# Patient Record
Sex: FEMALE | Race: WHITE | NOT HISPANIC OR LATINO | Employment: OTHER | URBAN - METROPOLITAN AREA
[De-identification: names, ages, dates, MRNs, and addresses within clinical notes are randomized per-mention and may not be internally consistent; named-entity substitution may affect disease eponyms.]

---

## 2023-04-11 ENCOUNTER — DOCUMENTATION (OUTPATIENT)
Dept: PRIMARY CARE | Facility: CLINIC | Age: 78
End: 2023-04-11
Payer: MEDICARE

## 2023-04-11 DIAGNOSIS — L29.9 ITCH OF SKIN: ICD-10-CM

## 2023-04-11 RX ORDER — TRIAMCINOLONE ACETONIDE 5 MG/G
CREAM TOPICAL 3 TIMES DAILY
Qty: 454 G | Refills: 0 | Status: SHIPPED | OUTPATIENT
Start: 2023-04-11 | End: 2023-10-10 | Stop reason: ALTCHOICE

## 2023-04-11 RX ORDER — TRIAMCINOLONE ACETONIDE 5 MG/G
CREAM TOPICAL
COMMUNITY
Start: 2023-01-10 | End: 2023-04-11 | Stop reason: SDUPTHER

## 2023-04-17 ENCOUNTER — APPOINTMENT (OUTPATIENT)
Dept: LAB | Facility: LAB | Age: 78
End: 2023-04-17
Payer: MEDICARE

## 2023-04-17 LAB
ALANINE AMINOTRANSFERASE (SGPT) (U/L) IN SER/PLAS: 18 U/L (ref 7–45)
ALBUMIN (G/DL) IN SER/PLAS: 4.2 G/DL (ref 3.4–5)
ALKALINE PHOSPHATASE (U/L) IN SER/PLAS: 48 U/L (ref 33–136)
ANION GAP IN SER/PLAS: 14 MMOL/L (ref 10–20)
ASPARTATE AMINOTRANSFERASE (SGOT) (U/L) IN SER/PLAS: 24 U/L (ref 9–39)
BILIRUBIN TOTAL (MG/DL) IN SER/PLAS: 0.5 MG/DL (ref 0–1.2)
CALCIUM (MG/DL) IN SER/PLAS: 9.3 MG/DL (ref 8.6–10.3)
CARBON DIOXIDE, TOTAL (MMOL/L) IN SER/PLAS: 26 MMOL/L (ref 21–32)
CHLORIDE (MMOL/L) IN SER/PLAS: 104 MMOL/L (ref 98–107)
CHOLESTEROL (MG/DL) IN SER/PLAS: 162 MG/DL (ref 0–199)
CHOLESTEROL IN HDL (MG/DL) IN SER/PLAS: 78.5 MG/DL
CHOLESTEROL/HDL RATIO: 2.1
CREATININE (MG/DL) IN SER/PLAS: 0.81 MG/DL (ref 0.5–1.05)
ERYTHROCYTE DISTRIBUTION WIDTH (RATIO) BY AUTOMATED COUNT: 14.1 % (ref 11.5–14.5)
ERYTHROCYTE MEAN CORPUSCULAR HEMOGLOBIN CONCENTRATION (G/DL) BY AUTOMATED: 32.2 G/DL (ref 32–36)
ERYTHROCYTE MEAN CORPUSCULAR VOLUME (FL) BY AUTOMATED COUNT: 95 FL (ref 80–100)
ERYTHROCYTES (10*6/UL) IN BLOOD BY AUTOMATED COUNT: 4.15 X10E12/L (ref 4–5.2)
GFR FEMALE: 74 ML/MIN/1.73M2
GLUCOSE (MG/DL) IN SER/PLAS: 88 MG/DL (ref 74–99)
HEMATOCRIT (%) IN BLOOD BY AUTOMATED COUNT: 39.4 % (ref 36–46)
HEMOGLOBIN (G/DL) IN BLOOD: 12.7 G/DL (ref 12–16)
LDL: 72 MG/DL (ref 0–99)
LEUKOCYTES (10*3/UL) IN BLOOD BY AUTOMATED COUNT: 5.6 X10E9/L (ref 4.4–11.3)
PLATELETS (10*3/UL) IN BLOOD AUTOMATED COUNT: 176 X10E9/L (ref 150–450)
POTASSIUM (MMOL/L) IN SER/PLAS: 4.2 MMOL/L (ref 3.5–5.3)
PROTEIN TOTAL: 6.6 G/DL (ref 6.4–8.2)
SODIUM (MMOL/L) IN SER/PLAS: 140 MMOL/L (ref 136–145)
THYROTROPIN (MIU/L) IN SER/PLAS BY DETECTION LIMIT <= 0.05 MIU/L: 0.42 MIU/L (ref 0.44–3.98)
TRIGLYCERIDE (MG/DL) IN SER/PLAS: 56 MG/DL (ref 0–149)
UREA NITROGEN (MG/DL) IN SER/PLAS: 27 MG/DL (ref 6–23)
VLDL: 11 MG/DL (ref 0–40)

## 2023-05-01 ENCOUNTER — OFFICE VISIT (OUTPATIENT)
Dept: PRIMARY CARE | Facility: CLINIC | Age: 78
End: 2023-05-01
Payer: MEDICARE

## 2023-05-01 VITALS
SYSTOLIC BLOOD PRESSURE: 130 MMHG | WEIGHT: 127 LBS | BODY MASS INDEX: 21.68 KG/M2 | DIASTOLIC BLOOD PRESSURE: 64 MMHG | HEIGHT: 64 IN

## 2023-05-01 DIAGNOSIS — E78.2 MIXED HYPERLIPIDEMIA: ICD-10-CM

## 2023-05-01 DIAGNOSIS — E89.0 HYPOTHYROIDISM, POSTABLATIVE: Primary | ICD-10-CM

## 2023-05-01 DIAGNOSIS — Z12.31 SCREENING MAMMOGRAM, ENCOUNTER FOR: ICD-10-CM

## 2023-05-01 DIAGNOSIS — I48.0 PAROXYSMAL ATRIAL FIBRILLATION (MULTI): ICD-10-CM

## 2023-05-01 PROBLEM — S69.92XA INJURY OF LEFT WRIST: Status: ACTIVE | Noted: 2023-05-01

## 2023-05-01 PROBLEM — Z97.3 WEARS GLASSES: Status: ACTIVE | Noted: 2023-05-01

## 2023-05-01 PROBLEM — D49.2 BONE TUMOR: Status: ACTIVE | Noted: 2023-05-01

## 2023-05-01 PROBLEM — R00.2 PALPITATIONS: Status: ACTIVE | Noted: 2023-05-01

## 2023-05-01 PROBLEM — K21.9 GERD WITHOUT ESOPHAGITIS: Status: ACTIVE | Noted: 2023-05-01

## 2023-05-01 PROBLEM — R19.4 CHANGE IN BOWEL HABIT: Status: ACTIVE | Noted: 2023-05-01

## 2023-05-01 PROBLEM — H18.529 CORNEAL EPITHELIAL AND BASEMENT MEMBRANE DYSTROPHY: Status: ACTIVE | Noted: 2023-05-01

## 2023-05-01 PROBLEM — M25.562 BILATERAL KNEE PAIN: Status: ACTIVE | Noted: 2023-05-01

## 2023-05-01 PROBLEM — R53.83 FATIGUE: Status: ACTIVE | Noted: 2023-05-01

## 2023-05-01 PROBLEM — M89.9 DISORDER OF BONE AND ARTICULAR CARTILAGE: Status: ACTIVE | Noted: 2023-05-01

## 2023-05-01 PROBLEM — G95.0 SYRINX OF SPINAL CORD (MULTI): Status: ACTIVE | Noted: 2023-05-01

## 2023-05-01 PROBLEM — R92.8 ABNORMAL MAMMOGRAM OF RIGHT BREAST: Status: ACTIVE | Noted: 2023-05-01

## 2023-05-01 PROBLEM — D12.6 TUBULAR ADENOMA OF COLON: Status: ACTIVE | Noted: 2023-05-01
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
## 2023-05-01 PROBLEM — H91.90 HEARING LOSS: Status: ACTIVE | Noted: 2023-05-01

## 2023-05-01 PROBLEM — M53.3 SACROILIAC JOINT PAIN: Status: ACTIVE | Noted: 2023-05-01

## 2023-05-01 PROBLEM — H53.8 BLURRED VISION, BILATERAL: Status: ACTIVE | Noted: 2023-05-01

## 2023-05-01 PROBLEM — S66.912A WRIST STRAIN, LEFT, INITIAL ENCOUNTER: Status: ACTIVE | Noted: 2023-05-01

## 2023-05-01 PROBLEM — M16.12 PRIMARY OSTEOARTHRITIS OF LEFT HIP: Status: ACTIVE | Noted: 2023-05-01

## 2023-05-01 PROBLEM — R79.89 ELEVATED SERUM CREATININE: Status: ACTIVE | Noted: 2023-05-01

## 2023-05-01 PROBLEM — I48.91 ATRIAL FIBRILLATION (MULTI): Status: ACTIVE | Noted: 2023-05-01

## 2023-05-01 PROBLEM — M25.561 BILATERAL KNEE PAIN: Status: ACTIVE | Noted: 2023-05-01

## 2023-05-01 PROBLEM — E55.9 VITAMIN D INSUFFICIENCY: Status: ACTIVE | Noted: 2023-05-01

## 2023-05-01 PROBLEM — E03.9 HYPOTHYROIDISM: Status: ACTIVE | Noted: 2023-05-01

## 2023-05-01 PROBLEM — R42 DIZZINESS: Status: ACTIVE | Noted: 2023-05-01

## 2023-05-01 PROBLEM — G93.0 BRAIN CYST: Status: ACTIVE | Noted: 2023-05-01

## 2023-05-01 PROBLEM — M94.9 DISORDER OF BONE AND ARTICULAR CARTILAGE: Status: ACTIVE | Noted: 2023-05-01

## 2023-05-01 PROBLEM — R06.02 SOB (SHORTNESS OF BREATH): Status: ACTIVE | Noted: 2023-05-01

## 2023-05-01 PROBLEM — M17.11 PRIMARY OSTEOARTHRITIS OF RIGHT KNEE: Status: ACTIVE | Noted: 2023-05-01

## 2023-05-01 PROBLEM — I47.10 PSVT (PAROXYSMAL SUPRAVENTRICULAR TACHYCARDIA) (CMS-HCC): Status: ACTIVE | Noted: 2023-05-01

## 2023-05-01 PROBLEM — M79.2 NEURALGIA: Status: ACTIVE | Noted: 2023-05-01

## 2023-05-01 PROBLEM — J47.9 BRONCHIECTASIS (MULTI): Status: ACTIVE | Noted: 2023-05-01

## 2023-05-01 PROBLEM — K62.5 RECTAL BLEEDING: Status: ACTIVE | Noted: 2023-05-01

## 2023-05-01 PROBLEM — N39.0 ACUTE LOWER UTI: Status: ACTIVE | Noted: 2023-05-01

## 2023-05-01 PROBLEM — R00.1 BRADYCARDIA: Status: ACTIVE | Noted: 2023-05-01

## 2023-05-01 PROBLEM — R10.9 ABDOMINAL PAIN: Status: ACTIVE | Noted: 2023-05-01

## 2023-05-01 PROBLEM — R53.81 MALAISE AND FATIGUE: Status: ACTIVE | Noted: 2023-05-01

## 2023-05-01 PROBLEM — M25.561 RIGHT KNEE PAIN: Status: ACTIVE | Noted: 2023-05-01

## 2023-05-01 PROBLEM — R51.9 RIGHT SIDED TEMPORAL HEADACHE: Status: ACTIVE | Noted: 2023-05-01

## 2023-05-01 PROBLEM — H04.123 DRY EYES: Status: ACTIVE | Noted: 2023-05-01

## 2023-05-01 PROBLEM — J06.9 VIRAL UPPER RESPIRATORY TRACT INFECTION WITH COUGH: Status: ACTIVE | Noted: 2023-05-01

## 2023-05-01 PROBLEM — M81.0 OSTEOPOROSIS: Status: ACTIVE | Noted: 2023-05-01

## 2023-05-01 PROBLEM — T14.8XXA SPLINTER: Status: ACTIVE | Noted: 2023-05-01

## 2023-05-01 PROBLEM — H25.13 NUCLEAR SCLEROSIS OF BOTH EYES: Status: ACTIVE | Noted: 2023-05-01

## 2023-05-01 PROBLEM — R10.32 ABDOMINAL DISCOMFORT, BILATERAL LOWER QUADRANT: Status: ACTIVE | Noted: 2023-05-01

## 2023-05-01 PROBLEM — R19.5 LOOSE STOOLS: Status: ACTIVE | Noted: 2023-05-01

## 2023-05-01 PROBLEM — M25.551 RIGHT HIP PAIN: Status: ACTIVE | Noted: 2023-05-01

## 2023-05-01 PROBLEM — T14.8XXA FOREIGN BODY IN SKIN: Status: ACTIVE | Noted: 2023-05-01

## 2023-05-01 PROBLEM — R79.82 ELEVATED HIGH SENSITIVITY C-REACTIVE PROTEIN: Status: ACTIVE | Noted: 2023-05-01

## 2023-05-01 PROBLEM — K55.9 ISCHEMIC COLITIS (MULTI): Status: ACTIVE | Noted: 2023-05-01

## 2023-05-01 PROBLEM — E05.00 GRAVES DISEASE: Status: ACTIVE | Noted: 2023-05-01

## 2023-05-01 PROBLEM — R03.0 BLOOD PRESSURE ELEVATED WITHOUT HISTORY OF HTN: Status: ACTIVE | Noted: 2023-05-01

## 2023-05-01 PROBLEM — M31.6 TEMPORAL ARTERITIS (MULTI): Status: ACTIVE | Noted: 2023-05-01

## 2023-05-01 PROBLEM — R10.819 LOWER ABDOMINAL TENDERNESS: Status: ACTIVE | Noted: 2023-05-01

## 2023-05-01 PROBLEM — E78.5 HYPERLIPIDEMIA: Status: ACTIVE | Noted: 2023-05-01

## 2023-05-01 PROBLEM — H43.393 VITREOUS FLOATERS OF BOTH EYES: Status: ACTIVE | Noted: 2023-05-01

## 2023-05-01 PROBLEM — R91.1 LUNG NODULE: Status: ACTIVE | Noted: 2023-05-01

## 2023-05-01 PROBLEM — E78.00 PURE HYPERCHOLESTEROLEMIA: Status: ACTIVE | Noted: 2023-05-01

## 2023-05-01 PROBLEM — D64.9 ANEMIA: Status: ACTIVE | Noted: 2023-05-01

## 2023-05-01 PROBLEM — H26.9 EARLY CATARACT: Status: ACTIVE | Noted: 2023-05-01

## 2023-05-01 PROBLEM — R50.9 FEVER: Status: ACTIVE | Noted: 2023-05-01

## 2023-05-01 PROBLEM — H52.03 HYPEROPIA OF BOTH EYES: Status: ACTIVE | Noted: 2023-05-01

## 2023-05-01 PROBLEM — R53.83 MALAISE AND FATIGUE: Status: ACTIVE | Noted: 2023-05-01

## 2023-05-01 PROBLEM — J01.00 ACUTE MAXILLARY SINUSITIS: Status: ACTIVE | Noted: 2023-05-01

## 2023-05-01 PROBLEM — R93.1 ELEVATED CORONARY ARTERY CALCIUM SCORE: Status: ACTIVE | Noted: 2023-05-01

## 2023-05-01 PROBLEM — R10.31 ABDOMINAL DISCOMFORT, BILATERAL LOWER QUADRANT: Status: ACTIVE | Noted: 2023-05-01

## 2023-05-01 PROBLEM — B35.4 RINGWORM OF BODY: Status: ACTIVE | Noted: 2023-05-01

## 2023-05-01 PROCEDURE — 1159F MED LIST DOCD IN RCRD: CPT | Performed by: INTERNAL MEDICINE

## 2023-05-01 PROCEDURE — 99213 OFFICE O/P EST LOW 20 MIN: CPT | Performed by: INTERNAL MEDICINE

## 2023-05-01 RX ORDER — UBIDECARENONE 75 MG
500 CAPSULE ORAL
COMMUNITY
Start: 2012-03-09

## 2023-05-01 RX ORDER — ROSUVASTATIN CALCIUM 20 MG/1
20 TABLET, COATED ORAL DAILY
COMMUNITY
Start: 2023-03-28 | End: 2024-02-01 | Stop reason: SDUPTHER

## 2023-05-01 RX ORDER — NAPROXEN 375 MG/1
1 TABLET ORAL 2 TIMES DAILY
COMMUNITY
Start: 2022-06-02

## 2023-05-01 RX ORDER — MULTIVITAMIN
TABLET ORAL
COMMUNITY
Start: 2007-05-16

## 2023-05-01 RX ORDER — METHYLPREDNISOLONE 4 MG
1000 TABLET, DOSE PACK ORAL
COMMUNITY
Start: 2012-03-09

## 2023-05-01 RX ORDER — CHOLECALCIFEROL (VITAMIN D3) 50 MCG
1 TABLET ORAL DAILY
COMMUNITY
Start: 2017-06-01

## 2023-05-01 RX ORDER — CALCIUM CARBONATE 600 MG
TABLET ORAL
COMMUNITY
Start: 2017-06-01 | End: 2023-10-24 | Stop reason: HOSPADM

## 2023-05-01 RX ORDER — FLECAINIDE ACETATE 100 MG/1
1 TABLET ORAL EVERY 12 HOURS
COMMUNITY
Start: 2015-10-21 | End: 2023-10-03

## 2023-05-01 RX ORDER — LEVOTHYROXINE SODIUM 100 UG/1
1 TABLET ORAL DAILY
COMMUNITY
Start: 2020-01-20 | End: 2023-05-01 | Stop reason: ALTCHOICE

## 2023-05-01 RX ORDER — FLAXSEED OIL 1000 MG
1000 CAPSULE ORAL 2 TIMES DAILY
COMMUNITY
Start: 2012-03-09

## 2023-05-01 RX ORDER — FOLIC ACID 1 MG/1
TABLET ORAL 2 TIMES DAILY
COMMUNITY

## 2023-05-01 RX ORDER — LEVOTHYROXINE SODIUM 88 UG/1
88 TABLET ORAL DAILY
Qty: 90 TABLET | Refills: 1 | Status: SHIPPED | OUTPATIENT
Start: 2023-05-01 | End: 2023-10-10 | Stop reason: SDUPTHER

## 2023-05-01 RX ORDER — PROPRANOLOL/HYDROCHLOROTHIAZID 40 MG-25MG
TABLET ORAL
COMMUNITY

## 2023-05-01 RX ORDER — OMEPRAZOLE 20 MG/1
20 CAPSULE, DELAYED RELEASE ORAL
COMMUNITY
Start: 2014-12-03 | End: 2023-05-01 | Stop reason: SDUPTHER

## 2023-05-01 RX ORDER — OMEPRAZOLE 20 MG/1
20 CAPSULE, DELAYED RELEASE ORAL
Qty: 180 CAPSULE | Refills: 1 | Status: SHIPPED | OUTPATIENT
Start: 2023-05-01 | End: 2023-10-10 | Stop reason: SDUPTHER

## 2023-05-01 RX ORDER — ASPIRIN 81 MG/1
1 TABLET ORAL DAILY
COMMUNITY
Start: 2016-12-16 | End: 2023-10-24 | Stop reason: HOSPADM

## 2023-05-01 NOTE — PROGRESS NOTES
Subjective   Patient ID: Laura Banuelos is a 77 y.o. female who presents for Follow-up (results).    HPI   Patient is here for follow-up on CT of the lungs  She has history of A. fib had ablation done she is off medications  She has history of heavy smoking in the past  She quit taking Sudafed and feeling better     Patient is here for follow-up on CT cardiac scoring  Also due for blood work     c/o feeling sob and dizziness for a while  Last month symptoms are getting worse  Denies any swelling in the legs  Denies any cough  Patient is taking to 2 Sudafed every day for last many months     patient here for f/u  headaches less   did blood work   due for colonoscopy soon for colon polyp   needs med refill  was eating lot of keys and egg  stopoped taking omeprazole   but needed to go on it again         past recap   Patient here for follow-up on abnormal blood work done by the neurologist  She is seeing neurologist for the headache but they are doing much better  She had MRI of the brain which showed cyst has a follow-up MRI scheduled for 6 months  She did colonoscopy shows tubular adenoma  She has follow-up appointment with Dr. Gr because of one large polyp which could not be removed and it was showing tubular adenoma  Follow-up on hypothyroidism high cholesterol and GERD  Doing very well on the current medications  Needs refills  Her blood work is due in January and she will be doing it then but does not want to come back     Patient is here for follow-up from urgent care  Couple of weeks ago patient started having some blurred vision Tuesday she started having headache about the right eye went to the urgent care and they got concerned that patient is having temporal arteritis. Patient was started on high-dose steroids. No blood work done for sed rate  No imaging study done  Patient says headache is less but still persisting describes it as dull headache. Denies any vision problems now had blurred vision couple  "of weeks ago        past recap  To establish PCP  Follow-up for high cholesterol hypothyroidism and A. fib s/p ablation  Due for blood work  Patient also has tubular adenoma and due for screening colonoscopy now  She got hearing aids and doing very well  Occasionally she gets lightheaded when she gets up too fast  Sometimes she does get numbness and tingling in the tips of the fingers and the feet     Past medical history: Graves' disease 40 years ago s/p radiation treatment now hypothyroidism, atrial fibrillation s/p ablation, ischemic colitis, appendectomy, osteoporosis, high cholesterol, tubular adenoma  Family history: Father coronary artery disease mother  of leukemia  Social history: Non-smoker, history of heavy smoking for long time drinks 1 wine every day  Health maintenance: Colonoscopy 2016      Review of Systems    Objective   /64   Ht 1.626 m (5' 4\")   Wt 57.6 kg (127 lb)   BMI 21.80 kg/m²     Physical Exam  Vitals reviewed.   Constitutional:       Appearance: Normal appearance.   HENT:      Head: Normocephalic and atraumatic.      Right Ear: Tympanic membrane, ear canal and external ear normal.      Left Ear: Tympanic membrane, ear canal and external ear normal.      Nose: Nose normal.      Mouth/Throat:      Pharynx: Oropharynx is clear.   Eyes:      Extraocular Movements: Extraocular movements intact.      Conjunctiva/sclera: Conjunctivae normal.      Pupils: Pupils are equal, round, and reactive to light.   Cardiovascular:      Rate and Rhythm: Normal rate and regular rhythm.      Pulses: Normal pulses.      Heart sounds: Normal heart sounds.   Pulmonary:      Effort: Pulmonary effort is normal.      Breath sounds: Normal breath sounds.   Abdominal:      General: Abdomen is flat. Bowel sounds are normal.      Palpations: Abdomen is soft.   Musculoskeletal:      Cervical back: Normal range of motion and neck supple.   Skin:     General: Skin is warm and dry.   Neurological:      General: " No focal deficit present.      Mental Status: She is alert and oriented to person, place, and time.   Psychiatric:         Mood and Affect: Mood normal.         Assessment/Plan   Problem List Items Addressed This Visit          Circulatory    Atrial fibrillation (CMS/HCC)    Relevant Medications    omeprazole (PriLOSEC) 20 mg DR capsule    Other Relevant Orders    CBC    Comprehensive Metabolic Panel    Lipid Panel       Endocrine/Metabolic    Hypothyroidism, postablative - Primary    Relevant Medications    levothyroxine (Synthroid, Levoxyl) 88 mcg tablet    Other Relevant Orders    TSH with reflex to Free T4 if abnormal       Other    Hyperlipidemia    Relevant Medications    omeprazole (PriLOSEC) 20 mg DR capsule    Other Relevant Orders    CBC    Comprehensive Metabolic Panel    Lipid Panel     Other Visit Diagnoses       Screening mammogram, encounter for        Relevant Orders    BI mammo bilateral screening tomosynthesis             5/31  Injections normal sinus rhythm  Chest x-ray done shows no acute finding  Patient examination is normal  She had MRI done recently which was showing no acute findings  Her blood work recently in March was all normal no signs of anemia  We will do CT cardiac scoring to assess cardiac risk. Try Antivert to see if it helps  Also not sure if symptoms could be related to Sudafed  Advised patient to cut downtaking Sudafed   Follow-up after the test     8/1  CT cardiac scoring reviewed  Patient has elevated CT cardiac score but not critical  Discussed lifestyle modification  Lung nodule we will do CT chest without contrast  Patient has history of smoking which is probably the cause for her bronchiectasis  Discussed pulmonary hygiene  Blood work ordered     11/28/22  CT of the chest results reviewed  Patient has long history of smoking  She was 2 packs/day for many years  Which shows severe emphysema and bronchiectasis  Clinically patient has no signs of TB no cough no fever no  history of TB  Encourage patient to do pulmonary exercises  We will check 2D echo for atherosclerosis  Refer to cardiology for cardiac evaluation  Medications refilled  Follow-up in 3 months    4/1/2023  CBC CMP liver enzymes all normal  TSH little suppressed  Will reduce dose of Synthroid 88 mcg  Cholesterol very well controlled on Crestor  Continue omeprazole  Follow-up blood work in 6 months  Mammogram ordered  Patient is going to see the orthopedic for the knee

## 2023-06-27 ENCOUNTER — HOSPITAL ENCOUNTER (OUTPATIENT)
Dept: DATA CONVERSION | Facility: HOSPITAL | Age: 78
End: 2023-06-27
Attending: RADIOLOGY | Admitting: RADIOLOGY
Payer: MEDICARE

## 2023-06-27 DIAGNOSIS — M25.552 PAIN IN LEFT HIP: ICD-10-CM

## 2023-06-27 DIAGNOSIS — M16.12 UNILATERAL PRIMARY OSTEOARTHRITIS, LEFT HIP: ICD-10-CM

## 2023-06-27 LAB
CLARITY FLUID: CLEAR
COLOR OF BODY FLUID: NORMAL
ERYTHROCYTES (/UL) IN BODY FLUID: 1000 /UL
LEUKOCYTES (/UL) IN BODY FLUID: 154 /UL

## 2023-06-28 LAB — JOINT FLUID CRYSTALS: NORMAL

## 2023-07-01 LAB
GRAM STAIN: NORMAL
STERILE FLUID CULTURE/SMEAR: NORMAL

## 2023-08-07 ENCOUNTER — HOSPITAL ENCOUNTER (OUTPATIENT)
Dept: DATA CONVERSION | Facility: HOSPITAL | Age: 78
End: 2023-08-07
Attending: INTERNAL MEDICINE
Payer: MEDICARE

## 2023-08-07 DIAGNOSIS — D12.0 BENIGN NEOPLASM OF CECUM: ICD-10-CM

## 2023-08-07 DIAGNOSIS — D12.2 BENIGN NEOPLASM OF ASCENDING COLON: ICD-10-CM

## 2023-08-07 DIAGNOSIS — Z86.010 PERSONAL HISTORY OF COLONIC POLYPS: ICD-10-CM

## 2023-08-07 DIAGNOSIS — D12.6 BENIGN NEOPLASM OF COLON, UNSPECIFIED: ICD-10-CM

## 2023-08-07 DIAGNOSIS — Z12.11 ENCOUNTER FOR SCREENING FOR MALIGNANT NEOPLASM OF COLON: ICD-10-CM

## 2023-08-07 DIAGNOSIS — Z09 ENCOUNTER FOR FOLLOW-UP EXAMINATION AFTER COMPLETED TREATMENT FOR CONDITIONS OTHER THAN MALIGNANT NEOPLASM: ICD-10-CM

## 2023-08-07 DIAGNOSIS — D12.3 BENIGN NEOPLASM OF TRANSVERSE COLON: ICD-10-CM

## 2023-08-11 LAB
COMPLETE PATHOLOGY REPORT: NORMAL
CONVERTED CLINICAL DIAGNOSIS-HISTORY: NORMAL
CONVERTED FINAL DIAGNOSIS: NORMAL
CONVERTED FINAL REPORT PDF LINK TO COPY AND PASTE: NORMAL
CONVERTED GROSS DESCRIPTION: NORMAL

## 2023-09-11 PROBLEM — I25.10 CORONARY ATHEROSCLEROSIS: Status: ACTIVE | Noted: 2023-09-11

## 2023-09-11 PROBLEM — D12.6 COLON ADENOMAS: Status: ACTIVE | Noted: 2023-09-11

## 2023-09-11 PROBLEM — J44.9 CHRONIC OBSTRUCTIVE PULMONARY DISEASE (MULTI): Status: ACTIVE | Noted: 2023-09-11

## 2023-09-11 PROBLEM — J43.9 EMPHYSEMA LUNG (MULTI): Status: ACTIVE | Noted: 2023-09-11

## 2023-09-11 PROBLEM — L89.90 PRESSURE SORE: Status: ACTIVE | Noted: 2023-09-11

## 2023-09-11 PROBLEM — M85.80 OSTEOPENIA: Status: ACTIVE | Noted: 2023-09-11

## 2023-09-11 PROBLEM — K37 APPENDICITIS: Status: ACTIVE | Noted: 2023-09-11

## 2023-09-11 PROBLEM — H43.399 VITREOUS FLOATERS: Status: ACTIVE | Noted: 2023-09-11

## 2023-09-11 RX ORDER — POLYETHYLENE GLYCOL 3350, SODIUM SULFATE ANHYDROUS, SODIUM BICARBONATE, SODIUM CHLORIDE, POTASSIUM CHLORIDE 236; 22.74; 6.74; 5.86; 2.97 G/4L; G/4L; G/4L; G/4L; G/4L
POWDER, FOR SOLUTION ORAL
COMMUNITY
Start: 2023-04-25 | End: 2023-10-03 | Stop reason: ALTCHOICE

## 2023-09-11 RX ORDER — OMEPRAZOLE 20 MG/1
1 CAPSULE, DELAYED RELEASE ORAL 2 TIMES DAILY
COMMUNITY
Start: 2022-11-28 | End: 2023-10-03 | Stop reason: ALTCHOICE

## 2023-09-11 RX ORDER — PHENYLPROPANOLAMINE/CLEMASTINE 75-1.34MG
TABLET, EXTENDED RELEASE ORAL
COMMUNITY
Start: 2017-12-07 | End: 2023-10-03 | Stop reason: ALTCHOICE

## 2023-09-11 RX ORDER — OXYCODONE AND ACETAMINOPHEN 5; 325 MG/1; MG/1
TABLET ORAL
COMMUNITY
End: 2023-10-03 | Stop reason: ALTCHOICE

## 2023-09-11 RX ORDER — WARFARIN 4 MG/1
4 TABLET ORAL EVERY 24 HOURS
COMMUNITY
End: 2023-10-03 | Stop reason: ALTCHOICE

## 2023-09-11 RX ORDER — LEVOTHYROXINE SODIUM 88 UG/1
1 CAPSULE ORAL DAILY
COMMUNITY
Start: 2020-01-20 | End: 2023-10-03 | Stop reason: ALTCHOICE

## 2023-09-11 RX ORDER — SIMVASTATIN 10 MG/1
10 TABLET, FILM COATED ORAL NIGHTLY
COMMUNITY
Start: 2012-03-09 | End: 2023-10-03 | Stop reason: ALTCHOICE

## 2023-10-03 ENCOUNTER — CLINICAL SUPPORT (OUTPATIENT)
Dept: PREADMISSION TESTING | Facility: HOSPITAL | Age: 78
End: 2023-10-03
Payer: MEDICARE

## 2023-10-03 VITALS
OXYGEN SATURATION: 100 % | HEART RATE: 72 BPM | SYSTOLIC BLOOD PRESSURE: 174 MMHG | BODY MASS INDEX: 21.83 KG/M2 | TEMPERATURE: 96.1 F | DIASTOLIC BLOOD PRESSURE: 82 MMHG | RESPIRATION RATE: 18 BRPM | WEIGHT: 127.87 LBS | HEIGHT: 64 IN

## 2023-10-03 DIAGNOSIS — M16.12 PRIMARY OSTEOARTHRITIS OF LEFT HIP: ICD-10-CM

## 2023-10-03 DIAGNOSIS — Z01.818 PREOP EXAMINATION: Primary | ICD-10-CM

## 2023-10-03 PROBLEM — E03.2 HYPOTHYROIDISM DUE TO MEDICAMENTS AND OTHER EXOGENOUS SUBSTANCES: Status: ACTIVE | Noted: 2023-05-01

## 2023-10-03 LAB
ALBUMIN SERPL BCP-MCNC: 4.2 G/DL (ref 3.4–5)
ALP SERPL-CCNC: 60 U/L (ref 33–136)
ALT SERPL W P-5'-P-CCNC: 26 U/L (ref 7–45)
ANION GAP SERPL CALC-SCNC: 13 MMOL/L (ref 10–20)
APPEARANCE UR: CLEAR
AST SERPL W P-5'-P-CCNC: 32 U/L (ref 9–39)
BILIRUB SERPL-MCNC: 0.5 MG/DL (ref 0–1.2)
BILIRUB UR STRIP.AUTO-MCNC: NEGATIVE MG/DL
BUN SERPL-MCNC: 18 MG/DL (ref 6–23)
CALCIUM SERPL-MCNC: 8.9 MG/DL (ref 8.6–10.3)
CHLORIDE SERPL-SCNC: 103 MMOL/L (ref 98–107)
CO2 SERPL-SCNC: 25 MMOL/L (ref 21–32)
COLOR UR: ABNORMAL
CREAT SERPL-MCNC: 0.75 MG/DL (ref 0.5–1.05)
ERYTHROCYTE [DISTWIDTH] IN BLOOD BY AUTOMATED COUNT: 13.5 % (ref 11.5–14.5)
GFR SERPL CREATININE-BSD FRML MDRD: 82 ML/MIN/1.73M*2
GLUCOSE SERPL-MCNC: 98 MG/DL (ref 74–99)
GLUCOSE UR STRIP.AUTO-MCNC: NEGATIVE MG/DL
HCT VFR BLD AUTO: 39.3 % (ref 36–46)
HGB BLD-MCNC: 12.8 G/DL (ref 12–16)
KETONES UR STRIP.AUTO-MCNC: NEGATIVE MG/DL
LEUKOCYTE ESTERASE UR QL STRIP.AUTO: NEGATIVE
MCH RBC QN AUTO: 30.6 PG (ref 26–34)
MCHC RBC AUTO-ENTMCNC: 32.6 G/DL (ref 32–36)
MCV RBC AUTO: 94 FL (ref 80–100)
NITRITE UR QL STRIP.AUTO: NEGATIVE
NRBC BLD-RTO: 0 /100 WBCS (ref 0–0)
PH UR STRIP.AUTO: 6 [PH]
PLATELET # BLD AUTO: 162 X10*3/UL (ref 150–450)
PMV BLD AUTO: 9.9 FL (ref 7.5–11.5)
POTASSIUM SERPL-SCNC: 4.3 MMOL/L (ref 3.5–5.3)
PROT SERPL-MCNC: 6.8 G/DL (ref 6.4–8.2)
PROT UR STRIP.AUTO-MCNC: NEGATIVE MG/DL
RBC # BLD AUTO: 4.18 X10*6/UL (ref 4–5.2)
RBC # UR STRIP.AUTO: NEGATIVE /UL
SODIUM SERPL-SCNC: 137 MMOL/L (ref 136–145)
SP GR UR STRIP.AUTO: 1
UROBILINOGEN UR STRIP.AUTO-MCNC: <2 MG/DL
WBC # BLD AUTO: 7.9 X10*3/UL (ref 4.4–11.3)

## 2023-10-03 PROCEDURE — 80053 COMPREHEN METABOLIC PANEL: CPT

## 2023-10-03 PROCEDURE — 36415 COLL VENOUS BLD VENIPUNCTURE: CPT

## 2023-10-03 PROCEDURE — 87081 CULTURE SCREEN ONLY: CPT | Mod: CMCLAB,GEALAB

## 2023-10-03 PROCEDURE — 85027 COMPLETE CBC AUTOMATED: CPT

## 2023-10-03 PROCEDURE — 99202 OFFICE O/P NEW SF 15 MIN: CPT | Performed by: PHYSICIAN ASSISTANT

## 2023-10-03 PROCEDURE — 81003 URINALYSIS AUTO W/O SCOPE: CPT

## 2023-10-03 RX ORDER — CHLORHEXIDINE GLUCONATE ORAL RINSE 1.2 MG/ML
15 SOLUTION DENTAL DAILY
Qty: 30 ML | Refills: 0 | Status: SHIPPED | OUTPATIENT
Start: 2023-10-03 | End: 2023-10-10 | Stop reason: ALTCHOICE

## 2023-10-03 RX ORDER — POVIDONE-IODINE 10 %
1 SOLUTION, NON-ORAL TOPICAL ONCE
Status: DISCONTINUED | OUTPATIENT
Start: 2023-10-03 | End: 2023-10-24

## 2023-10-03 ASSESSMENT — PAIN SCALES - GENERAL
PAINLEVEL_OUTOF10: 5 - MODERATE PAIN
PAINLEVEL_OUTOF10: 5 - MODERATE PAIN

## 2023-10-03 ASSESSMENT — CHADS2 SCORE
CHADS2 SCORE: 1
PRIOR STROKE OR TIA OR THROMBOEMBOLISM: NO
CHF: NO
AGE GREATER THAN OR EQUAL TO 75: YES
HYPERTENSION: NO
DIABETES: NO

## 2023-10-03 ASSESSMENT — DUKE ACTIVITY SCORE INDEX (DASI)
CAN YOU DO YARD WORK LIKE RAKING LEAVES, WEEDING OR PUSHING A MOWER: YES
CAN YOU PARTICIPATE IN MODERATE RECREATIONAL ACTIVITIES LIKE GOLF, BOWLING, DANCING, DOUBLES TENNIS OR THROWING A BASEBALL OR FOOTBALL: NO
DASI METS SCORE: 5.6
TOTAL_SCORE: 23.45
CAN YOU HAVE SEXUAL RELATIONS: NO
CAN YOU CLIMB A FLIGHT OF STAIRS OR WALK UP A HILL: YES
CAN YOU WALK INDOORS, SUCH AS AROUND YOUR HOUSE: YES
CAN YOU DO LIGHT WORK AROUND THE HOUSE LIKE DUSTING OR WASHING DISHES: YES
CAN YOU DO MODERATE WORK AROUND THE HOUSE LIKE VACUUMING, SWEEPING FLOORS OR CARRYING GROCERIES: YES
CAN YOU TAKE CARE OF YOURSELF (EAT, DRESS, BATHE, OR USE TOILET): YES
CAN YOU PARTICIPATE IN STRENOUS SPORTS LIKE SWIMMING, SINGLES TENNIS, FOOTBALL, BASKETBALL, OR SKIING: NO
CAN YOU DO HEAVY WORK AROUND THE HOUSE LIKE SCRUBBING FLOORS OR LIFTING AND MOVING HEAVY FURNITURE: NO
CAN YOU WALK A BLOCK OR TWO ON LEVEL GROUND: YES
CAN YOU RUN A SHORT DISTANCE: NO

## 2023-10-03 ASSESSMENT — ACTIVITIES OF DAILY LIVING (ADL): ADL_SCORE: 0

## 2023-10-03 ASSESSMENT — PAIN - FUNCTIONAL ASSESSMENT
PAIN_FUNCTIONAL_ASSESSMENT: 0-10
PAIN_FUNCTIONAL_ASSESSMENT: 0-10

## 2023-10-03 ASSESSMENT — LIFESTYLE VARIABLES: SMOKING_STATUS: NONSMOKER

## 2023-10-03 NOTE — CPM/PAT H&P
CPM/PAT Evaluation       Name: Laura Banuelos (Laura Banuelos)  /Age: 1945/78 y.o.     {[facility] Visit Type:57183}      Chief Complaint: ***    HPI    Past Medical History:   Diagnosis Date   • Hypothyroidism due to medicaments and other exogenous substances 2015    Iatrogenic hypothyroidism   • Long term (current) use of anticoagulants 2016    Anticoagulated on warfarin   • Nonscarring hair loss, unspecified 2015    Hair loss   • Other specified postprocedural states 2016    Status post circumferential ablation of pulmonary vein   • Other specified postprocedural states 2016    S/P ablation of atrial fibrillation   • Personal history of other diseases of the circulatory system 2015    History of atrial fibrillation   • Personal history of other endocrine, nutritional and metabolic disease 2015    History of Graves' disease   • Personal history of other infectious and parasitic diseases 2018    History of herpes zoster   • Personal history of other specified conditions 2017    History of headache   • Vascular disorder of intestine, unspecified (CMS/[facility]) 2017    Ischemic colitis       Past Surgical History:   Procedure Laterality Date   • APPENDECTOMY  2015    Appendectomy   • COLONOSCOPY  2015    Complete Colonoscopy       Patient  has no history on file for sexual activity.    Family History   Problem Relation Name Age of Onset   • Leukemia Mother     • Other (cardiac disorder) Father         No Known Allergies    Prior to Admission medications    Medication Sig Start Date End Date Taking? Authorizing Provider   aspirin 81 mg EC tablet Take 1 tablet (81 mg) by mouth once daily. 16   Historical Provider, MD   calcium carbonate 600 mg calcium (1,500 mg) tablet Take by mouth. 17   Historical Provider, MD   chlorhexidine (Peridex) 0.12 % solution Use 15 mL in the mouth or throat once daily for 2 doses. Swish and spit one capful night  before surgery and day of surgery 10/3/23 10/5/23  [facility]   cholecalciferol (Vitamin D-3) 50 MCG (2000 UT) tablet Take 1 tablet (50 mcg) by mouth once daily. 6/1/17   Historical Provider, MD   cyanocobalamin (Vitamin B-12) 500 mcg tablet Take 1 tablet (500 mcg) by mouth once daily. 3/9/12   Historical Provider, MD   flaxseed oiL 1,000 mg capsule Take 1 capsule (1,000 mg) by mouth twice a day. 3/9/12   Historical Provider, MD   flecainide (Tambocor) 100 mg tablet Take 1 tablet (100 mg) by mouth every 12 hours. 10/21/15   Historical Provider, MD   folic acid (Folvite) 1 mg tablet Take by mouth twice a day.    Historical Provider, MD   glucosamine sulfate 500 mg tablet Take 1,000 mg by mouth once daily. 3/9/12   Historical Provider, MD   ibuprofen (Advil Migraine) capsule TAKE 1 CAPSULE EVERY 4 TO 6 HOURS. 12/7/17   Historical Provider, MD   levothyroxine (Synthroid, Levoxyl) 100 mcg tablet Take 1 tablet (100 mcg) by mouth once daily. 1/20/20   Historical Provider, MD   levothyroxine (Synthroid, Levoxyl) 88 mcg tablet Take 1 tablet (88 mcg) by mouth once daily. 5/1/23 4/30/24  Tamar De La Cruz MD   multivitamin tablet Take by mouth. 5/16/07   Historical Provider, MD   naproxen (Naprosyn) 375 mg tablet Take 1 tablet (375 mg) by mouth 2 times a day. 6/2/22   Historical Provider, MD   omeprazole (PriLOSEC) 20 mg DR capsule Take 1 capsule (20 mg) by mouth 2 times a day before meals. 5/1/23   Tamar De La Cruz MD   omeprazole (PriLOSEC) 40 mg DR capsule Take 1 capsule (40 mg) by mouth once daily. 11/28/22   Historical Provider, MD   oxyCODONE-acetaminophen (Percocet) 5-325 mg tablet 1-2 tablets. oral every four to six hours PRN pain    Historical Provider, MD   polyethylene glycol-electrolytes (polyethylene glycol) 420 gram solution Take by mouth. TAKE AS DIRECTED. 4/25/23   Historical Provider, MD   rosuvastatin (Crestor) 20 mg tablet Take 1 tablet (20 mg) by mouth once daily. 3/28/23   Historical Provider, MD    simvastatin (Zocor) 10 mg tablet Take 1 tablet (10 mg) by mouth once daily at bedtime. 3/9/12   Historical Provider, MD   triamcinolone (Kenalog) 0.5 % cream Apply topically 3 times a day. Apply to affected areas 2-3 times daily 4/11/23   Tamar De La Cruz MD   turmeric-turmeric root extract 450-50 mg capsule Take by mouth.    Historical Provider, MD   vitamins A,C,E-zinc-copper 2,148 mcg-113 mg-45 mg-17.4mg tablet Take 1 tablet by mouth once daily with a meal. 12/3/14   Historical Provider, MD   warfarin (Coumadin) 4 mg tablet Take 1 tablet (4 mg) by mouth once every 24 hours.    Historical Provider, MD        [unfilled]    PAT Physical Exam     Airway    There were no vitals taken for this visit.    DASI Risk Score    No data to display       Caprini DVT Assessment      Flowsheet Row Most Recent Value   DVT Score 3   Age Over 75 years   BMI 30 or less          Modified Frailty Index    No data to display       CHADS2 Stroke Risk  Current as of about an hour ago        2.8% 3 - 100%: High Risk   2 - 3%: Medium Risk   0 - 2%: Low Risk     No Change          This score determines the patient's risk of having a stroke if the patient has atrial fibrillation.          Points Metrics   0 Has Congestive Heart Failure:  No     Patients with congestive heart failure get 1 point.    Current as of about an hour ago   0 Has Hypertension:  No     Patients with hypertension get 1 point.    Current as of about an hour ago   1 Age:  78     Patients who are 75 years of age or older get 1 point.    Current as of about an hour ago   0 Has Diabetes:  No     Patients with diabetes get 1 point.    Current as of about an hour ago   0 Had Stroke:  No  Had TIA:  No  Had Thromboembolism:  No     Patients who have had a stroke, TIA, or thromboembolism get 2 points.    Current as of about an hour ago             Revised Cardiac Risk Index    No data to display       Apfel Simplified Score    No data to display       Risk Analysis Index Results  This Encounter    No data found in the last 1 encounters.         Assessment and Plan:     {[facility] EMBEDDED ASSESSMENT AND PLAN:94347}

## 2023-10-03 NOTE — CPM/PAT H&P
CPM/PAT Evaluation       Name: Laura Banuelos (Laura Banuelos)  /Age: 1945/78 y.o.     Visit Type:       Chief Complaint: preop      HPI    Past Medical History:   Diagnosis Date    Hypothyroidism due to medicaments and other exogenous substances 2015    Iatrogenic hypothyroidism    Long term (current) use of anticoagulants 2016    Anticoagulated on warfarin    Nonscarring hair loss, unspecified 2015    Hair loss    Other specified postprocedural states 2016    Status post circumferential ablation of pulmonary vein    Other specified postprocedural states 2016    S/P ablation of atrial fibrillation    Personal history of other diseases of the circulatory system 2015    History of atrial fibrillation    Personal history of other endocrine, nutritional and metabolic disease 2015    History of Graves' disease    Personal history of other infectious and parasitic diseases 2018    History of herpes zoster    Personal history of other specified conditions 2017    History of headache    Vascular disorder of intestine, unspecified (CMS/HCC) 2017    Ischemic colitis       Past Surgical History:   Procedure Laterality Date    ABLATION OF DYSRHYTHMIC FOCUS      APPENDECTOMY  2015    Appendectomy    COLONOSCOPY  2023    Complete Colonoscopy       Patient Sexual activity questions deferred to the physician.    Family History   Problem Relation Name Age of Onset    Leukemia Mother      Other (cardiac disorder) Father         No Known Allergies    Prior to Admission medications    Medication Sig Start Date End Date Taking? Authorizing Provider   aspirin 81 mg EC tablet Take 1 tablet (81 mg) by mouth once daily. 16  Yes Historical Provider, MD   calcium carbonate 600 mg calcium (1,500 mg) tablet Take by mouth. 17  Yes Historical Provider, MD   cholecalciferol (Vitamin D-3) 50 MCG (2000 UT) tablet Take 1 tablet (50 mcg) by mouth once daily.  6/1/17  Yes Historical Provider, MD   cyanocobalamin (Vitamin B-12) 500 mcg tablet Take 1 tablet (500 mcg) by mouth once daily. 3/9/12  Yes Historical Provider, MD   flaxseed oiL 1,000 mg capsule Take 1 capsule (1,000 mg) by mouth twice a day. 3/9/12  Yes Historical Provider, MD   folic acid (Folvite) 1 mg tablet Take by mouth twice a day.   Yes Historical Provider, MD   glucosamine sulfate 500 mg tablet Take 1,000 mg by mouth once daily. 3/9/12  Yes Historical Provider, MD   levothyroxine (Synthroid, Levoxyl) 88 mcg tablet Take 1 tablet (88 mcg) by mouth once daily. 5/1/23 4/30/24 Yes Tamar De La Cruz MD   multivitamin tablet Take by mouth. 5/16/07  Yes Historical Provider, MD   naproxen (Naprosyn) 375 mg tablet Take 1 tablet (375 mg) by mouth 2 times a day. 6/2/22  Yes Historical Provider, MD   omeprazole (PriLOSEC) 20 mg DR capsule Take 1 capsule (20 mg) by mouth 2 times a day before meals. 5/1/23  Yes Tamar De La Cruz MD   rosuvastatin (Crestor) 20 mg tablet Take 1 tablet (20 mg) by mouth once daily. 3/28/23  Yes Historical Provider, MD   turmeric-turmeric root extract 450-50 mg capsule Take by mouth.   Yes Historical Provider, MD   vitamins A,C,E-zinc-copper 2,148 mcg-113 mg-45 mg-17.4mg tablet Take 1 tablet by mouth once daily with a meal. 12/3/14  Yes Historical Provider, MD   levothyroxine (Tirosint) 88 mcg capsule Take 1 tablet by mouth once daily. 1/20/20 10/3/23 Yes Historical Provider, MD   omeprazole (PriLOSEC) 20 mg DR capsule Take 1 capsule (20 mg) by mouth 2 times a day. 11/28/22 10/3/23 Yes Historical Provider, MD   oxyCODONE-acetaminophen (Percocet) 5-325 mg tablet 1-2 tablets. oral every four to six hours PRN pain  10/3/23 Yes Historical Provider, MD   chlorhexidine (Peridex) 0.12 % solution Use 15 mL in the mouth or throat once daily for 2 doses. Swish and spit one capful night before surgery and day of surgery  Patient not taking: Reported on 10/3/2023 10/3/23 10/5/23  Sasha Delarosa PA-C    triamcinolone (Kenalog) 0.5 % cream Apply topically 3 times a day. Apply to affected areas 2-3 times daily  Patient not taking: Reported on 10/3/2023 4/11/23   Tamar De La Cruz MD   flecainide (Tambocor) 100 mg tablet Take 1 tablet (100 mg) by mouth every 12 hours. 10/21/15 10/3/23  Historical Provider, MD   ibuprofen (Advil Migraine) capsule TAKE 1 CAPSULE EVERY 4 TO 6 HOURS. 12/7/17 10/3/23  Historical Provider, MD   polyethylene glycol-electrolytes (polyethylene glycol) 420 gram solution Take by mouth. TAKE AS DIRECTED. 4/25/23 10/3/23  Historical Provider, MD   simvastatin (Zocor) 10 mg tablet Take 1 tablet (10 mg) by mouth once daily at bedtime. 3/9/12 10/3/23  Historical Provider, MD   warfarin (Coumadin) 4 mg tablet Take 1 tablet (4 mg) by mouth once every 24 hours.  10/3/23  Historical Provider, MD        [unfilled]    [facility] Physical Exam     Airway    Visit Vitals  /82   Pulse 72   Temp 35.6 °C (96.1 °F) (Oral)   Resp 18       DASI Risk Score      Flowsheet Row Most Recent Value   DASI SCORE 23.45   METS Score (Will be calculated only when all the questions are answered) 5.6          Caprini DVT Assessment      Flowsheet Row Most Recent Value   DVT Score 3   Age Over 75 years   BMI 30 or less          Modified Frailty Index      Flowsheet Row Most Recent Value   Modified Frailty Index Calculator 0          CHADS2 Stroke Risk  Current as of 12 minutes ago        2.8% 3 - 100%: High Risk   2 - 3%: Medium Risk   0 - 2%: Low Risk     No Change          This score determines the patient's risk of having a stroke if the patient has atrial fibrillation.          Points Metrics   0 Has Congestive Heart Failure:  No     Patients with congestive heart failure get 1 point.    Current as of 12 minutes ago   0 Has Hypertension:  No     Patients with hypertension get 1 point.    Current as of 12 minutes ago   1 Age:  78     Patients who are 75 years of age or older get 1 point.    Current as of 12 minutes ago   0 Has  Diabetes:  No     Patients with diabetes get 1 point.    Current as of 12 minutes ago   0 Had Stroke:  No  Had TIA:  No  Had Thromboembolism:  No     Patients who have had a stroke, TIA, or thromboembolism get 2 points.    Current as of 12 minutes ago             Revised Cardiac Risk Index      Flowsheet Row Most Recent Value   Revised Cardiac Risk Calculator 0          Apfel Simplified Score      Flowsheet Row Most Recent Value   Apfel Simplified Score Calculator 2          Risk Analysis Index Results This Encounter         10/3/2023  1324             NUNEZ Cancer History: Patient does not indicate history of cancer    Total Risk Analysis Index Score Without Cancer: 24    Total Risk Analysis Index Score: 24          Stop Bang Score      Flowsheet Row Most Recent Value   Do you snore loudly? 1   Do you often feel tired or fatigued after your sleep? 0   Has anyone ever observed you stop breathing in your sleep? 0   Do you have or are you being treated for high blood pressure? 0   Recent BMI (Calculated) 21.9   Is BMI greater than 35 kg/m2? 0=No   Age older than 50 years old? 1=Yes   Is your neck circumference greater than 17 inches (Male) or 16 inches (Female)? 0            Assessment and Plan:     Other:

## 2023-10-03 NOTE — PREPROCEDURE INSTRUCTIONS
Medication List            Accurate as of October 3, 2023  1:33 PM. Always use your most recent med list.                aspirin 81 mg EC tablet  Medication Adjustments for Surgery: Stop 7 days before surgery     calcium carbonate 600 mg calcium (1,500 mg) tablet  Medication Adjustments for Surgery: Stop 7 days before surgery     chlorhexidine 0.12 % solution  Commonly known as: Peridex  Use 15 mL in the mouth or throat once daily for 2 doses. Swish and spit one capful night before surgery and day of surgery     cholecalciferol 50 MCG (2000 UT) tablet  Commonly known as: Vitamin D-3  Medication Adjustments for Surgery: Stop 7 days before surgery     cyanocobalamin 500 mcg tablet  Commonly known as: Vitamin B-12  Medication Adjustments for Surgery: Stop 7 days before surgery     flaxseed oiL 1,000 mg capsule  Medication Adjustments for Surgery: Stop 7 days before surgery     folic acid 1 mg tablet  Commonly known as: Folvite  Medication Adjustments for Surgery: Stop 7 days before surgery     glucosamine sulfate 500 mg tablet  Medication Adjustments for Surgery: Stop 7 days before surgery     levothyroxine 88 mcg tablet  Commonly known as: Synthroid, Levoxyl  Take 1 tablet (88 mcg) by mouth once daily.     multivitamin tablet  Medication Adjustments for Surgery: Stop 7 days before surgery     naproxen 375 mg tablet  Commonly known as: Naprosyn  Medication Adjustments for Surgery: Stop 7 days before surgery     omeprazole 20 mg DR capsule  Commonly known as: PriLOSEC  Take 1 capsule (20 mg) by mouth 2 times a day before meals.     rosuvastatin 20 mg tablet  Commonly known as: Crestor  Medication Adjustments for Surgery: Take morning of surgery with sip of water, no other fluids     triamcinolone 0.5 % cream  Commonly known as: Kenalog  Apply topically 3 times a day. Apply to affected areas 2-3 times daily  Medication Adjustments for Surgery: Take morning of surgery with sip of water, no other fluids      turmeric-turmeric root extract 450-50 mg capsule  Medication Adjustments for Surgery: Stop 7 days before surgery     vitamins A,C,E-zinc-copper 2,148 mcg-113 mg-45 mg-17.4mg tablet  Medication Adjustments for Surgery: Stop 7 days before surgery                              NPO Instructions:    Do not eat any food after midnight the night before your surgery/procedure.  You may have clear liquids until TWO hours before surgery/procedure. This includes water, black tea/coffee, (no milk or cream) apple juice and electrolyte drinks (Gatorade).  You may chew gum up to TWO hours before your surgery/procedure.    Additional Instructions:     Seven/Six Days before Surgery:  Review your medication instructions, stop indicated medications  Begin using your Hibiclens  Three Days before Surgery:  Review your medication instructions, stop indicated medications  The Day before Surgery:  Review your medication instructions, stop indicated medications  You will be contacted regarding the time of your arrival to facility and surgery time  Do not eat any food after Midnight  Do not use moisturizers, creams, lotions or perfume

## 2023-10-03 NOTE — CPM/PAT H&P
CPM/PAT Evaluation       Name: Laura Banuelos (Laura Banuelos)  /Age: 1945/ y.o.     In-Person       Chief Complaint: L hip pain    HPI Patient presents with L hip pain. Pain ongoing for year but worse recenlty. No assistive devices. Failed conservative treatmets.   Past Medical History:   Diagnosis Date    Hypothyroidism due to medicaments and other exogenous substances 2015    Iatrogenic hypothyroidism    Long term (current) use of anticoagulants 2016    Anticoagulated on warfarin    Nonscarring hair loss, unspecified 2015    Hair loss    Other specified postprocedural states 2016    Status post circumferential ablation of pulmonary vein    Other specified postprocedural states 2016    S/P ablation of atrial fibrillation    Personal history of other diseases of the circulatory system 2015    History of atrial fibrillation    Personal history of other endocrine, nutritional and metabolic disease 2015    History of Graves' disease    Personal history of other infectious and parasitic diseases 2018    History of herpes zoster    Personal history of other specified conditions 2017    History of headache    Vascular disorder of intestine, unspecified (CMS/HCC) 2017    Ischemic colitis     Appendectomy  Colonoscopy   Cardiac ablation     Family History   Problem Relation Name Age of Onset    Leukemia Mother      Other (cardiac disorder) Father         No Known Allergies    Prior to Admission medications    Medication Sig Start Date End Date Taking? Authorizing Provider   aspirin 81 mg EC tablet Take 1 tablet (81 mg) by mouth once daily. 16   Historical Provider, MD   calcium carbonate 600 mg calcium (1,500 mg) tablet Take by mouth. 17   Historical Provider, MD   chlorhexidine (Peridex) 0.12 % solution Use 15 mL in the mouth or throat once daily for 2 doses. Swish and spit one capful night before surgery and day of surgery 10/3/23 10/5/23   [facility]   cholecalciferol (Vitamin D-3) 50 MCG (2000 UT) tablet Take 1 tablet (50 mcg) by mouth once daily. 6/1/17   Historical Provider, MD   cyanocobalamin (Vitamin B-12) 500 mcg tablet Take 1 tablet (500 mcg) by mouth once daily. 3/9/12   Historical Provider, MD   flaxseed oiL 1,000 mg capsule Take 1 capsule (1,000 mg) by mouth twice a day. 3/9/12   Historical Provider, MD   flecainide (Tambocor) 100 mg tablet Take 1 tablet (100 mg) by mouth every 12 hours. 10/21/15   Historical Provider, MD   folic acid (Folvite) 1 mg tablet Take by mouth twice a day.    Historical Provider, MD   glucosamine sulfate 500 mg tablet Take 1,000 mg by mouth once daily. 3/9/12   Historical Provider, MD   ibuprofen (Advil Migraine) capsule TAKE 1 CAPSULE EVERY 4 TO 6 HOURS. 12/7/17   Historical Provider, MD   levothyroxine (Synthroid, Levoxyl) 100 mcg tablet Take 1 tablet (100 mcg) by mouth once daily. 1/20/20   Historical Provider, MD   levothyroxine (Synthroid, Levoxyl) 88 mcg tablet Take 1 tablet (88 mcg) by mouth once daily. 5/1/23 4/30/24  Tamar De La Cruz MD   multivitamin tablet Take by mouth. 5/16/07   Historical Provider, MD   naproxen (Naprosyn) 375 mg tablet Take 1 tablet (375 mg) by mouth 2 times a day. 6/2/22   Historical Provider, MD   omeprazole (PriLOSEC) 20 mg DR capsule Take 1 capsule (20 mg) by mouth 2 times a day before meals. 5/1/23   Tamar De La Cruz MD   omeprazole (PriLOSEC) 40 mg DR capsule Take 1 capsule (40 mg) by mouth once daily. 11/28/22   Historical Provider, MD   oxyCODONE-acetaminophen (Percocet) 5-325 mg tablet 1-2 tablets. oral every four to six hours PRN pain    Historical Provider, MD   polyethylene glycol-electrolytes (polyethylene glycol) 420 gram solution Take by mouth. TAKE AS DIRECTED. 4/25/23   Historical Provider, MD   rosuvastatin (Crestor) 20 mg tablet Take 1 tablet (20 mg) by mouth once daily. 3/28/23   Historical Provider, MD   simvastatin (Zocor) 10 mg tablet Take 1 tablet  (10 mg) by mouth once daily at bedtime. 3/9/12   Historical Provider, MD   triamcinolone (Kenalog) 0.5 % cream Apply topically 3 times a day. Apply to affected areas 2-3 times daily 4/11/23   Tamar De La Cruz MD   turmeric-turmeric root extract 450-50 mg capsule Take by mouth.    Historical Provider, MD   vitamins A,C,E-zinc-copper 2,148 mcg-113 mg-45 mg-17.4mg tablet Take 1 tablet by mouth once daily with a meal. 12/3/14   Historical Provider, MD   warfarin (Coumadin) 4 mg tablet Take 1 tablet (4 mg) by mouth once every 24 hours.    Historical Provider, MD        [unfilled]    Physical Exam  Vitals and nursing note reviewed. Physical exam within normal limits.   Musculoskeletal:      Right hip: Decreased range of motion.      Comments: L hip: pain with ROM. Limited Rom. No swelling.   Good AROM of bilateral ankles.           DASI Risk Score    No data to display       Caprini DVT Assessment      Flowsheet Row Most Recent Value   DVT Score 3   Age Over 75 years   BMI 30 or less          Modified Frailty Index    No data to display       CHADS2 Stroke Risk  Current as of about an hour ago        2.8% 3 - 100%: High Risk   2 - 3%: Medium Risk   0 - 2%: Low Risk     No Change          This score determines the patient's risk of having a stroke if the patient has atrial fibrillation.          Points Metrics   0 Has Congestive Heart Failure:  No     Patients with congestive heart failure get 1 point.    Current as of about an hour ago   0 Has Hypertension:  No     Patients with hypertension get 1 point.    Current as of about an hour ago   1 Age:  78     Patients who are 75 years of age or older get 1 point.    Current as of about an hour ago   0 Has Diabetes:  No     Patients with diabetes get 1 point.    Current as of about an hour ago   0 Had Stroke:  No  Had TIA:  No  Had Thromboembolism:  No     Patients who have had a stroke, TIA, or thromboembolism get 2 points.    Current as of about an hour ago             Revised  Cardiac Risk Index    No data to display       Apfel Simplified Score    No data to display       Risk Analysis Index Results This Encounter    No data found in the last 1 encounters.         Assessment and Plan:     Musculoskeletal:   Plan is for L OLIVA by Dr. Mosqueda 10/23/23.   EKG 10/3/23 NSR  Stress test IMPRESSION: 2/16/23  1. Normal stress myocardial perfusion imaging in response to pharmacologic stress without evidence of ischemia or infarct.  2. Well-maintained left ventricular function with an ejection  fraction of greater than 65%.  3. Normal left ventricular size.    Patient is to monitor BP at home and call PCP if BP remains elevated.

## 2023-10-03 NOTE — PREPROCEDURE INSTRUCTIONS
Medication List            Accurate as of October 3, 2023  1:40 PM. Always use your most recent med list.                aspirin 81 mg EC tablet  Medication Adjustments for Surgery: Stop 7 days before surgery     calcium carbonate 600 mg calcium (1,500 mg) tablet  Medication Adjustments for Surgery: Stop 7 days before surgery     chlorhexidine 0.12 % solution  Commonly known as: Peridex  Use 15 mL in the mouth or throat once daily for 2 doses. Swish and spit one capful night before surgery and day of surgery     cholecalciferol 50 MCG (2000 UT) tablet  Commonly known as: Vitamin D-3  Medication Adjustments for Surgery: Stop 7 days before surgery     cyanocobalamin 500 mcg tablet  Commonly known as: Vitamin B-12  Medication Adjustments for Surgery: Stop 7 days before surgery     flaxseed oiL 1,000 mg capsule  Medication Adjustments for Surgery: Stop 7 days before surgery     folic acid 1 mg tablet  Commonly known as: Folvite  Medication Adjustments for Surgery: Stop 7 days before surgery     glucosamine sulfate 500 mg tablet  Medication Adjustments for Surgery: Stop 7 days before surgery     levothyroxine 88 mcg tablet  Commonly known as: Synthroid, Levoxyl  Take 1 tablet (88 mcg) by mouth once daily.     multivitamin tablet  Medication Adjustments for Surgery: Stop 7 days before surgery     naproxen 375 mg tablet  Commonly known as: Naprosyn  Medication Adjustments for Surgery: Stop 7 days before surgery     omeprazole 20 mg DR capsule  Commonly known as: PriLOSEC  Take 1 capsule (20 mg) by mouth 2 times a day before meals.     rosuvastatin 20 mg tablet  Commonly known as: Crestor  Medication Adjustments for Surgery: Take morning of surgery with sip of water, no other fluids     triamcinolone 0.5 % cream  Commonly known as: Kenalog  Apply topically 3 times a day. Apply to affected areas 2-3 times daily  Medication Adjustments for Surgery: Take morning of surgery with sip of water, no other fluids      turmeric-turmeric root extract 450-50 mg capsule  Medication Adjustments for Surgery: Stop 7 days before surgery     vitamins A,C,E-zinc-copper 2,148 mcg-113 mg-45 mg-17.4mg tablet  Medication Adjustments for Surgery: Stop 7 days before surgery                              NPO Instructions:    You may have clear liquids until TWO hours before surgery/procedure. This includes water, black tea/coffee, (no milk or cream) apple juice and electrolyte drinks (Gatorade).  You may chew gum up to TWO hours before your surgery/procedure.    Additional Instructions:

## 2023-10-05 LAB — STAPHYLOCOCCUS SPEC CULT: NORMAL

## 2023-10-05 RX ORDER — GUAIFENESIN 600 MG/1
1200 TABLET, EXTENDED RELEASE ORAL DAILY
COMMUNITY
End: 2023-10-24 | Stop reason: HOSPADM

## 2023-10-05 RX ORDER — POLYETHYLENE GLYCOL 3350 17 G/17G
17 POWDER, FOR SOLUTION ORAL DAILY
COMMUNITY

## 2023-10-10 DIAGNOSIS — I48.0 PAROXYSMAL ATRIAL FIBRILLATION (MULTI): ICD-10-CM

## 2023-10-10 DIAGNOSIS — E89.0 HYPOTHYROIDISM, POSTABLATIVE: ICD-10-CM

## 2023-10-10 DIAGNOSIS — E78.2 MIXED HYPERLIPIDEMIA: ICD-10-CM

## 2023-10-10 RX ORDER — LEVOTHYROXINE SODIUM 88 UG/1
88 TABLET ORAL DAILY
Qty: 90 TABLET | Refills: 0 | Status: SHIPPED | OUTPATIENT
Start: 2023-10-10 | End: 2023-12-19 | Stop reason: SDUPTHER

## 2023-10-10 RX ORDER — OMEPRAZOLE 20 MG/1
20 CAPSULE, DELAYED RELEASE ORAL
Qty: 180 CAPSULE | Refills: 0 | Status: SHIPPED | OUTPATIENT
Start: 2023-10-10 | End: 2024-01-31 | Stop reason: SDUPTHER

## 2023-10-13 ENCOUNTER — HOSPITAL ENCOUNTER (OUTPATIENT)
Dept: RADIOLOGY | Facility: HOSPITAL | Age: 78
Discharge: HOME | End: 2023-10-13
Payer: MEDICARE

## 2023-10-13 ENCOUNTER — OFFICE VISIT (OUTPATIENT)
Dept: ORTHOPEDIC SURGERY | Facility: CLINIC | Age: 78
End: 2023-10-13
Payer: MEDICARE

## 2023-10-13 ENCOUNTER — LAB (OUTPATIENT)
Dept: LAB | Facility: LAB | Age: 78
End: 2023-10-13
Payer: MEDICARE

## 2023-10-13 DIAGNOSIS — E89.0 HYPOTHYROIDISM, POSTABLATIVE: ICD-10-CM

## 2023-10-13 DIAGNOSIS — M25.552 LEFT HIP PAIN: ICD-10-CM

## 2023-10-13 DIAGNOSIS — E78.2 MIXED HYPERLIPIDEMIA: ICD-10-CM

## 2023-10-13 DIAGNOSIS — I48.0 PAROXYSMAL ATRIAL FIBRILLATION (MULTI): ICD-10-CM

## 2023-10-13 DIAGNOSIS — M25.552 LEFT HIP PAIN: Primary | ICD-10-CM

## 2023-10-13 LAB
ALBUMIN SERPL BCP-MCNC: 4.7 G/DL (ref 3.4–5)
ALP SERPL-CCNC: 63 U/L (ref 33–136)
ALT SERPL W P-5'-P-CCNC: 26 U/L (ref 7–45)
ANION GAP SERPL CALC-SCNC: 20 MMOL/L (ref 10–20)
AST SERPL W P-5'-P-CCNC: 33 U/L (ref 9–39)
BILIRUB SERPL-MCNC: 0.5 MG/DL (ref 0–1.2)
BUN SERPL-MCNC: 23 MG/DL (ref 6–23)
CALCIUM SERPL-MCNC: 9.6 MG/DL (ref 8.6–10.3)
CHLORIDE SERPL-SCNC: 100 MMOL/L (ref 98–107)
CHOLEST SERPL-MCNC: 178 MG/DL (ref 0–199)
CHOLESTEROL/HDL RATIO: 2.2
CO2 SERPL-SCNC: 27 MMOL/L (ref 21–32)
CREAT SERPL-MCNC: 0.75 MG/DL (ref 0.5–1.05)
ERYTHROCYTE [DISTWIDTH] IN BLOOD BY AUTOMATED COUNT: 14.1 % (ref 11.5–14.5)
GFR SERPL CREATININE-BSD FRML MDRD: 82 ML/MIN/1.73M*2
GLUCOSE SERPL-MCNC: 82 MG/DL (ref 74–99)
HCT VFR BLD AUTO: 40.9 % (ref 36–46)
HDLC SERPL-MCNC: 82.6 MG/DL
HGB BLD-MCNC: 13.1 G/DL (ref 12–16)
LDLC SERPL CALC-MCNC: 80 MG/DL
MCH RBC QN AUTO: 30.5 PG (ref 26–34)
MCHC RBC AUTO-ENTMCNC: 32 G/DL (ref 32–36)
MCV RBC AUTO: 95 FL (ref 80–100)
NON HDL CHOLESTEROL: 95 MG/DL (ref 0–149)
NRBC BLD-RTO: 0 /100 WBCS (ref 0–0)
PLATELET # BLD AUTO: 266 X10*3/UL (ref 150–450)
PMV BLD AUTO: 10.2 FL (ref 7.5–11.5)
POTASSIUM SERPL-SCNC: 4.5 MMOL/L (ref 3.5–5.3)
PROT SERPL-MCNC: 7.4 G/DL (ref 6.4–8.2)
RBC # BLD AUTO: 4.3 X10*6/UL (ref 4–5.2)
SODIUM SERPL-SCNC: 142 MMOL/L (ref 136–145)
TRIGL SERPL-MCNC: 76 MG/DL (ref 0–149)
TSH SERPL-ACNC: 3.3 MIU/L (ref 0.44–3.98)
VLDL: 15 MG/DL (ref 0–40)
WBC # BLD AUTO: 6.9 X10*3/UL (ref 4.4–11.3)

## 2023-10-13 PROCEDURE — 72100 X-RAY EXAM L-S SPINE 2/3 VWS: CPT | Mod: FY

## 2023-10-13 PROCEDURE — 36415 COLL VENOUS BLD VENIPUNCTURE: CPT

## 2023-10-13 PROCEDURE — 1126F AMNT PAIN NOTED NONE PRSNT: CPT | Performed by: ORTHOPAEDIC SURGERY

## 2023-10-13 PROCEDURE — 1159F MED LIST DOCD IN RCRD: CPT | Performed by: ORTHOPAEDIC SURGERY

## 2023-10-13 PROCEDURE — 99213 OFFICE O/P EST LOW 20 MIN: CPT | Performed by: ORTHOPAEDIC SURGERY

## 2023-10-13 PROCEDURE — 84443 ASSAY THYROID STIM HORMONE: CPT

## 2023-10-13 PROCEDURE — 1157F ADVNC CARE PLAN IN RCRD: CPT | Performed by: ORTHOPAEDIC SURGERY

## 2023-10-13 PROCEDURE — 1036F TOBACCO NON-USER: CPT | Performed by: ORTHOPAEDIC SURGERY

## 2023-10-13 PROCEDURE — 80061 LIPID PANEL: CPT

## 2023-10-13 PROCEDURE — 85027 COMPLETE CBC AUTOMATED: CPT

## 2023-10-13 PROCEDURE — 72100 X-RAY EXAM L-S SPINE 2/3 VWS: CPT | Performed by: RADIOLOGY

## 2023-10-13 PROCEDURE — 80053 COMPREHEN METABOLIC PANEL: CPT

## 2023-10-13 NOTE — PROGRESS NOTES
This is a 78 y.o. year old female who presents for preoperative visit for her left total hip.  Patient has severe degenerative disease of the affected joint. The patient complains of severe pain in the area, the pain is gradually getting worse. She has failed extensive nonsurgical treatment including anti-inflammatories physical therapy use of assistive devices activity modification cortisone injections. Despite this interventions the patient is worsening pain which impacts her quality of life and activities of daily living and they would like to proceed with joint replacement.    Physical Exam    There has been no interval change in this patient's past medical, surgical, medications, allergies, family history or social history since the most recent visit to a provider within our department. 14 point review of systems was performed, reviewed, and negative except for pertinent positives documented in the history of present illness.     Constitutional: well developed, well nourished female in no acute distress  Psychiatric: normal mood, appropriate affect  Eyes: sclera anicteric  HENT: normocephalic/atraumatic  CV: regular rate and rhythm   Respiratory: non labored breathing  Integumentary: no rash  Neurological: moves all extremities    Pre-Admission Testing on 10/03/2023   Component Date Value Ref Range Status    Staph/MRSA Screen Culture 10/03/2023 No Staphylococcus aureus isolated   Final    Glucose 10/03/2023 98  74 - 99 mg/dL Final    Sodium 10/03/2023 137  136 - 145 mmol/L Final    Potassium 10/03/2023 4.3  3.5 - 5.3 mmol/L Final    Chloride 10/03/2023 103  98 - 107 mmol/L Final    Bicarbonate 10/03/2023 25  21 - 32 mmol/L Final    Anion Gap 10/03/2023 13  10 - 20 mmol/L Final    Urea Nitrogen 10/03/2023 18  6 - 23 mg/dL Final    Creatinine 10/03/2023 0.75  0.50 - 1.05 mg/dL Final    eGFR 10/03/2023 82  >60 mL/min/1.73m*2 Final    Calculations of estimated GFR are performed using the 2021 CKD-EPI Study Refit   equation without the race variable for the IDMS-Traceable Creatinine Methods.    https://jasn.asnjournals.org/content/early/2021/09/22/ASN.0136237597    Calcium 10/03/2023 8.9  8.6 - 10.3 mg/dL Final    Albumin 10/03/2023 4.2  3.4 - 5.0 g/dL Final    Alkaline Phosphatase 10/03/2023 60  33 - 136 U/L Final    Total Protein 10/03/2023 6.8  6.4 - 8.2 g/dL Final    AST 10/03/2023 32  9 - 39 U/L Final    Bilirubin, Total 10/03/2023 0.5  0.0 - 1.2 mg/dL Final    ALT 10/03/2023 26  7 - 45 U/L Final    Patients treated with Sulfasalazine may generate falsely decreased results for ALT.    WBC 10/03/2023 7.9  4.4 - 11.3 x10*3/uL Final    nRBC 10/03/2023 0.0  0.0 - 0.0 /100 WBCs Final    RBC 10/03/2023 4.18  4.00 - 5.20 x10*6/uL Final    Hemoglobin 10/03/2023 12.8  12.0 - 16.0 g/dL Final    Hematocrit 10/03/2023 39.3  36.0 - 46.0 % Final    MCV 10/03/2023 94  80 - 100 fL Final    MCH 10/03/2023 30.6  26.0 - 34.0 pg Final    MCHC 10/03/2023 32.6  32.0 - 36.0 g/dL Final    RDW 10/03/2023 13.5  11.5 - 14.5 % Final    Platelets 10/03/2023 162  150 - 450 x10*3/uL Final    MPV 10/03/2023 9.9  7.5 - 11.5 fL Final    Color, Urine 10/03/2023 Straw  Straw, Yellow Final    Appearance, Urine 10/03/2023 Clear  Clear Final    Specific Gravity, Urine 10/03/2023 1.004 (N)  1.005 - 1.035 Final    pH, Urine 10/03/2023 6.0  5.0, 5.5, 6.0, 6.5, 7.0, 7.5, 8.0 Final    Protein, Urine 10/03/2023 NEGATIVE  NEGATIVE mg/dL Final    Glucose, Urine 10/03/2023 NEGATIVE  NEGATIVE mg/dL Final    Blood, Urine 10/03/2023 NEGATIVE  NEGATIVE Final    Ketones, Urine 10/03/2023 NEGATIVE  NEGATIVE mg/dL Final    Bilirubin, Urine 10/03/2023 NEGATIVE  NEGATIVE Final    Urobilinogen, Urine 10/03/2023 <2.0  <2.0 mg/dL Final    Nitrite, Urine 10/03/2023 NEGATIVE  NEGATIVE Final    Leukocyte Esterase, Urine 10/03/2023 NEGATIVE  NEGATIVE Final         Left hip exam: skin normal, no abrasions, wounds, or lacerations. nttp over greater trochanter. negative log roll,  "negative iban's test. flexion to 90 degrees without pain. no pain with flexion abduction and external rotation, reproduction of severe hip and groin pain with flexion adduction and internal rotation. neurovascularly intact distally        Preoperative labs reviewed, no findings which would preclude surgery    Impression plan: This is a 78 y.o. yo femalewith severe end-stage degenerative disease of the left hip that has failed nonoperative management.  Once again I discussed with the patient in detail the risks benefits and alternatives of total joint replacement. For the full details of that discussion see my previous note. The patient has obtained appropriate medical and dental clearance, and her labs have been reviewed. We will plan to proceed with surgery.    BMI Readings from Last 1 Encounters:   10/03/23 21.94 kg/m²     Lab Results   Component Value Date    CREATININE 0.75 10/03/2023     Tobacco Use: Medium Risk (10/3/2023)    Patient History     Smoking Tobacco Use: Former     Smokeless Tobacco Use: Never     Passive Exposure: Not on file      Computed MELD 3.0 unavailable. Necessary lab results were not found in the last year.  Computed MELD-Na unavailable. Necessary lab results were not found in the last year.       No results found for: \"HGBA1C\"  Lab Results   Component Value Date    STAPHMRSASCR No Staphylococcus aureus isolated 10/03/2023     "

## 2023-10-17 ENCOUNTER — OFFICE VISIT (OUTPATIENT)
Dept: PRIMARY CARE | Facility: CLINIC | Age: 78
End: 2023-10-17
Payer: MEDICARE

## 2023-10-17 VITALS
SYSTOLIC BLOOD PRESSURE: 144 MMHG | BODY MASS INDEX: 21.85 KG/M2 | DIASTOLIC BLOOD PRESSURE: 70 MMHG | HEIGHT: 64 IN | WEIGHT: 128 LBS

## 2023-10-17 DIAGNOSIS — E55.9 VITAMIN D INSUFFICIENCY: ICD-10-CM

## 2023-10-17 DIAGNOSIS — K21.9 GERD WITHOUT ESOPHAGITIS: ICD-10-CM

## 2023-10-17 DIAGNOSIS — E89.0 HYPOTHYROIDISM, POSTABLATIVE: ICD-10-CM

## 2023-10-17 DIAGNOSIS — E78.00 PURE HYPERCHOLESTEROLEMIA: Primary | ICD-10-CM

## 2023-10-17 PROBLEM — D64.9 ANEMIA: Status: RESOLVED | Noted: 2023-05-01 | Resolved: 2023-10-17

## 2023-10-17 PROBLEM — R53.83 FATIGUE: Status: RESOLVED | Noted: 2023-05-01 | Resolved: 2023-10-17

## 2023-10-17 PROBLEM — R10.9 ABDOMINAL PAIN: Status: RESOLVED | Noted: 2023-05-01 | Resolved: 2023-10-17

## 2023-10-17 PROBLEM — N39.0 ACUTE LOWER UTI: Status: RESOLVED | Noted: 2023-05-01 | Resolved: 2023-10-17

## 2023-10-17 PROBLEM — R50.9 FEVER: Status: RESOLVED | Noted: 2023-05-01 | Resolved: 2023-10-17

## 2023-10-17 PROBLEM — J01.00 ACUTE MAXILLARY SINUSITIS: Status: RESOLVED | Noted: 2023-05-01 | Resolved: 2023-10-17

## 2023-10-17 PROBLEM — R19.4 CHANGE IN BOWEL HABIT: Status: RESOLVED | Noted: 2023-05-01 | Resolved: 2023-10-17

## 2023-10-17 PROCEDURE — 1126F AMNT PAIN NOTED NONE PRSNT: CPT | Performed by: INTERNAL MEDICINE

## 2023-10-17 PROCEDURE — 1159F MED LIST DOCD IN RCRD: CPT | Performed by: INTERNAL MEDICINE

## 2023-10-17 PROCEDURE — 99213 OFFICE O/P EST LOW 20 MIN: CPT | Performed by: INTERNAL MEDICINE

## 2023-10-17 PROCEDURE — 1036F TOBACCO NON-USER: CPT | Performed by: INTERNAL MEDICINE

## 2023-10-17 NOTE — PROGRESS NOTES
Subjective   Patient ID: Laura Banuelos is a 78 y.o. female who presents for Follow-up.    HPI   Patient is here for follow-up  Monday she is going for left hip surgery by Dr. Mosqueda  Got flu shot COVID shot  Needs refill on omeprazole  Levothyroxine     patient is here for follow-up on CT of the lungs  She has history of A. fib had ablation done she is off medications  She has history of heavy smoking in the past  She quit taking Sudafed and feeling better     Patient is here for follow-up on CT cardiac scoring  Also due for blood work     c/o feeling sob and dizziness for a while  Last month symptoms are getting worse  Denies any swelling in the legs  Denies any cough  Patient is taking to 2 Sudafed every day for last many months     patient here for f/u  headaches less   did blood work   due for colonoscopy soon for colon polyp   needs med refill  was eating lot of keys and egg  stopoped taking omeprazole   but needed to go on it again         past recap   Patient here for follow-up on abnormal blood work done by the neurologist  She is seeing neurologist for the headache but they are doing much better  She had MRI of the brain which showed cyst has a follow-up MRI scheduled for 6 months  She did colonoscopy shows tubular adenoma  She has follow-up appointment with Dr. Gr because of one large polyp which could not be removed and it was showing tubular adenoma  Follow-up on hypothyroidism high cholesterol and GERD  Doing very well on the current medications  Needs refills  Her blood work is due in January and she will be doing it then but does not want to come back     Patient is here for follow-up from urgent care  Couple of weeks ago patient started having some blurred vision Tuesday she started having headache about the right eye went to the urgent care and they got concerned that patient is having temporal arteritis. Patient was started on high-dose steroids. No blood work done for sed rate  No imaging  "study done  Patient says headache is less but still persisting describes it as dull headache. Denies any vision problems now had blurred vision couple of weeks ago        past recap  To establish PCP  Follow-up for high cholesterol hypothyroidism and A. fib s/p ablation  Due for blood work  Patient also has tubular adenoma and due for screening colonoscopy now  She got hearing aids and doing very well  Occasionally she gets lightheaded when she gets up too fast  Sometimes she does get numbness and tingling in the tips of the fingers and the feet     Past medical history: Graves' disease 40 years ago s/p radiation treatment now hypothyroidism, atrial fibrillation s/p ablation, ischemic colitis, appendectomy, osteoporosis, high cholesterol, tubular adenoma  Family history: Father coronary artery disease mother  of leukemia  Social history: Non-smoker, history of heavy smoking for long time drinks 1 wine every day  Health maintenance: Colonoscopy 2016      Review of Systems    Objective   /70   Ht 1.626 m (5' 4\")   Wt 58.1 kg (128 lb)   BMI 21.97 kg/m²     Physical Exam  Vitals reviewed.   Constitutional:       Appearance: Normal appearance.   HENT:      Head: Normocephalic and atraumatic.      Right Ear: Tympanic membrane, ear canal and external ear normal.      Left Ear: Tympanic membrane, ear canal and external ear normal.      Nose: Nose normal.      Mouth/Throat:      Pharynx: Oropharynx is clear.   Eyes:      Extraocular Movements: Extraocular movements intact.      Conjunctiva/sclera: Conjunctivae normal.      Pupils: Pupils are equal, round, and reactive to light.   Cardiovascular:      Rate and Rhythm: Normal rate and regular rhythm.      Pulses: Normal pulses.      Heart sounds: Normal heart sounds.   Pulmonary:      Effort: Pulmonary effort is normal.      Breath sounds: Normal breath sounds.   Abdominal:      General: Abdomen is flat. Bowel sounds are normal.      Palpations: Abdomen is soft. "   Musculoskeletal:      Cervical back: Normal range of motion and neck supple.   Skin:     General: Skin is warm and dry.   Neurological:      General: No focal deficit present.      Mental Status: She is alert and oriented to person, place, and time.   Psychiatric:         Mood and Affect: Mood normal.         Assessment/Plan   Problem List Items Addressed This Visit          Cardiac and Vasculature    Pure hypercholesterolemia - Primary    Relevant Orders    CBC    Comprehensive Metabolic Panel    Lipid Panel    Vitamin D 25-Hydroxy,Total (for eval of Vitamin D levels)       Endocrine/Metabolic    Hypothyroidism, postablative    Relevant Orders    TSH with reflex to Free T4 if abnormal    Vitamin D insufficiency    Relevant Orders    Vitamin D 25-Hydroxy,Total (for eval of Vitamin D levels)       Gastrointestinal and Abdominal    GERD without esophagitis      5/31  Injections normal sinus rhythm  Chest x-ray done shows no acute finding  Patient examination is normal  She had MRI done recently which was showing no acute findings  Her blood work recently in March was all normal no signs of anemia  We will do CT cardiac scoring to assess cardiac risk. Try Antivert to see if it helps  Also not sure if symptoms could be related to Sudafed  Advised patient to cut downtaking Sudafed   Follow-up after the test     8/1  CT cardiac scoring reviewed  Patient has elevated CT cardiac score but not critical  Discussed lifestyle modification  Lung nodule we will do CT chest without contrast  Patient has history of smoking which is probably the cause for her bronchiectasis  Discussed pulmonary hygiene  Blood work ordered     11/28/22  CT of the chest results reviewed  Patient has long history of smoking  She was 2 packs/day for many years  Which shows severe emphysema and bronchiectasis  Clinically patient has no signs of TB no cough no fever no history of TB  Encourage patient to do pulmonary exercises  We will check 2D echo for  atherosclerosis  Refer to cardiology for cardiac evaluation  Medications refilled  Follow-up in 3 months    4/1/2023  CBC CMP liver enzymes all normal  TSH little suppressed  Will reduce dose of Synthroid 88 mcg  Cholesterol very well controlled on Crestor  Continue omeprazole  Follow-up blood work in 6 months  Mammogram ordered  Patient is going to see the orthopedic for the knee    10/17/2023  Blood pressure is stable  Cholesterol well controlled thyroid in range  Continue current medications  Follow-up in 6 months   No deformities present

## 2023-10-20 ENCOUNTER — ANESTHESIA EVENT (OUTPATIENT)
Dept: OPERATING ROOM | Facility: HOSPITAL | Age: 78
End: 2023-10-20
Payer: MEDICARE

## 2023-10-20 RX ORDER — CEFAZOLIN SODIUM 2 G/100ML
2 INJECTION, SOLUTION INTRAVENOUS ONCE
Status: CANCELLED | OUTPATIENT
Start: 2023-10-23 | End: 2023-10-23

## 2023-10-20 RX ORDER — CELECOXIB 400 MG/1
400 CAPSULE ORAL ONCE
Status: CANCELLED | OUTPATIENT
Start: 2023-10-23 | End: 2023-10-23

## 2023-10-20 RX ORDER — ACETAMINOPHEN 325 MG/1
975 TABLET ORAL ONCE
Status: CANCELLED | OUTPATIENT
Start: 2023-10-23 | End: 2023-10-23

## 2023-10-21 ENCOUNTER — HOME HEALTH ADMISSION (OUTPATIENT)
Dept: HOME HEALTH SERVICES | Facility: HOME HEALTH | Age: 78
End: 2023-10-21
Payer: MEDICARE

## 2023-10-23 ENCOUNTER — ANESTHESIA (OUTPATIENT)
Dept: OPERATING ROOM | Facility: HOSPITAL | Age: 78
End: 2023-10-23
Payer: MEDICARE

## 2023-10-23 ENCOUNTER — HOSPITAL ENCOUNTER (OUTPATIENT)
Facility: HOSPITAL | Age: 78
Discharge: HOME | End: 2023-10-24
Attending: ORTHOPAEDIC SURGERY | Admitting: ORTHOPAEDIC SURGERY
Payer: MEDICARE

## 2023-10-23 ENCOUNTER — APPOINTMENT (OUTPATIENT)
Dept: RADIOLOGY | Facility: HOSPITAL | Age: 78
End: 2023-10-23
Payer: MEDICARE

## 2023-10-23 ENCOUNTER — PHARMACY VISIT (OUTPATIENT)
Dept: PHARMACY | Facility: CLINIC | Age: 78
End: 2023-10-23
Payer: MEDICARE

## 2023-10-23 ENCOUNTER — HOSPITAL ENCOUNTER (OUTPATIENT)
Dept: RADIOLOGY | Facility: HOSPITAL | Age: 78
Setting detail: OUTPATIENT SURGERY
Discharge: HOME | End: 2023-10-23
Payer: MEDICARE

## 2023-10-23 DIAGNOSIS — M25.559 HIP PAIN, ACUTE: ICD-10-CM

## 2023-10-23 DIAGNOSIS — M16.12 PRIMARY OSTEOARTHRITIS OF LEFT HIP: Primary | ICD-10-CM

## 2023-10-23 DIAGNOSIS — M16.12 OSTEOARTHRITIS OF LEFT HIP, UNSPECIFIED OSTEOARTHRITIS TYPE: ICD-10-CM

## 2023-10-23 PROCEDURE — 7100000002 HC RECOVERY ROOM TIME - EACH INCREMENTAL 1 MINUTE: Performed by: ORTHOPAEDIC SURGERY

## 2023-10-23 PROCEDURE — 3600000017 HC OR TIME - EACH INCREMENTAL 1 MINUTE - PROCEDURE LEVEL SIX: Performed by: ORTHOPAEDIC SURGERY

## 2023-10-23 PROCEDURE — 2500000001 HC RX 250 WO HCPCS SELF ADMINISTERED DRUGS (ALT 637 FOR MEDICARE OP): Performed by: NURSE PRACTITIONER

## 2023-10-23 PROCEDURE — 2780000003 HC OR 278 NO HCPCS: Performed by: ORTHOPAEDIC SURGERY

## 2023-10-23 PROCEDURE — 72170 X-RAY EXAM OF PELVIS: CPT | Performed by: RADIOLOGY

## 2023-10-23 PROCEDURE — 2720000007 HC OR 272 NO HCPCS: Performed by: ORTHOPAEDIC SURGERY

## 2023-10-23 PROCEDURE — 99221 1ST HOSP IP/OBS SF/LOW 40: CPT | Performed by: STUDENT IN AN ORGANIZED HEALTH CARE EDUCATION/TRAINING PROGRAM

## 2023-10-23 PROCEDURE — 7100000001 HC RECOVERY ROOM TIME - INITIAL BASE CHARGE: Performed by: ORTHOPAEDIC SURGERY

## 2023-10-23 PROCEDURE — 72170 X-RAY EXAM OF PELVIS: CPT

## 2023-10-23 PROCEDURE — C1776 JOINT DEVICE (IMPLANTABLE): HCPCS | Performed by: ORTHOPAEDIC SURGERY

## 2023-10-23 PROCEDURE — A6213 FOAM DRG >16<=48 SQ IN W/BDR: HCPCS | Performed by: ORTHOPAEDIC SURGERY

## 2023-10-23 PROCEDURE — 2500000004 HC RX 250 GENERAL PHARMACY W/ HCPCS (ALT 636 FOR OP/ED): Performed by: NURSE ANESTHETIST, CERTIFIED REGISTERED

## 2023-10-23 PROCEDURE — 88304 TISSUE EXAM BY PATHOLOGIST: CPT | Performed by: STUDENT IN AN ORGANIZED HEALTH CARE EDUCATION/TRAINING PROGRAM

## 2023-10-23 PROCEDURE — 88311 DECALCIFY TISSUE: CPT | Performed by: STUDENT IN AN ORGANIZED HEALTH CARE EDUCATION/TRAINING PROGRAM

## 2023-10-23 PROCEDURE — 96372 THER/PROPH/DIAG INJ SC/IM: CPT | Performed by: ORTHOPAEDIC SURGERY

## 2023-10-23 PROCEDURE — 3700000001 HC GENERAL ANESTHESIA TIME - INITIAL BASE CHARGE: Performed by: ORTHOPAEDIC SURGERY

## 2023-10-23 PROCEDURE — 76000 FLUOROSCOPY <1 HR PHYS/QHP: CPT

## 2023-10-23 PROCEDURE — 27130 TOTAL HIP ARTHROPLASTY: CPT | Performed by: ORTHOPAEDIC SURGERY

## 2023-10-23 PROCEDURE — 88311 DECALCIFY TISSUE: CPT | Mod: TC,GEALAB | Performed by: ORTHOPAEDIC SURGERY

## 2023-10-23 PROCEDURE — A27130 PR TOTAL HIP ARTHROPLASTY: Performed by: ANESTHESIOLOGY

## 2023-10-23 PROCEDURE — A27130 PR TOTAL HIP ARTHROPLASTY: Performed by: NURSE ANESTHETIST, CERTIFIED REGISTERED

## 2023-10-23 PROCEDURE — 2500000005 HC RX 250 GENERAL PHARMACY W/O HCPCS: Performed by: ORTHOPAEDIC SURGERY

## 2023-10-23 PROCEDURE — 2500000004 HC RX 250 GENERAL PHARMACY W/ HCPCS (ALT 636 FOR OP/ED): Performed by: ANESTHESIOLOGY

## 2023-10-23 PROCEDURE — 2500000004 HC RX 250 GENERAL PHARMACY W/ HCPCS (ALT 636 FOR OP/ED): Performed by: NURSE PRACTITIONER

## 2023-10-23 PROCEDURE — G0378 HOSPITAL OBSERVATION PER HR: HCPCS

## 2023-10-23 PROCEDURE — 3600000018 HC OR TIME - INITIAL BASE CHARGE - PROCEDURE LEVEL SIX: Performed by: ORTHOPAEDIC SURGERY

## 2023-10-23 PROCEDURE — 2500000005 HC RX 250 GENERAL PHARMACY W/O HCPCS: Performed by: NURSE ANESTHETIST, CERTIFIED REGISTERED

## 2023-10-23 PROCEDURE — 2500000004 HC RX 250 GENERAL PHARMACY W/ HCPCS (ALT 636 FOR OP/ED): Performed by: ORTHOPAEDIC SURGERY

## 2023-10-23 PROCEDURE — 2500000005 HC RX 250 GENERAL PHARMACY W/O HCPCS: Performed by: NURSE PRACTITIONER

## 2023-10-23 PROCEDURE — 3700000002 HC GENERAL ANESTHESIA TIME - EACH INCREMENTAL 1 MINUTE: Performed by: ORTHOPAEDIC SURGERY

## 2023-10-23 PROCEDURE — 99100 ANES PT EXTEME AGE<1 YR&>70: CPT | Performed by: ANESTHESIOLOGY

## 2023-10-23 PROCEDURE — RXMED WILLOW AMBULATORY MEDICATION CHARGE

## 2023-10-23 DEVICE — SHELL, TRIDENT II, CLUSTERHOLE, 52E: Type: IMPLANTABLE DEVICE | Site: HIP | Status: FUNCTIONAL

## 2023-10-23 DEVICE — HEAD, FEMUR V40 36MM 0MM BIOLOX DELTA: Type: IMPLANTABLE DEVICE | Site: HIP | Status: FUNCTIONAL

## 2023-10-23 DEVICE — INSERT, TRIDENT X3 POLYETHYLENE, 0 DEG, 36MM E: Type: IMPLANTABLE DEVICE | Site: HIP | Status: FUNCTIONAL

## 2023-10-23 DEVICE — IMPLANTABLE DEVICE: Type: IMPLANTABLE DEVICE | Site: HIP | Status: FUNCTIONAL

## 2023-10-23 RX ORDER — ASPIRIN 81 MG/1
81 TABLET ORAL 2 TIMES DAILY
Status: DISCONTINUED | OUTPATIENT
Start: 2023-10-24 | End: 2023-10-23

## 2023-10-23 RX ORDER — NAPROXEN 375 MG/1
375 TABLET ORAL 2 TIMES DAILY
Status: DISCONTINUED | OUTPATIENT
Start: 2023-10-23 | End: 2023-10-23

## 2023-10-23 RX ORDER — MULTIVIT-MIN/IRON FUM/FOLIC AC 7.5 MG-4
1 TABLET ORAL DAILY
Status: DISCONTINUED | OUTPATIENT
Start: 2023-10-23 | End: 2023-10-24 | Stop reason: HOSPADM

## 2023-10-23 RX ORDER — CEFAZOLIN SODIUM 2 G/100ML
2 INJECTION, SOLUTION INTRAVENOUS ONCE
Status: DISCONTINUED | OUTPATIENT
Start: 2023-10-23 | End: 2023-10-23 | Stop reason: HOSPADM

## 2023-10-23 RX ORDER — SODIUM CHLORIDE, SODIUM LACTATE, POTASSIUM CHLORIDE, CALCIUM CHLORIDE 600; 310; 30; 20 MG/100ML; MG/100ML; MG/100ML; MG/100ML
100 INJECTION, SOLUTION INTRAVENOUS CONTINUOUS
Status: DISCONTINUED | OUTPATIENT
Start: 2023-10-23 | End: 2023-10-24 | Stop reason: HOSPADM

## 2023-10-23 RX ORDER — ONDANSETRON HYDROCHLORIDE 2 MG/ML
4 INJECTION, SOLUTION INTRAVENOUS EVERY 8 HOURS PRN
Status: DISCONTINUED | OUTPATIENT
Start: 2023-10-23 | End: 2023-10-24 | Stop reason: HOSPADM

## 2023-10-23 RX ORDER — CHOLECALCIFEROL (VITAMIN D3) 25 MCG
2000 TABLET ORAL DAILY
Status: DISCONTINUED | OUTPATIENT
Start: 2023-10-23 | End: 2023-10-24 | Stop reason: HOSPADM

## 2023-10-23 RX ORDER — PANTOPRAZOLE SODIUM 20 MG/1
20 TABLET, DELAYED RELEASE ORAL
Status: DISCONTINUED | OUTPATIENT
Start: 2023-10-24 | End: 2023-10-24 | Stop reason: HOSPADM

## 2023-10-23 RX ORDER — TRANEXAMIC ACID 100 MG/ML
INJECTION, SOLUTION INTRAVENOUS AS NEEDED
Status: DISCONTINUED | OUTPATIENT
Start: 2023-10-23 | End: 2023-10-23

## 2023-10-23 RX ORDER — POLYETHYLENE GLYCOL 3350 17 G/17G
17 POWDER, FOR SOLUTION ORAL DAILY
Status: DISCONTINUED | OUTPATIENT
Start: 2023-10-23 | End: 2023-10-24 | Stop reason: HOSPADM

## 2023-10-23 RX ORDER — DOCUSATE SODIUM 100 MG/1
100 CAPSULE, LIQUID FILLED ORAL 2 TIMES DAILY
Status: DISCONTINUED | OUTPATIENT
Start: 2023-10-23 | End: 2023-10-24 | Stop reason: HOSPADM

## 2023-10-23 RX ORDER — OXYCODONE AND ACETAMINOPHEN 5; 325 MG/1; MG/1
1 TABLET ORAL AS NEEDED
Status: DISCONTINUED | OUTPATIENT
Start: 2023-10-23 | End: 2023-10-24 | Stop reason: HOSPADM

## 2023-10-23 RX ORDER — EPINEPHRINE 1 MG/ML
INJECTION INTRAMUSCULAR; INTRAVENOUS; SUBCUTANEOUS AS NEEDED
Status: DISCONTINUED | OUTPATIENT
Start: 2023-10-23 | End: 2023-10-23 | Stop reason: HOSPADM

## 2023-10-23 RX ORDER — SODIUM CHLORIDE, SODIUM LACTATE, POTASSIUM CHLORIDE, CALCIUM CHLORIDE 600; 310; 30; 20 MG/100ML; MG/100ML; MG/100ML; MG/100ML
100 INJECTION, SOLUTION INTRAVENOUS CONTINUOUS
Status: DISCONTINUED | OUTPATIENT
Start: 2023-10-23 | End: 2023-10-23 | Stop reason: HOSPADM

## 2023-10-23 RX ORDER — ROPIVACAINE HYDROCHLORIDE 5 MG/ML
INJECTION, SOLUTION EPIDURAL; INFILTRATION; PERINEURAL AS NEEDED
Status: DISCONTINUED | OUTPATIENT
Start: 2023-10-23 | End: 2023-10-23 | Stop reason: HOSPADM

## 2023-10-23 RX ORDER — ACETAMINOPHEN 325 MG/1
975 TABLET ORAL ONCE
Status: COMPLETED | OUTPATIENT
Start: 2023-10-23 | End: 2023-10-23

## 2023-10-23 RX ORDER — LIDOCAINE HCL/PF 100 MG/5ML
SYRINGE (ML) INTRAVENOUS AS NEEDED
Status: DISCONTINUED | OUTPATIENT
Start: 2023-10-23 | End: 2023-10-23

## 2023-10-23 RX ORDER — ONDANSETRON HYDROCHLORIDE 2 MG/ML
4 INJECTION, SOLUTION INTRAVENOUS ONCE AS NEEDED
Status: DISCONTINUED | OUTPATIENT
Start: 2023-10-23 | End: 2023-10-23 | Stop reason: HOSPADM

## 2023-10-23 RX ORDER — PROPOFOL 10 MG/ML
INJECTION, EMULSION INTRAVENOUS CONTINUOUS PRN
Status: DISCONTINUED | OUTPATIENT
Start: 2023-10-23 | End: 2023-10-23

## 2023-10-23 RX ORDER — NAPROXEN 500 MG/1
500 TABLET ORAL
Status: DISCONTINUED | OUTPATIENT
Start: 2023-10-24 | End: 2023-10-24 | Stop reason: HOSPADM

## 2023-10-23 RX ORDER — CEFAZOLIN SODIUM 2 G/100ML
2 INJECTION, SOLUTION INTRAVENOUS EVERY 8 HOURS
Status: COMPLETED | OUTPATIENT
Start: 2023-10-23 | End: 2023-10-24

## 2023-10-23 RX ORDER — KETOROLAC TROMETHAMINE 30 MG/ML
INJECTION, SOLUTION INTRAMUSCULAR; INTRAVENOUS AS NEEDED
Status: DISCONTINUED | OUTPATIENT
Start: 2023-10-23 | End: 2023-10-23 | Stop reason: HOSPADM

## 2023-10-23 RX ORDER — ASPIRIN 81 MG/1
81 TABLET ORAL 2 TIMES DAILY
Status: DISCONTINUED | OUTPATIENT
Start: 2023-10-24 | End: 2023-10-24 | Stop reason: HOSPADM

## 2023-10-23 RX ORDER — OXYCODONE AND ACETAMINOPHEN 5; 325 MG/1; MG/1
0.5 TABLET ORAL EVERY 4 HOURS PRN
Status: DISCONTINUED | OUTPATIENT
Start: 2023-10-23 | End: 2023-10-24 | Stop reason: HOSPADM

## 2023-10-23 RX ORDER — ROSUVASTATIN CALCIUM 20 MG/1
20 TABLET, COATED ORAL DAILY
Status: DISCONTINUED | OUTPATIENT
Start: 2023-10-23 | End: 2023-10-24 | Stop reason: HOSPADM

## 2023-10-23 RX ORDER — CALCIUM CARBONATE 500(1250)
1200 TABLET ORAL DAILY
Status: DISCONTINUED | OUTPATIENT
Start: 2023-10-24 | End: 2023-10-24 | Stop reason: HOSPADM

## 2023-10-23 RX ORDER — CELECOXIB 400 MG/1
400 CAPSULE ORAL ONCE
Status: COMPLETED | OUTPATIENT
Start: 2023-10-23 | End: 2023-10-23

## 2023-10-23 RX ORDER — BUPIVACAINE HYDROCHLORIDE 7.5 MG/ML
INJECTION, SOLUTION EPIDURAL; RETROBULBAR AS NEEDED
Status: DISCONTINUED | OUTPATIENT
Start: 2023-10-23 | End: 2023-10-23

## 2023-10-23 RX ORDER — UBIDECARENONE 75 MG
500 CAPSULE ORAL
Status: DISCONTINUED | OUTPATIENT
Start: 2023-10-23 | End: 2023-10-24 | Stop reason: HOSPADM

## 2023-10-23 RX ORDER — FERROUS SULFATE, DRIED 160(50) MG
TABLET, EXTENDED RELEASE ORAL DAILY
Qty: 30 TABLET | Refills: 0 | Status: SHIPPED | OUTPATIENT
Start: 2023-10-24

## 2023-10-23 RX ORDER — MORPHINE SULFATE 10 MG/ML
INJECTION, SOLUTION INTRAMUSCULAR; INTRAVENOUS AS NEEDED
Status: DISCONTINUED | OUTPATIENT
Start: 2023-10-23 | End: 2023-10-23 | Stop reason: HOSPADM

## 2023-10-23 RX ORDER — DOCUSATE SODIUM 100 MG/1
100 CAPSULE, LIQUID FILLED ORAL 2 TIMES DAILY
Qty: 28 CAPSULE | Refills: 0 | Status: SHIPPED | OUTPATIENT
Start: 2023-10-23 | End: 2023-11-07

## 2023-10-23 RX ORDER — LEVOTHYROXINE SODIUM 88 UG/1
88 TABLET ORAL DAILY
Status: DISCONTINUED | OUTPATIENT
Start: 2023-10-23 | End: 2023-10-24 | Stop reason: HOSPADM

## 2023-10-23 RX ORDER — FENTANYL CITRATE 50 UG/ML
INJECTION, SOLUTION INTRAMUSCULAR; INTRAVENOUS AS NEEDED
Status: DISCONTINUED | OUTPATIENT
Start: 2023-10-23 | End: 2023-10-23

## 2023-10-23 RX ORDER — NALOXONE HYDROCHLORIDE 0.4 MG/ML
0.2 INJECTION, SOLUTION INTRAMUSCULAR; INTRAVENOUS; SUBCUTANEOUS EVERY 5 MIN PRN
Status: DISCONTINUED | OUTPATIENT
Start: 2023-10-23 | End: 2023-10-24 | Stop reason: HOSPADM

## 2023-10-23 RX ORDER — ASPIRIN 81 MG/1
81 TABLET ORAL 2 TIMES DAILY
Qty: 60 TABLET | Refills: 0 | Status: SHIPPED | OUTPATIENT
Start: 2023-10-24 | End: 2023-11-23

## 2023-10-23 RX ORDER — SODIUM CHLORIDE, SODIUM LACTATE, POTASSIUM CHLORIDE, CALCIUM CHLORIDE 600; 310; 30; 20 MG/100ML; MG/100ML; MG/100ML; MG/100ML
75 INJECTION, SOLUTION INTRAVENOUS CONTINUOUS
Status: DISCONTINUED | OUTPATIENT
Start: 2023-10-23 | End: 2023-10-24 | Stop reason: HOSPADM

## 2023-10-23 RX ORDER — EPINEPHRINE 1 MG/ML
INJECTION INTRAMUSCULAR; INTRAVENOUS; SUBCUTANEOUS AS NEEDED
Status: DISCONTINUED | OUTPATIENT
Start: 2023-10-23 | End: 2023-10-23

## 2023-10-23 RX ORDER — OXYCODONE AND ACETAMINOPHEN 5; 325 MG/1; MG/1
1 TABLET ORAL EVERY 4 HOURS PRN
Qty: 36 TABLET | Refills: 0 | Status: SHIPPED | OUTPATIENT
Start: 2023-10-23 | End: 2023-10-30

## 2023-10-23 RX ORDER — DROPERIDOL 2.5 MG/ML
0.62 INJECTION, SOLUTION INTRAMUSCULAR; INTRAVENOUS ONCE AS NEEDED
Status: DISCONTINUED | OUTPATIENT
Start: 2023-10-23 | End: 2023-10-23 | Stop reason: HOSPADM

## 2023-10-23 RX ORDER — MIDAZOLAM HYDROCHLORIDE 1 MG/ML
INJECTION, SOLUTION INTRAMUSCULAR; INTRAVENOUS AS NEEDED
Status: DISCONTINUED | OUTPATIENT
Start: 2023-10-23 | End: 2023-10-23

## 2023-10-23 RX ORDER — SODIUM CHLORIDE 9 MG/ML
INJECTION INTRAMUSCULAR; INTRAVENOUS; SUBCUTANEOUS AS NEEDED
Status: DISCONTINUED | OUTPATIENT
Start: 2023-10-23 | End: 2023-10-23 | Stop reason: HOSPADM

## 2023-10-23 RX ORDER — ONDANSETRON 4 MG/1
4 TABLET, FILM COATED ORAL EVERY 8 HOURS PRN
Status: DISCONTINUED | OUTPATIENT
Start: 2023-10-23 | End: 2023-10-24 | Stop reason: HOSPADM

## 2023-10-23 RX ORDER — CEFAZOLIN 1 G/1
INJECTION, POWDER, FOR SOLUTION INTRAVENOUS AS NEEDED
Status: DISCONTINUED | OUTPATIENT
Start: 2023-10-23 | End: 2023-10-23

## 2023-10-23 RX ORDER — ONDANSETRON HYDROCHLORIDE 2 MG/ML
INJECTION, SOLUTION INTRAVENOUS AS NEEDED
Status: DISCONTINUED | OUTPATIENT
Start: 2023-10-23 | End: 2023-10-23

## 2023-10-23 RX ADMIN — HYDROMORPHONE HYDROCHLORIDE 0.5 MG: 1 INJECTION, SOLUTION INTRAMUSCULAR; INTRAVENOUS; SUBCUTANEOUS at 16:44

## 2023-10-23 RX ADMIN — FENTANYL CITRATE 25 MCG: 50 INJECTION, SOLUTION INTRAMUSCULAR; INTRAVENOUS at 13:18

## 2023-10-23 RX ADMIN — SODIUM CHLORIDE, SODIUM LACTATE, POTASSIUM CHLORIDE, AND CALCIUM CHLORIDE: 600; 310; 30; 20 INJECTION, SOLUTION INTRAVENOUS at 11:31

## 2023-10-23 RX ADMIN — PROPOFOL 75 MCG/KG/MIN: 10 INJECTION, EMULSION INTRAVENOUS at 13:25

## 2023-10-23 RX ADMIN — CEFAZOLIN SODIUM 2 G: 2 INJECTION, SOLUTION INTRAVENOUS at 21:18

## 2023-10-23 RX ADMIN — CEFAZOLIN 2 G: 1 INJECTION, POWDER, FOR SOLUTION INTRAMUSCULAR; INTRAVENOUS at 13:13

## 2023-10-23 RX ADMIN — DEXAMETHASONE SODIUM PHOSPHATE 8 MG: 4 INJECTION INTRA-ARTICULAR; INTRALESIONAL; INTRAMUSCULAR; INTRAVENOUS; SOFT TISSUE at 13:33

## 2023-10-23 RX ADMIN — DOCUSATE SODIUM 100 MG: 100 CAPSULE, LIQUID FILLED ORAL at 20:03

## 2023-10-23 RX ADMIN — FENTANYL CITRATE 25 MCG: 50 INJECTION, SOLUTION INTRAMUSCULAR; INTRAVENOUS at 13:15

## 2023-10-23 RX ADMIN — SODIUM CHLORIDE, POTASSIUM CHLORIDE, SODIUM LACTATE AND CALCIUM CHLORIDE 100 ML/HR: 600; 310; 30; 20 INJECTION, SOLUTION INTRAVENOUS at 10:48

## 2023-10-23 RX ADMIN — CELECOXIB 400 MG: 400 CAPSULE ORAL at 10:52

## 2023-10-23 RX ADMIN — BUPIVACAINE HYDROCHLORIDE 12 MG: 7.5 INJECTION, SOLUTION EPIDURAL; RETROBULBAR at 13:23

## 2023-10-23 RX ADMIN — LIDOCAINE HYDROCHLORIDE 50 MG: 20 INJECTION INTRAVENOUS at 13:25

## 2023-10-23 RX ADMIN — ACETAMINOPHEN 975 MG: 325 TABLET ORAL at 10:52

## 2023-10-23 RX ADMIN — POVIDONE-IODINE 1 APPLICATION: 5 SOLUTION TOPICAL at 10:52

## 2023-10-23 RX ADMIN — MIDAZOLAM 1 MG: 1 INJECTION INTRAMUSCULAR; INTRAVENOUS at 13:10

## 2023-10-23 RX ADMIN — TRANEXAMIC ACID 600 MG: 1 INJECTION, SOLUTION INTRAVENOUS at 14:53

## 2023-10-23 RX ADMIN — OXYCODONE HYDROCHLORIDE AND ACETAMINOPHEN 1 TABLET: 5; 325 TABLET ORAL at 20:00

## 2023-10-23 RX ADMIN — TRANEXAMIC ACID 600 MG: 1 INJECTION, SOLUTION INTRAVENOUS at 13:20

## 2023-10-23 RX ADMIN — ONDANSETRON 4 MG: 2 INJECTION INTRAMUSCULAR; INTRAVENOUS at 14:55

## 2023-10-23 RX ADMIN — Medication 21 PERCENT: at 18:15

## 2023-10-23 SDOH — SOCIAL STABILITY: SOCIAL INSECURITY: ARE THERE ANY APPARENT SIGNS OF INJURIES/BEHAVIORS THAT COULD BE RELATED TO ABUSE/NEGLECT?: NO

## 2023-10-23 SDOH — ECONOMIC STABILITY: INCOME INSECURITY: IN THE PAST 12 MONTHS, HAS THE ELECTRIC, GAS, OIL, OR WATER COMPANY THREATENED TO SHUT OFF SERVICE IN YOUR HOME?: NO

## 2023-10-23 SDOH — ECONOMIC STABILITY: TRANSPORTATION INSECURITY
IN THE PAST 12 MONTHS, HAS THE LACK OF TRANSPORTATION KEPT YOU FROM MEDICAL APPOINTMENTS OR FROM GETTING MEDICATIONS?: NO

## 2023-10-23 SDOH — HEALTH STABILITY: MENTAL HEALTH: HOW MANY STANDARD DRINKS CONTAINING ALCOHOL DO YOU HAVE ON A TYPICAL DAY?: 1 OR 2

## 2023-10-23 SDOH — SOCIAL STABILITY: SOCIAL INSECURITY: WERE YOU ABLE TO COMPLETE ALL THE BEHAVIORAL HEALTH SCREENINGS?: YES

## 2023-10-23 SDOH — HEALTH STABILITY: MENTAL HEALTH: HOW OFTEN DO YOU HAVE A DRINK CONTAINING ALCOHOL?: MONTHLY OR LESS

## 2023-10-23 SDOH — ECONOMIC STABILITY: INCOME INSECURITY: IN THE LAST 12 MONTHS, WAS THERE A TIME WHEN YOU WERE NOT ABLE TO PAY THE MORTGAGE OR RENT ON TIME?: NO

## 2023-10-23 SDOH — SOCIAL STABILITY: SOCIAL INSECURITY: ARE YOU OR HAVE YOU BEEN THREATENED OR ABUSED PHYSICALLY, EMOTIONALLY, OR SEXUALLY BY ANYONE?: NO

## 2023-10-23 SDOH — SOCIAL STABILITY: SOCIAL INSECURITY: HAVE YOU HAD THOUGHTS OF HARMING ANYONE ELSE?: NO

## 2023-10-23 SDOH — SOCIAL STABILITY: SOCIAL INSECURITY: DOES ANYONE TRY TO KEEP YOU FROM HAVING/CONTACTING OTHER FRIENDS OR DOING THINGS OUTSIDE YOUR HOME?: NO

## 2023-10-23 SDOH — SOCIAL STABILITY: SOCIAL INSECURITY: ABUSE: ADULT

## 2023-10-23 SDOH — HEALTH STABILITY: MENTAL HEALTH: CURRENT SMOKER: 0

## 2023-10-23 SDOH — SOCIAL STABILITY: SOCIAL INSECURITY: DO YOU FEEL ANYONE HAS EXPLOITED OR TAKEN ADVANTAGE OF YOU FINANCIALLY OR OF YOUR PERSONAL PROPERTY?: NO

## 2023-10-23 SDOH — SOCIAL STABILITY: SOCIAL INSECURITY: DOES ANYONE TRY TO KEEP YOU FROM HAVING/CONTACTING OTHER FRIENDS OR DOING THINGS OUTSIDE YOUR HOME?: YES

## 2023-10-23 SDOH — SOCIAL STABILITY: SOCIAL INSECURITY: DO YOU FEEL ANYONE HAS EXPLOITED OR TAKEN ADVANTAGE OF YOU FINANCIALLY OR OF YOUR PERSONAL PROPERTY?: YES

## 2023-10-23 SDOH — ECONOMIC STABILITY: HOUSING INSECURITY: IN THE LAST 12 MONTHS, HOW MANY PLACES HAVE YOU LIVED?: 1

## 2023-10-23 SDOH — ECONOMIC STABILITY: HOUSING INSECURITY
IN THE LAST 12 MONTHS, WAS THERE A TIME WHEN YOU DID NOT HAVE A STEADY PLACE TO SLEEP OR SLEPT IN A SHELTER (INCLUDING NOW)?: NO

## 2023-10-23 SDOH — SOCIAL STABILITY: SOCIAL INSECURITY: ARE YOU OR HAVE YOU BEEN THREATENED OR ABUSED PHYSICALLY, EMOTIONALLY, OR SEXUALLY BY ANYONE?: YES

## 2023-10-23 SDOH — SOCIAL STABILITY: SOCIAL INSECURITY: HAS ANYONE EVER THREATENED TO HURT YOUR FAMILY OR YOUR PETS?: NO

## 2023-10-23 SDOH — ECONOMIC STABILITY: INCOME INSECURITY: HOW HARD IS IT FOR YOU TO PAY FOR THE VERY BASICS LIKE FOOD, HOUSING, MEDICAL CARE, AND HEATING?: NOT HARD AT ALL

## 2023-10-23 SDOH — ECONOMIC STABILITY: TRANSPORTATION INSECURITY
IN THE PAST 12 MONTHS, HAS LACK OF TRANSPORTATION KEPT YOU FROM MEETINGS, WORK, OR FROM GETTING THINGS NEEDED FOR DAILY LIVING?: NO

## 2023-10-23 SDOH — HEALTH STABILITY: MENTAL HEALTH: HOW OFTEN DO YOU HAVE 6 OR MORE DRINKS ON ONE OCCASION?: LESS THAN MONTHLY

## 2023-10-23 SDOH — SOCIAL STABILITY: SOCIAL INSECURITY: DO YOU FEEL UNSAFE GOING BACK TO THE PLACE WHERE YOU ARE LIVING?: YES

## 2023-10-23 SDOH — SOCIAL STABILITY: SOCIAL INSECURITY: DO YOU FEEL UNSAFE GOING BACK TO THE PLACE WHERE YOU ARE LIVING?: NO

## 2023-10-23 SDOH — SOCIAL STABILITY: SOCIAL INSECURITY: ARE THERE ANY APPARENT SIGNS OF INJURIES/BEHAVIORS THAT COULD BE RELATED TO ABUSE/NEGLECT?: YES

## 2023-10-23 ASSESSMENT — LIFESTYLE VARIABLES
AUDIT-C TOTAL SCORE: 1
AUDIT-C TOTAL SCORE: 1
HOW MANY STANDARD DRINKS CONTAINING ALCOHOL DO YOU HAVE ON A TYPICAL DAY: 1 OR 2
HOW MANY STANDARD DRINKS CONTAINING ALCOHOL DO YOU HAVE ON A TYPICAL DAY: 1 OR 2
AUDIT-C TOTAL SCORE: 1
HOW OFTEN DO YOU HAVE A DRINK CONTAINING ALCOHOL: MONTHLY OR LESS
AUDIT-C TOTAL SCORE: 1
AUDIT-C TOTAL SCORE: 2
AUDIT-C TOTAL SCORE: 1
SKIP TO QUESTIONS 9-10: 0
HOW OFTEN DO YOU HAVE 6 OR MORE DRINKS ON ONE OCCASION: NEVER
AUDIT-C TOTAL SCORE: 1
SKIP TO QUESTIONS 9-10: 1
HOW OFTEN DO YOU HAVE 6 OR MORE DRINKS ON ONE OCCASION: NEVER
SKIP TO QUESTIONS 9-10: 1
HOW OFTEN DO YOU HAVE A DRINK CONTAINING ALCOHOL: MONTHLY OR LESS
HOW OFTEN DO YOU HAVE A DRINK CONTAINING ALCOHOL: MONTHLY OR LESS
HOW MANY STANDARD DRINKS CONTAINING ALCOHOL DO YOU HAVE ON A TYPICAL DAY: 1 OR 2
HOW OFTEN DO YOU HAVE 6 OR MORE DRINKS ON ONE OCCASION: NEVER
SKIP TO QUESTIONS 9-10: 1

## 2023-10-23 ASSESSMENT — COGNITIVE AND FUNCTIONAL STATUS - GENERAL
MOBILITY SCORE: 20
HELP NEEDED FOR BATHING: A LITTLE
DAILY ACTIVITIY SCORE: 21
DAILY ACTIVITIY SCORE: 22
DRESSING REGULAR LOWER BODY CLOTHING: A LITTLE
MOBILITY SCORE: 18
DRESSING REGULAR LOWER BODY CLOTHING: A LITTLE
STANDING UP FROM CHAIR USING ARMS: A LITTLE
TURNING FROM BACK TO SIDE WHILE IN FLAT BAD: A LITTLE
PATIENT BASELINE BEDBOUND: NO
WALKING IN HOSPITAL ROOM: A LITTLE
CLIMB 3 TO 5 STEPS WITH RAILING: A LITTLE
STANDING UP FROM CHAIR USING ARMS: A LITTLE
WALKING IN HOSPITAL ROOM: A LITTLE
MOVING TO AND FROM BED TO CHAIR: A LITTLE
DRESSING REGULAR UPPER BODY CLOTHING: A LITTLE
MOVING FROM LYING ON BACK TO SITTING ON SIDE OF FLAT BED WITH BEDRAILS: A LITTLE
MOVING TO AND FROM BED TO CHAIR: A LITTLE
TOILETING: A LITTLE
CLIMB 3 TO 5 STEPS WITH RAILING: A LITTLE

## 2023-10-23 ASSESSMENT — PAIN SCALES - GENERAL
PAINLEVEL_OUTOF10: 4
PAINLEVEL_OUTOF10: 0 - NO PAIN
PAINLEVEL_OUTOF10: 0 - NO PAIN
PAINLEVEL_OUTOF10: 3
PAINLEVEL_OUTOF10: 7
PAINLEVEL_OUTOF10: 4
PAINLEVEL_OUTOF10: 0 - NO PAIN

## 2023-10-23 ASSESSMENT — ACTIVITIES OF DAILY LIVING (ADL)
JUDGMENT_ADEQUATE_SAFELY_COMPLETE_DAILY_ACTIVITIES: YES
ADEQUATE_TO_COMPLETE_ADL: YES
TOILETING: INDEPENDENT
GROOMING: INDEPENDENT
FEEDING YOURSELF: INDEPENDENT
WALKS IN HOME: INDEPENDENT
BATHING: INDEPENDENT
LACK_OF_TRANSPORTATION: NO
HEARING - RIGHT EAR: FUNCTIONAL
DRESSING YOURSELF: INDEPENDENT
HEARING - LEFT EAR: FUNCTIONAL
PATIENT'S MEMORY ADEQUATE TO SAFELY COMPLETE DAILY ACTIVITIES?: YES

## 2023-10-23 ASSESSMENT — PAIN - FUNCTIONAL ASSESSMENT
PAIN_FUNCTIONAL_ASSESSMENT: 0-10

## 2023-10-23 ASSESSMENT — PAIN SCALES - PAIN ASSESSMENT IN ADVANCED DEMENTIA (PAINAD)
BODYLANGUAGE: RELAXED
CONSOLABILITY: NO NEED TO CONSOLE
TOTALSCORE: 0
FACIALEXPRESSION: SMILING OR INEXPRESSIVE
BREATHING: NORMAL

## 2023-10-23 ASSESSMENT — PATIENT HEALTH QUESTIONNAIRE - PHQ9
1. LITTLE INTEREST OR PLEASURE IN DOING THINGS: NOT AT ALL
1. LITTLE INTEREST OR PLEASURE IN DOING THINGS: NOT AT ALL
2. FEELING DOWN, DEPRESSED OR HOPELESS: NOT AT ALL
SUM OF ALL RESPONSES TO PHQ9 QUESTIONS 1 & 2: 0

## 2023-10-23 NOTE — H&P
H&P reviewed from 10/13/23. The patient was examined and there are no changes to the H&P.    Masoud Topete MD  Orthopedic Surgery PGY5

## 2023-10-23 NOTE — ANESTHESIA PROCEDURE NOTES
Spinal Block    Patient location during procedure: OR  Start time: 10/23/2023 1:18 PM  End time: 10/23/2023 1:23 PM  Reason for block: primary anesthetic  Staffing  Performed: SRNA   Authorized by: German Segura MD    Performed by: ADRIEL Gauthier    Preanesthetic Checklist  Completed: patient identified, IV checked, risks and benefits discussed, surgical consent, pre-op evaluation, timeout performed and sterile techniques followed  Block Timeout  RN/Licensed healthcare professional reads aloud to the Anesthesia provider and entire team: Patient identity, procedure with side and site, patient position, and as applicable the availability of implants/special equipment/special requirements.    Timeout performed at: 10/23/2023 1:18 PM  Spinal Block  Patient position: sitting  Prep: Betadine  Sterility prep: cap, gloves and drape  Sedation level: moderate sedation  Patient monitoring: blood pressure and continuous pulse oximetry  Approach: midline  Vertebral space: L3-4  Injection technique: single-shot  Needle  Needle type: pencil-point (Pencan)   Needle gauge: 25 G  Free flowing CSF: yes    Assessment  Sensory level: T10  Block outcome: patient comfortable  Procedure assessment: patient tolerated procedure well with no immediate complications  Additional Notes  SRNA performed spinal.  Easy spinal and patient tolerated well.

## 2023-10-23 NOTE — CONSULTS
Inpatient consult to Medicine  Consult performed by: Jaison Kamara MD  Consult ordered by: Yuliet Hamilton, APRN-CNP  Reason for consult: postop medical management        History Of Present Illness  Laura Banuelos is a 78 y.o. female with history of A-fib status post ablation no longer on anticoagulation, history of Graves' disease status post remote radiation with iatrogenic hypothyroidism on Synthroid, history of ischemic colitis. She underwent elective left hip replacement 10/23/23.     Periop course was uneventful. EBL 250cc. Seen post op, pain is well controlled with some soreness at incision site. Denies headache, nausea, dizziness. Sensation intact in BLEs.      Past Medical History  She has a past medical history of Hypothyroidism due to medicaments and other exogenous substances (11/17/2015), Long term (current) use of anticoagulants (12/14/2016), Nonscarring hair loss, unspecified (11/23/2015), Other specified postprocedural states (12/14/2016), Other specified postprocedural states (12/16/2016), Personal history of other diseases of the circulatory system (11/17/2015), Personal history of other endocrine, nutritional and metabolic disease (11/17/2015), Personal history of other infectious and parasitic diseases (02/16/2018), Personal history of other specified conditions (12/07/2017), and Vascular disorder of intestine, unspecified (CMS/HCC) (07/05/2017).    Surgical History  She has a past surgical history that includes Appendectomy (11/23/2015); Colonoscopy (08/2023); and Ablation of dysrhythmic focus (2015).     Social History  She reports that she has quit smoking. Her smoking use included cigarettes. She has never used smokeless tobacco. She reports current alcohol use. She reports that she does not use drugs.    Family History  Family History   Problem Relation Name Age of Onset    Leukemia Mother      Other (cardiac disorder) Father          Allergies  Patient has no known allergies.      Review  of Systems  11-point ROS was performed and is negative except as noted in the HPI.     Physical Exam   CON: awake, alert, minimal distress;   EYES: conjunctiva wnl; PERRL; EOMI  ENMT: hearing intact; MMM;   CV: S1 S2 - RRR with no m/g/r; no lower extremity edema; normal JVP; symmetric pulses  RESP: normal work of breathing; lungs CTAB;   MSK: left hip dressing CDI; BLE NVI   NEURO: language and speech wnl; sensation and motor function grossly intact   PSYCH: oriented to situation; affect normal;        Last Recorded Vitals  /67   Pulse 65   Temp 36.5 °C (97.7 °F)   Resp 16   Wt 55 kg (121 lb 4.1 oz)   SpO2 94%     Relevant Results  Lab Results   Component Value Date    WBC 6.9 10/13/2023    HGB 13.1 10/13/2023    HCT 40.9 10/13/2023    MCV 95 10/13/2023     10/13/2023         Assessment/Plan   Laura Banuelos is a 78 y.o. female with history of A-fib status post ablation no longer on anticoagulation, history of Graves' disease status post remote radiation with iatrogenic hypothyroidism on Synthroid, history of ischemic colitis. She underwent elective left hip replacement 10/23/23.     Postoperative management:   - vitals stable  - no O2 requirement  - orthopedics following  - pain mgmt and bowel regimen   - DVT ppx per ortho  - perioperative abx and IVF per ortho   - PT/OT    Hypothyroid:  Levothyroxine    History of ischemic colitis:  Continue aspirin, statin    Dispo: Per primary, no medical contraindications to discharge        Jaison Kamara MD

## 2023-10-23 NOTE — OP NOTE
LEFT TOTAL HIP REPLACEMENT DIRECT ANTERIOR APPROACH (L) Operative Note     Date: 10/23/2023  OR Location: [facility] OR    Name: Laura Banuelos, : 1945, Age: 78 y.o., MRN: [id number], Sex: female    Diagnosis  Left hip arthritis same     Procedures    * LEFT TOTAL HIP REPLACEMENT DIRECT ANTERIOR APPROACH    Surgeons      * Josh Mosqueda - Primary    Resident/Fellow/Other Assistant:  Surgeon(s) and Role: miguel a [email or web address], damien assist    Procedure Summary  Anesthesia: Spinal  ASA: III  Anesthesia Staff: Anesthesiologist: German Segura MD  CRNA: KAMLESH Gauthier-CRNA  Estimated Blood Loss: 250mL  Intra-op Medications: * Intraprocedure medication information is unavailable because the case start and end events have not been set *           Anesthesia Record               Intraprocedure I/O Totals          Intake    LR 1100.00 mL    Propofol Drip 0.00 mL    The total shown is the total volume documented since Anesthesia Start was filed.    Total Intake 1100 mL       Output    Est. Blood Loss 250 mL    Total Output 250 mL       Net    Net Volume 850 mL          Specimen:   ID Type Source Tests Collected by Time   1 : left femoral head Tissue FEMORAL HEAD LEFT SURGICAL PATHOLOGY EXAM Josh Mosqueda MD 10/23/2023 1406        Staff:   Relief Circulator: Gloria Rosenthal RN  Relief Scrub: Al Haynes  Scrub Person: Rayna Adkins RN         Drains and/or Catheters: * None in log *    Tourniquet Times:         Implants:  Implants       Type Name Action Serial No.      Joint INSERT, TRIDENT X3 POLYETHYLENE, 0 DEG, 36MM E - [id number] Implanted      Joint SHELL, TRIDENT II, CLUSTERHOLE, 52E - [id number] Implanted      Joint STEM, ACCOLADE II, 127D SZ 7 - [id number] Implanted      Joint HEAD, FEMUR V40 36MM 0MM BIOLOX DELTA - [id number] Implanted               Findings: Severe DJD    Indications: Laura Banuelos is an 78 y.o. female who is having surgery for * No pre-op diagnosis entered *.  Severe arthritis    The patient was  seen in the preoperative area. The risks, benefits, complications, treatment options, non-operative alternatives, expected recovery and outcomes were discussed with the patient. The possibilities of reaction to medication, pulmonary aspiration, injury to surrounding structures, bleeding, recurrent infection, the need for additional procedures, failure to diagnose a condition, and creating a complication requiring transfusion or operation were discussed with the patient. The patient concurred with the proposed plan, giving informed consent.  The site of surgery was properly noted/marked if necessary per policy. The patient has been actively warmed in preoperative area. Preoperative antibiotics have been ordered and given within 1 hours of incision. Venous thrombosis prophylaxis have been ordered including bilateral sequential compression devices and chemical prophylaxis    Procedure Details: Statement of medical necessity:    This is a 78-year-old female with severe degenerative disease of the left hip hip that has failed non operative management. the risks, benefits and alternatives of total hip arthroplasty were discussed with the patient and they signed informed consent.      DESCRIPTION OF PROCEDURE:  The patient was brought to the operative room, and anesthesia was initiated by the anesthesia team. Appropriate preoperative antibiotics were given. The patient was placed supine on a standard operating room table, rectangular bump was placed under the sacrum to facilitate access to the joint.. The patient was prepped and draped in the usual sterile fashion, a time out was performed, the patient was identified by name and medical record number and the laterality and site of surgery were confirmed by all present.  Standard direct anterior approach to the hip was made. Hemostasis was obtained with Bovie electrocautery. Anterior and superior capsulectomy was performed in the usual fashion. Femoral neck osteotomy was  performed in accordance with the preoperative plan, and femoral head extracted from the acetabulum without difficulty. There was noted to be loss of articular cartilage from the femoral head and acetabulum as well as osteophyte formation on both the femoral and acetabular sides.     Periacetabular retractors were placed, with excellent exposure of the acetabulum being obtained. Remnants of the acetabular labrum were excised. The medial wall of the acetabulum was developed with a reamer 5 mm smaller than the preoperative plan. Reaming then continued circumferentially. The final reamer was line to line with the the definitive implant size. Good quality bone was noted. After irrigation, the acetabular component was inserted with the appropriate degree of abduction and anteversion based upon anatomic landmarks. Appropriate positioning was confirmed with an AP image of the pelvis and AP of the operative hip using C-arm. After terminal impaction, the highly cross-linked polyethylene liner was inserted into the acetabular component and locking mechanism engaged in the usual fashion.    Attention was then directed to the femoral side. The femoral elevating hook was inserted. Further soft tissue release was performed to allow anterior translation of the proximal femur. This included release of the obturator internus tendon. The obturator externus was preserved. After adequate mobilization of the femur, the medullary canal was developed with a curved curette followed by a reverse cutting rasp. The proximal femur is then prepared using sequentially larger broaches up to the definitive implant size. The final broach was both rotationally and longitudinally stable. The hip was reduced and flouro was used to assess the implant. There was excellent fit and fill of the implant and the leg lengths looked appropriate comparing the lesser tuberosities to the ischium on both sides. Stability was then tested; with 60 degrees of external  rotation of the hip and full extension the implant was stable.     Trial implant was removed from the femur. The definitive implant was inserted. The trunnion of the femoral component was then cleaned and dried, followed by impaction of the definitive prosthetic femoral head. The hip was reduced with normal findings again noted.    The wound was irrigated with irrisept solution followed by normal saline. The pericapsular soft tissues were injected with ropivicaine, epinephrine, toradol,  and duramorph. The myofascia was closed using interrupted #1 polysorb, then #2 quill, followed by skin closure using inverted 2-0 poly, 3-0 v-lock in the subcuticular layer, and perineo dressing.  Mepilex AG silver impregnated dressing was then placed. Patient was awoken from anesthesia by the anesthesia team and brought to the pacu in stable condition    Post operative plan: patient may bear weight as tolerated, anterior hip precautions, antibiotics and dvt prophylaxis per protocol, standard post operative supportive care     Statement of staff presence: I was present during all key and critical portions of the procedure and immediately available during closure.        Complications:  None; patient tolerated the procedure well.    Disposition: PACU - hemodynamically stable.  Condition: stable         Additional Details: None    Attending Attestation: I was present and scrubbed for the key portions of the procedure.    Josh Mosqueda  Phone Number: [phone]

## 2023-10-23 NOTE — ANESTHESIA PREPROCEDURE EVALUATION
Patient: Laura Banuelos    Procedure Information       Date/Time: 10/23/23 1200    Procedure: LEFT TOTAL HIP REPLACEMENT DIRECT ANTERIOR APPROACH (Left: Hip)    Location: [facility] OR 03 / [facility]    Surgeons: Josh Mosqueda MD          Vitals:    10/23/23 1003   BP: 169/67   Pulse: 68   Resp: 17   Temp: 36.6 °C (97.9 °F)   SpO2: 99%       Past Surgical History:   Procedure Laterality Date    ABLATION OF DYSRHYTHMIC FOCUS  2015    APPENDECTOMY  11/23/2015    Appendectomy    COLONOSCOPY  08/2023    Complete Colonoscopy     Past Medical History:   Diagnosis Date    Hypothyroidism due to medicaments and other exogenous substances 11/17/2015    Iatrogenic hypothyroidism    Long term (current) use of anticoagulants 12/14/2016    Anticoagulated on warfarin    Nonscarring hair loss, unspecified 11/23/2015    Hair loss    Other specified postprocedural states 12/14/2016    Status post circumferential ablation of pulmonary vein    Other specified postprocedural states 12/16/2016    S/P ablation of atrial fibrillation    Personal history of other diseases of the circulatory system 11/17/2015    History of atrial fibrillation    Personal history of other endocrine, nutritional and metabolic disease 11/17/2015    History of Graves' disease    Personal history of other infectious and parasitic diseases 02/16/2018    History of herpes zoster    Personal history of other specified conditions 12/07/2017    History of headache    Vascular disorder of intestine, unspecified (CMS/HCC) 07/05/2017    Ischemic colitis       Current Facility-Administered Medications:     [Held by provider] aspirin EC tablet 81 mg, 81 mg, oral, BID, RENARD Schwartz    [Held by provider] calcium carbonate tablet 1,200 mg, 1,200 mg, oral, Daily, RENARD Schwartz    ceFAZolin in dextrose (iso-os) (Ancef) IVPB 2 g, 2 g, intravenous, Once, RENARD Schwartz    [Held by provider] cholecalciferol (Vitamin D-3) tablet 2,000 Units,  2,000 Units, oral, Daily, RENARD Schwartz    [Held by provider] cyanocobalamin (Vitamin B-12) tablet 500 mcg, 500 mcg, oral, Daily, RENARD Schwartz    lactated Ringer's infusion, 100 mL/hr, intravenous, Continuous, German Segura MD, Last Rate: 100 mL/hr at 10/23/23 1048, 100 mL/hr at 10/23/23 1048    [Held by provider] levothyroxine (Synthroid, Levoxyl) tablet 88 mcg, 88 mcg, oral, Daily, RENARD Schwartz    [Held by provider] multivitamin 1 tablet, 1 tablet, oral, Daily, RENARD Schwartz    [Held by provider] naproxen (Naprosyn) tablet 375 mg, 375 mg, oral, BID, RENARD Schwartz    [Held by provider] pantoprazole (ProtoNix) EC tablet 20 mg, 20 mg, oral, Daily before breakfast, RENARD Schwartz    [Held by provider] rosuvastatin (Crestor) tablet 20 mg, 20 mg, oral, Daily, RENARD Schwartz    Facility-Administered Medications Ordered in Other Encounters:     lactated Ringer's bolus, , intravenous, Continuous PRN, Alycia Julian, APRN-CRNA, New Bag at 10/23/23 1131  Prior to Admission medications    Medication Sig Start Date End Date Taking? Authorizing Provider   aspirin 81 mg EC tablet Take 1 tablet (81 mg) by mouth once daily. 12/16/16  Yes Historical Provider, MD   calcium carbonate 600 mg calcium (1,500 mg) tablet Take by mouth. 6/1/17  Yes Historical Provider, MD   cholecalciferol (Vitamin D-3) 50 MCG (2000 UT) tablet Take 1 tablet (2,000 Units) by mouth once daily. 6/1/17  Yes Historical Provider, MD   cyanocobalamin (Vitamin B-12) 500 mcg tablet Take 1 tablet (500 mcg) by mouth once daily. 3/9/12  Yes Historical Provider, MD   flaxseed oiL 1,000 mg capsule Take 1 capsule (1,000 mg) by mouth twice a day. 3/9/12  Yes Historical Provider, MD   folic acid (Folvite) 1 mg tablet Take by mouth twice a day.   Yes Historical Provider, MD   glucosamine sulfate 500 mg tablet Take 1,000 mg by mouth once daily. 3/9/12  Yes Historical Provider, MD    guaiFENesin (Mucinex) 600 mg 12 hr tablet Take 2 tablets (1,200 mg) by mouth once daily. Do not crush, chew, or split.   Yes Historical Provider, MD   levothyroxine (Synthroid, Levoxyl) 88 mcg tablet Take 1 tablet (88 mcg) by mouth once daily. 10/10/23 10/9/24 Yes Tamar De La Cruz MD   multivitamin tablet Take by mouth. 5/16/07  Yes Historical Provider, MD   naproxen (Naprosyn) 375 mg tablet Take 1 tablet (375 mg) by mouth 2 times a day. 6/2/22  Yes Historical Provider, MD   omeprazole (PriLOSEC) 20 mg DR capsule Take 1 capsule (20 mg) by mouth 2 times a day before meals. 10/10/23  Yes Tamar De La Cruz MD   polyethylene glycol (Glycolax, Miralax) 17 gram/dose powder Take 17 g by mouth once daily. Takes 1 TBSP daily   Yes Historical Provider, MD   rosuvastatin (Crestor) 20 mg tablet Take 1 tablet (20 mg) by mouth once daily. 3/28/23  Yes Historical Provider, MD   turmeric-turmeric root extract 450-50 mg capsule Take by mouth.   Yes Historical Provider, MD   vitamins A,C,E-zinc-copper 2,148 mcg-113 mg-45 mg-17.4mg tablet Take 1 tablet by mouth once daily with a meal. 12/3/14  Yes Historical Provider, MD   aspirin 81 mg EC tablet Take 1 tablet (81 mg) by mouth 2 times a day. Do not start before October 24, 2023. 10/24/23 11/23/23  KAMLESH Schwartz-CNP   calcium carbonate-vitamin D3 500 mg-5 mcg (200 unit) tablet Take by mouth once daily. Do not start before October 24, 2023. 10/24/23   KAMLESH Schwartz-CNP   docusate sodium (Colace) 100 mg capsule Take 1 capsule (100 mg) by mouth 2 times a day for 14 days. 10/23/23 11/6/23  KAMLESH Schwartz-ESCOBAR   oxyCODONE-acetaminophen (Percocet) 5-325 mg tablet Take 1 tablet by mouth every 4 hours if needed for severe pain (7 - 10) for up to 6 days. 10/23/23 10/29/23  Yuliet Hamilton, APRN-CNP     No Known Allergies  Social History     Tobacco Use    Smoking status: Former     Years: 20     Types: Cigarettes    Smokeless tobacco: Never   Substance Use Topics    Alcohol use:  "Yes     Comment: occasional         Chemistry    Lab Results   Component Value Date/Time     10/13/2023 1052    K 4.5 10/13/2023 1052     10/13/2023 1052    CO2 27 10/13/2023 1052    BUN 23 10/13/2023 1052    CREATININE 0.75 10/13/2023 1052    Lab Results   Component Value Date/Time    CALCIUM 9.6 10/13/2023 1052    ALKPHOS 63 10/13/2023 1052    AST 33 10/13/2023 1052    ALT 26 10/13/2023 1052    BILITOT 0.5 10/13/2023 1052          Lab Results   Component Value Date/Time    WBC 6.9 10/13/2023 1052    HGB 13.1 10/13/2023 1052    HCT 40.9 10/13/2023 1052     10/13/2023 1052     No results found for: \"PROTIME\", \"PTT\", \"INR\"  No results found for this or any previous visit (from the past 4464 hour(s)).  No results found for this or any previous visit from the past 1095 days.     Relevant Problems   Cardiovascular   (+) Atrial fibrillation (CMS/HCC)   (+) Chronic a-fib (CMS/HCC)   (+) Coronary atherosclerosis   (+) Hyperlipidemia   (+) PSVT (paroxysmal supraventricular tachycardia)   (+) Pure hypercholesterolemia      Endocrine   (+) Graves disease   (+) Hypothyroidism due to medicaments and other exogenous substances   (+) Hypothyroidism, postablative      GI   (+) GERD without esophagitis   (+) Rectal bleeding      Pulmonary   (+) Chronic obstructive pulmonary disease (CMS/HCC)   (+) Emphysema lung (CMS/HCC)      Musculoskeletal   (+) Primary osteoarthritis of left hip   (+) Primary osteoarthritis of right knee      Eyes, Ears, Nose, and Throat   (+) Hearing loss      Infectious Disease   (+) Ringworm of body   (+) Viral upper respiratory tract infection with cough       Clinical information reviewed:   Tobacco  Allergies  Meds  Problems  Med Hx  Surg Hx  OB Status    Fam Hx  Soc Hx        NPO Detail:  NPO/Void Status  Date of Last Liquid: 10/22/23  Time of Last Liquid: 2300  Date of Last Solid: 10/22/23  Time of Last Solid: 2300  Last Intake Type: Clear fluids  Time of Last Void: 0930     "     Physical Exam    Airway  Mallampati: I  TM distance: >3 FB  Neck ROM: full     Cardiovascular - normal exam  Rhythm: regular  Rate: normal     Dental    Pulmonary    Abdominal            Anesthesia Plan    ASA 3     spinal     The patient is not a current smoker.  Patient was not previously instructed to abstain from smoking on day of procedure.  Patient did not smoke on day of procedure.  Education provided regarding risk of obstructive sleep apnea.  intravenous induction   Anesthetic plan and risks discussed with patient.    Plan discussed with CRNA.

## 2023-10-23 NOTE — BRIEF OP NOTE
Date: 10/23/2023  OR Location: [facility] OR    Name: Laura Banuelos, : 1945, Age: 78 y.o., MRN: [id number], Sex: female    Diagnosis  * No Diagnosis Codes entered * * No Diagnosis Codes entered *     Procedures    * LEFT TOTAL HIP REPLACEMENT DIRECT ANTERIOR APPROACH    Surgeons      * Josh Mosqueda - Primary    Resident/Fellow/Other Assistant:  Surgeon(s) and Role:    Procedure Summary  Anesthesia: Spinal  ASA: III  Anesthesia Staff: Anesthesiologist: German Segura MD  CRNA: KAMLESH Gauthier-CRNA  Estimated Blood Loss: 250mL  Intra-op Medications: * Intraprocedure medication information is unavailable because the case start and end events have not been set *           Anesthesia Record               Intraprocedure I/O Totals          Intake    LR 1100.00 mL    Propofol Drip 0.00 mL    The total shown is the total volume documented since Anesthesia Start was filed.    Total Intake 1100 mL       Output    Est. Blood Loss 250 mL    Total Output 250 mL       Net    Net Volume 850 mL          Specimen:   ID Type Source Tests Collected by Time   1 : left femoral head Tissue FEMORAL HEAD LEFT SURGICAL PATHOLOGY EXAM Josh Mosqueda MD 10/23/2023 5410        Staff:   Relief Circulator: Gloria Rosenthal RN  Relief Scrub: Al Haynes  Scrub Person: Rayna Adkins RN          Findings: hip osteoarthritis    Complications:  None; patient tolerated the procedure well.     Disposition: PACU - hemodynamically stable.  Condition: stable  Specimens Collected:   ID Type Source Tests Collected by Time   1 : left femoral head Tissue FEMORAL HEAD LEFT SURGICAL PATHOLOGY EXAM Josh Mosqueda MD 10/23/2023 0043         Masoud Topete MD  Orthopedic Surgery PGY5

## 2023-10-24 VITALS
WEIGHT: 121.25 LBS | DIASTOLIC BLOOD PRESSURE: 69 MMHG | BODY MASS INDEX: 20.7 KG/M2 | HEART RATE: 60 BPM | SYSTOLIC BLOOD PRESSURE: 131 MMHG | HEIGHT: 64 IN | TEMPERATURE: 97.7 F | OXYGEN SATURATION: 94 % | RESPIRATION RATE: 18 BRPM

## 2023-10-24 PROBLEM — M16.12 PRIMARY OSTEOARTHRITIS OF LEFT HIP: Status: RESOLVED | Noted: 2023-05-01 | Resolved: 2023-10-24

## 2023-10-24 PROBLEM — M16.12 OSTEOARTHRITIS OF LEFT HIP, UNSPECIFIED OSTEOARTHRITIS TYPE: Status: ACTIVE | Noted: 2023-10-24

## 2023-10-24 LAB
ANION GAP SERPL CALC-SCNC: 12 MMOL/L (ref 10–20)
BUN SERPL-MCNC: 18 MG/DL (ref 6–23)
CALCIUM SERPL-MCNC: 8.4 MG/DL (ref 8.6–10.3)
CHLORIDE SERPL-SCNC: 106 MMOL/L (ref 98–107)
CO2 SERPL-SCNC: 25 MMOL/L (ref 21–32)
CREAT SERPL-MCNC: 0.76 MG/DL (ref 0.5–1.05)
ERYTHROCYTE [DISTWIDTH] IN BLOOD BY AUTOMATED COUNT: 13.7 % (ref 11.5–14.5)
GFR SERPL CREATININE-BSD FRML MDRD: 80 ML/MIN/1.73M*2
GLUCOSE SERPL-MCNC: 129 MG/DL (ref 74–99)
HCT VFR BLD AUTO: 34 % (ref 36–46)
HGB BLD-MCNC: 11.2 G/DL (ref 12–16)
MCH RBC QN AUTO: 31 PG (ref 26–34)
MCHC RBC AUTO-ENTMCNC: 32.9 G/DL (ref 32–36)
MCV RBC AUTO: 94 FL (ref 80–100)
NRBC BLD-RTO: 0 /100 WBCS (ref 0–0)
PLATELET # BLD AUTO: 149 X10*3/UL (ref 150–450)
PMV BLD AUTO: 10.4 FL (ref 7.5–11.5)
POTASSIUM SERPL-SCNC: 4.4 MMOL/L (ref 3.5–5.3)
RBC # BLD AUTO: 3.61 X10*6/UL (ref 4–5.2)
SODIUM SERPL-SCNC: 139 MMOL/L (ref 136–145)
WBC # BLD AUTO: 9.7 X10*3/UL (ref 4.4–11.3)

## 2023-10-24 PROCEDURE — 2500000001 HC RX 250 WO HCPCS SELF ADMINISTERED DRUGS (ALT 637 FOR MEDICARE OP): Performed by: NURSE PRACTITIONER

## 2023-10-24 PROCEDURE — 99238 HOSP IP/OBS DSCHRG MGMT 30/<: CPT | Performed by: NURSE PRACTITIONER

## 2023-10-24 PROCEDURE — G0378 HOSPITAL OBSERVATION PER HR: HCPCS

## 2023-10-24 PROCEDURE — 2500000004 HC RX 250 GENERAL PHARMACY W/ HCPCS (ALT 636 FOR OP/ED): Performed by: NURSE PRACTITIONER

## 2023-10-24 PROCEDURE — 85027 COMPLETE CBC AUTOMATED: CPT | Performed by: NURSE PRACTITIONER

## 2023-10-24 PROCEDURE — 97110 THERAPEUTIC EXERCISES: CPT | Mod: GP

## 2023-10-24 PROCEDURE — 36415 COLL VENOUS BLD VENIPUNCTURE: CPT | Performed by: NURSE PRACTITIONER

## 2023-10-24 PROCEDURE — 7100000011 HC EXTENDED STAY RECOVERY HOURLY - NURSING UNIT

## 2023-10-24 PROCEDURE — 97161 PT EVAL LOW COMPLEX 20 MIN: CPT | Mod: GP

## 2023-10-24 PROCEDURE — 80048 BASIC METABOLIC PNL TOTAL CA: CPT | Performed by: NURSE PRACTITIONER

## 2023-10-24 RX ADMIN — NAPROXEN 500 MG: 500 TABLET ORAL at 08:15

## 2023-10-24 RX ADMIN — OXYCODONE HYDROCHLORIDE AND ACETAMINOPHEN 0.5 TABLET: 5; 325 TABLET ORAL at 01:38

## 2023-10-24 RX ADMIN — CEFAZOLIN SODIUM 2 G: 2 INJECTION, SOLUTION INTRAVENOUS at 04:48

## 2023-10-24 RX ADMIN — Medication 21 PERCENT: at 09:24

## 2023-10-24 RX ADMIN — ASPIRIN 81 MG: 81 TABLET, COATED ORAL at 08:13

## 2023-10-24 ASSESSMENT — COGNITIVE AND FUNCTIONAL STATUS - GENERAL
MOBILITY SCORE: 18
TURNING FROM BACK TO SIDE WHILE IN FLAT BAD: A LITTLE
MOVING FROM LYING ON BACK TO SITTING ON SIDE OF FLAT BED WITH BEDRAILS: A LITTLE
STANDING UP FROM CHAIR USING ARMS: A LITTLE
CLIMB 3 TO 5 STEPS WITH RAILING: A LITTLE
WALKING IN HOSPITAL ROOM: A LITTLE
MOVING TO AND FROM BED TO CHAIR: A LITTLE

## 2023-10-24 ASSESSMENT — ACTIVITIES OF DAILY LIVING (ADL): LACK_OF_TRANSPORTATION: NO

## 2023-10-24 ASSESSMENT — PAIN - FUNCTIONAL ASSESSMENT
PAIN_FUNCTIONAL_ASSESSMENT: 0-10

## 2023-10-24 ASSESSMENT — PAIN SCALES - GENERAL
PAINLEVEL_OUTOF10: 4
PAINLEVEL_OUTOF10: 1
PAINLEVEL_OUTOF10: 0 - NO PAIN

## 2023-10-24 NOTE — PROGRESS NOTES
Physical Therapy    Physical Therapy Evaluation & Treatment    Patient Name: Laura Banuelos  MRN: [id number]  Today's Date: 10/24/2023   Time Calculation  Start Time: 0841  Stop Time: 0916  Time Calculation (min): 35 min    Assessment/Plan   PT Assessment  PT Assessment Results: Decreased range of motion, Decreased mobility  Rehab Prognosis: Excellent  Evaluation/Treatment Tolerance: Patient tolerated treatment well  Medical Staff Made Aware: Yes  End of Session Communication: Bedside nurse  End of Session Patient Position: Up in chair, Alarm off, not on at start of session     PT Plan  PT Eval Only Reason:  (Pt is discharging home)  PT - OK to Discharge: Yes (since Pt is discharging home)      Subjective     General Visit Information:  General  Reason for Referral:  (78 to female admutted 2' to L hip OA, s/p direct anterior approach L OLIVA)  Referred By:  (Dr. ECTOR Mosqueda)  Past Medical History Relevant to Rehab:  (hypothyroidism, A Fib, graves disease, OA)  Prior to Session Communication: Bedside nurse  Patient Position Received: Up in chair, Alarm off, not on at start of session  Preferred Learning Style: verbal, visual, written  General Comment:  (Pt cleared for PT by RN, Pt is pleasant and agreeable to work with PT. Pt performed very well. No further Pt needed in the hospital. Pt is discharging home today. Recommend LOW intensity follow up PT sevices)  Home Living:  Home Living  Type of Home: Condo  Lives With: Alone (Pt's daughter live next door to Pt)  Home Adaptive Equipment: Walker rolling or standard, Cane  Home Layout: One level  Home Access: Stairs to enter with rails  Entrance Stairs-Rails: Right  Entrance Stairs-Number of Steps: 1  Bathroom Shower/Tub: Walk-in shower  Bathroom Equipment: Grab bars in shower, Shower chair with back, Raised toilet seat without rails  Prior Level of Function:  Prior Function Per Pt/Caregiver Report  Level of [place]: Independent with ADLs and functional transfers,  Independent with homemaking with ambulation  Precautions:  Precautions  LE Weight Bearing Status: Weight Bearing as Tolerated (L LE)  Post-Surgical Precautions: Left hip precautions  Vital Signs:       Objective   Pain:  Pain Assessment  Pain Assessment: 0-10  Pain Score: 0 - No pain  Cognition:  Cognition  Overall Cognitive Status: Within Functional Limits (A+O x 4)    General Assessments:  General Observation  General Observation:  (Pt performed very well. Pt performed the folowinh supine exercises; B AP. L QS, glut sets, L heel slides, L hip abd and L SAQ each x 20 reps)             Activity Tolerance  Endurance: Endurance does not limit participation in activity              Static Sitting Balance  Static Sitting-Balance Support: No upper extremity supported  Static Sitting-Level of Assistance: Distant supervision    Static Standing Balance  Static Standing-Balance Support: Bilateral upper extremity supported (wheeled walker)  Static Standing-Level of Assistance: Close supervision  Dynamic Standing Balance  Dynamic Standing-Balance Support: Bilateral upper extremity supported (wheeled walker)  Dynamic Standing-Comments:  (close supervision)  Functional Assessments:  Bed Mobility  Bed Mobility: No    Transfers  Transfer: Yes (Pt sitting up in her chair)  Transfer 1  Transfer From 1: Chair with arms to  Transfer to 1:  (wheeled walker)  Technique 1: Sit to stand, Stand to sit  Transfer Level of Assistance 1: Close supervision    Ambulation/Gait Training  Ambulation/Gait Training Performed: Yes  Ambulation/Gait Training 1  Surface 1: Level tile  Device 1: Rolling walker  Assistance 1: Close supervision  Quality of Gait 1:  (decreased step length, decreased julieth)  Comments/Distance (ft) 1: 80 feet x 2    Stairs  Stairs: Yes  Stairs  Rails 1: Bilateral  Device 1: No device  Assistance 1: Close supervision  Comment/Number of Steps 1: 4  Extremity/Trunk Assessments:  ITALIA   RUJONA : Within Functional Limits  LUE   LUE:  Within Functional Limits  RLE   RLE : Within Functional Limits  LLE   LLE :  (L hip not tested, otherwise L LE is WFL)  Treatments:       Bed Mobility  Bed Mobility: No    Ambulation/Gait Training  Ambulation/Gait Training Performed: Yes  Ambulation/Gait Training 1  Surface 1: Level tile  Device 1: Rolling walker  Assistance 1: Close supervision  Quality of Gait 1:  (decreased step length, decreased julieth)  Comments/Distance (ft) 1: 80 feet x 2  Transfers  Transfer: Yes (Pt sitting up in her chair)  Transfer 1  Transfer From 1: Chair with arms to  Transfer to 1:  (wheeled walker)  Technique 1: Sit to stand, Stand to sit  Transfer Level of Assistance 1: Close supervision    Stairs  Stairs: Yes  Stairs  Rails 1: Bilateral  Device 1: No device  Assistance 1: Close supervision  Comment/Number of Steps 1: 4  Outcome Measures:  [facility] Basic Mobility  Turning from your back to your side while in a flat bed without using bedrails: A little  Moving from lying on your back to sitting on the side of a flat bed without using bedrails: A little  Moving to and from bed to chair (including a wheelchair): A little  Standing up from a chair using your arms (e.g. wheelchair or bedside chair): A little  To walk in hospital room: A little  Climbing 3-5 steps with railing: A little  Basic Mobility - Total Score: 18    Encounter Problems       Encounter Problems (Active)       Safety       LTG - Patient will adhere to hip precautions during ADL's and transfers       Start:  10/23/23                   Education Documentation  No documentation found.  Education Comments  No comments found.

## 2023-10-24 NOTE — ANESTHESIA POSTPROCEDURE EVALUATION
Patient: Laura Banuelos    Procedure Summary       Date: 10/23/23 Room / Location: [facility] OR 03 / [facility] OR    Anesthesia Start: 1312 Anesthesia Stop: 1529    Procedure: LEFT TOTAL HIP REPLACEMENT DIRECT ANTERIOR APPROACH (Left: Hip) Diagnosis:     Surgeons: Josh Mosqueda MD Responsible Provider: German Segura MD    Anesthesia Type: spinal ASA Status: 3            Anesthesia Type: spinal    Vitals Value Taken Time   /65 10/23/23 1723   Temp 36.5 °C (97.7 °F) 10/23/23 1523   Pulse 61 10/23/23 1723   Resp 16 10/23/23 1723   SpO2 96 % 10/23/23 1723       Anesthesia Post Evaluation    Patient location during evaluation: PACU  Patient participation: complete - patient participated  Level of consciousness: awake  Pain management: adequate  Multimodal analgesia pain management approach  Airway patency: patent  Two or more strategies used to mitigate risk of obstructive sleep apnea  Cardiovascular status: acceptable  Respiratory status: acceptable  Hydration status: acceptable        No notable events documented.

## 2023-10-24 NOTE — CARE PLAN
Problem: Fall/Injury  Goal: Not fall by end of shift  Outcome: Progressing  Goal: Be free from injury by end of the shift  Outcome: Progressing     Problem: Pain  Goal: Turns in bed with improved pain control throughout the shift  Outcome: Progressing  Goal: Walks with improved pain control throughout the shift  Outcome: Progressing     Problem: Safety  Goal: Patient will be injury free during hospitalization  Outcome: Progressing     Problem: Daily Care  Goal: Daily care needs are met  Outcome: Progressing   The patient's goals for the shift include      The clinical goals for the shift include walk with walker

## 2023-10-24 NOTE — PROGRESS NOTES
10/24/23 0939   Discharge Planning   Living Arrangements Alone   Support Systems Children   Assistance Needed X3, independent with walker, room air baseline   Type of Residence Private residence   Number of Stairs to Enter Residence 1   Number of Stairs Within Residence 2   Do you have animals or pets at home? Yes   Type of Animals or Pets 2 cats   Who is requesting discharge planning? Provider   Home or Post Acute Services In home services   Type of Home Care Services Home PT   Patient expects to be discharged to: Home with [facility] (PT)   Does the patient need discharge transport arranged? No   Financial Resource Strain   How hard is it for you to pay for the very basics like food, housing, medical care, and heating? Not hard   Housing Stability   In the last 12 months, was there a time when you were not able to pay the mortgage or rent on time? N   In the last 12 months, how many places have you lived? 1   In the last 12 months, was there a time when you did not have a steady place to sleep or slept in a shelter (including now)? N   Transportation Needs   In the past 12 months, has lack of transportation kept you from medical appointments or from getting medications? no   In the past 12 months, has lack of transportation kept you from meetings, work, or from getting things needed for daily living? No   Patient Choice   Provider Choice list and CMS website (https://medicare.gov/care-compare#search) for post-acute Quality and Resource Measure Data were provided and reviewed with: Patient   Patient / Family choosing to utilize agency / facility established prior to hospitalization Yes

## 2023-10-24 NOTE — DISCHARGE SUMMARY
Discharge Diagnosis  Primary osteoarthritis of left hip    Issues Requiring Follow-Up  Post-operative left total hip arthroplasty     Test Results Pending At Discharge  Pending Labs       Order Current Status    Surgical Pathology Exam Collected (10/23/23 5489)            Hospital Course   Patient is a 78 year old female who is POD 1 from left total hip arthroplasty.  There were no intra-operative complications.  She was discharged to PACU in stable condition.  She was then observed overnight on the floor.  She states her pain is well controlled, denies paresthesia pain.  She has been ambulatory to bathroom with walker.  She is tolerating a regular diet.  Denies breathing difficulty.      Pertinent Physical Exam At Time of Discharge  Physical Exam  Vitals reviewed.   HENT:      Head: Normocephalic and atraumatic.   Eyes:      Extraocular Movements: Extraocular movements intact.      Conjunctiva/sclera: Conjunctivae normal.      Pupils: Pupils are equal, round, and reactive to light.   Cardiovascular:      Rate and Rhythm: Normal rate.      Pulses: Normal pulses.   Pulmonary:      Effort: Pulmonary effort is normal.   Abdominal:      Palpations: Abdomen is soft.   Musculoskeletal:      Comments: Left hip dressing C/D/I, surrounding tissues soft and compressible.  No surrounding erythema.  Motion and sensations intact LLE.  2+ DP/PT and capillary refill < 2 LLE   Skin:     General: Skin is warm and dry.      Capillary Refill: Capillary refill takes less than 2 seconds.   Neurological:      General: No focal deficit present.      Mental Status: She is alert and oriented to person, place, and time.   Psychiatric:         Mood and Affect: Mood normal.         Behavior: Behavior normal.         Home Medications     Medication List      START taking these medications     calcium carbonate-vitamin D3 500 mg-5 mcg (200 unit) tablet; Take by   mouth once daily. Do not start before October 24, 2023.; Replaces: calcium    carbonate 600 mg calcium (1,500 mg) tablet   docusate sodium 100 mg capsule; Commonly known as: Colace; Take 1   capsule (100 mg) by mouth 2 times a day for 14 days.   oxyCODONE-acetaminophen 5-325 mg tablet; Commonly known as: Percocet;   Take 1 tablet by mouth every 4 hours if needed for severe pain (7 - 10)   for up to 6 days.     CHANGE how you take these medications     aspirin 81 mg EC tablet; Take 1 tablet (81 mg) by mouth 2 times a day.   Do not start before October 24, 2023.; What changed: when to take this     CONTINUE taking these medications     cholecalciferol 50 MCG (2000 UT) tablet; Commonly known as: Vitamin D-3   cyanocobalamin 500 mcg tablet; Commonly known as: Vitamin B-12   flaxseed oiL 1,000 mg capsule   folic acid 1 mg tablet; Commonly known as: Folvite   glucosamine sulfate 500 mg tablet   levothyroxine 88 mcg tablet; Commonly known as: Synthroid, Levoxyl; Take   1 tablet (88 mcg) by mouth once daily.   multivitamin tablet   naproxen 375 mg tablet; Commonly known as: Naprosyn   omeprazole 20 mg DR capsule; Commonly known as: PriLOSEC; Take 1 capsule   (20 mg) by mouth 2 times a day before meals.   polyethylene glycol 17 gram/dose powder; Commonly known as: Glycolax,   Miralax   rosuvastatin 20 mg tablet; Commonly known as: Crestor   turmeric-turmeric root extract 450-50 mg capsule   vitamins A,C,E-zinc-copper 2,148 mcg-113 mg-45 mg-17.4mg tablet     STOP taking these medications     calcium carbonate 600 mg calcium (1,500 mg) tablet; Replaced by: calcium   carbonate-vitamin D3 500 mg-5 mcg (200 unit) tablet   guaiFENesin 600 mg 12 hr tablet; Commonly known as: Mucinex       Outpatient Follow-Up  Future Appointments   Date Time Provider Department Center   10/25/2023  9:00 AM Kaushal Arriaza PT [facility] [place]   11/7/2023  9:45 AM MD Alphonse [id number] [facility]       Yuliet Hamilton, APRN-CNP

## 2023-10-24 NOTE — DISCHARGE INSTRUCTIONS
TOTAL HIP REPLACEMENT DISCHARGE INSTRUCTIONS    Josh Mosqueda MD      DRIVING & TRAVEL AFTER SURGERY   Patients should anticipate waiting at least 4-6 weeks before traveling long distances after surgery.  You will need to stop to walk around ever 1 hour during your travel to help with blood clot prevention.  Please call the office or your joint nurse to discuss prior to post-surgical travel.  Patients may not drive until cleared by the joint nurse or the office.    DENTAL PROCEDURES & CLEANINGS  You must wait a minimum of 3 months for elective dental appointments, including routine cleanings or dental work including bridges, crowns, extractions, etc..  For any dental visit - cleaning or dental procedures - patients must take an antibiotic 1 hour before the appointment.  Antibiotics are a lifelong need before dental appointments.  The antibiotic prescribed will be based on each patient's allergies.    WOUND CARE  If you experience continued drainage or bleeding, you may cover with abdominal pads (purchase at local drug store).  Knee replacements should wrap with an ace wrap.  Do not remove surgical dressing, it will be removed when you arrive in clinic for follow up visit.   Mesh dressing under the bandage will remain in place until it peels off on its own.  If it is loose, you may gently remove.  Do not remove if pulling causes resistance against the incision.      PAIN, SWELLING, BRUISING & CLICKING  Pain and swelling are a natural part of your recovery which is considered normal fur up to a year after surgery.  Symptoms may be treated with movement, ice, compression stockings, elevating your leg, and by following the pain medication regimen as prescribed.  Bruising is normal for several weeks after surgery and will run down the leg over time.  You may ice areas that are tender to help with discomfort.  You are required to wear the provided compression stockings, every day, for 4 weeks following surgery.  Remove  the stockings at night and place them back on in the morning.  Pain and swelling may temporarily increase with an increase in activity or exercise.  Use ice after activity.  Audible clicking with movement or exercises is considered normal following joint replacement.  You may also feel decreased sensation or numbness near the incision site.  These are usually normal and may or may not fade over time.     PERSONAL HYGIENE  You may shower upon discharging from the hospital.  Soap and water is permitted to run over the surgical dressing, steri-strips and incision.  Do not scrub directly over these items.  DO NOT soak your incision in a bath, hot tub, pool or pond/lake for a minimum of 8 weeks following your surgery.  DO NOT use lotions, creams, ointments on your wound for a minimum of 6 weeks following your surgery. At that time you may use vitamin E to assist with softening of your incision.      RESTARTING HOME ROUTINE - DIET & MEDICATIONS  Post-operative constipation can result due to a combination of inactivity, anesthesia and pain medication. To help prevent this, you should increase your water and fiber intake. Physical activity such as walking will also help stimulate the bowels.   You may resume your normal diet when you discharge home.  Choose foods that help promote good bowel habits and prevent constipation, such as foods high in fiber.  You may restart your home medications the following day after your surgery UNLESS you have been given alternate instructions.  Follow the instructions given to you on your hospital discharge instructions for more information regarding your home medications.      IN-HOME PHYSICAL THERAPY & OUTPATIENT PHYSICAL THERAPY  In-home physical therapy will start 1-2 days after you get home from the hospital.    The home care agency will call within the first 24-48 hours to set up their first visit.  Please do not call your care team to inquire during this timeframe.  Continue the  exercises you were given in the hospital until you have been seen by in-home therapy.  Make sure to provide a phone number with the ability for the home care staff to leave a message if you do not answer your phone.    Outpatient physical therapy following knee replacement surgery should begin 2-3 weeks after surgery.  You should call to schedule this appointment ASAP if not already scheduled before surgery.  Waiting until you are ready for outpatient physical therapy will cause a delay in your care.  You may choose any outpatient physical therapy location.  Call the office for an order if needed.    EMERGENCIES - WHEN TO CONTACT THE SURGEON'S OFFICE IMMEDIATELY  Fever >101 with chills that has been present for at least 48 hours.   Excessive bleeding from incision that will not slow down. A small amount of drainage is normal and expected.  Once pressure is applied and the area is covered, do not continue to check the area regularly.  This will remove pressure and bleeding will continue.  Leave in place for 4-6 hours.  Signs of infection of incision-excessive drainage that is soaking through your dressing (especially if it is pus-like), redness that is spreading out from the edges of your incision, or increased warmth around the area.  Excruciating pain for which the pain medication, taken as instructed, is not helping.  Severe calf pain.  Go directly to the emergency room or call 911, if you are experiencing chest pain or difficulty breathing.    ICE & COLD THERAPY INSTRUCTIONS    To assist with pain control and post-op swelling, you should be using ice regularly throughout recovery, especially for the first 6 weeks, regardless of the cold therapy method you use.      Always make sure there is a layer of protection between the cold pad and your skin.    If you are using ICE PACKS or GEL PACKS, you will need to alternate 20 minutes on, 20 minutes off twice per hour.    If you are using an ICE MACHINE, please follow  the provided ice machine instructions.  These devices differ from ice or ice packs whereas the mechanism circulates water through tubing and a pad to provide longer periods of cold therapy to the desired site.  You can use your cold devices around the clock for optimal comfort.  We recommend using cold therapy after working with therapy or completing exercises on your own.  There is no set schedule in which you must follow while using cold therapy.  Below are a few points to remember when using a cold therapy device:    You do not need to need to use the 20 on, 20 off method.  Detach the pad from the cooler and ambulate at least once every hour.  You can check your skin under the pad at this time.  You may wear the cold therapy device during periods of sleep including overnight.  If you wake up during the night, you can check the skin at this time.  You do not need to wake up specifically to perform skin checks.  Empty the cooler and pad when device is not in use.  Follow 's instructions for cleaning your cold therapy device.      DISCHARGE MEDICATIONS    PAIN MEDICATION    _X___ Oxycodone-acetaminophen  Oxycodone-acetaminophen has been prescribed for post-operative pain control.    These medications may only be refilled if neededONCE every 7 days for a period of up to 6 weeks following surgery.  After 6 weeks, you will transition to acetaminophen and over -the- counter anti-inflammatories such as Ibuprofen, Advil or Aleve in conjunction with ICE/COLD THERAPY.   Side effects may be constipation and nausea, vomiting, sleepiness, dizziness, lightheadedness, headache, blurred vision, dry mouth sweating, itching (if you have itching, over-the -counter Benadryl can be used as needed).  You may NOT operate a motor vehicle while taking these medications or have been cleared by your care team.    Please call the office for a refill request.  The office staff and orthopedic nurses cannot refill medications;  messages should be left directly through the office.  Please do not call multiple times or call other members of the care team for medication needs, this will cause the refill to take longer.    Per State and Institutional policy, pain medications can only be refilled every 7 days for up to six weeks following surgery.   .    __X_____ Naproxen has been prescribed as an adjunct anti-inflammatory to assist in pain control.    Take one 500 mg tablet twice a day for 4 weeks.  You will not receive refills on this medication.   Side effects may include nausea.  May not be prescribed if you are on a more potent blood thinner than aspirin or have chronic kidney disease.    BLOOD THINNER    _X___ Blood Thinner   A blood thinner has been prescribed to prevent blood clots in your leg or lungs. Take as prescribed on the bottle for 4 weeks. You will not receive a refill on this medication.      STOOL SOFTENERS    _X___ Colace (Docusate Sodium)   Take this medication to help with constipation while using the Oxycodone for pain control.  You will not receive a refill on this medication.    Antibiotics    ____ Cefadroxil or doxycycline  May be prescribed if needed for prophylaxis against infection   Take 7 day course of antibiotics until they are all gone  May not be prescribed if you do not meet criteria for elevated infection risk after surgery          You will not receive refills on the following medications:    Naproxen  Colace  Blood Thinner

## 2023-10-25 ENCOUNTER — HOME CARE VISIT (OUTPATIENT)
Dept: HOME HEALTH SERVICES | Facility: HOME HEALTH | Age: 78
End: 2023-10-25
Payer: MEDICARE

## 2023-10-25 PROCEDURE — G0151 HHCP-SERV OF PT,EA 15 MIN: HCPCS | Mod: HHH

## 2023-10-25 PROCEDURE — 0023 HH SOC

## 2023-10-25 PROCEDURE — 169592 NO-PAY CLAIM PROCEDURE

## 2023-10-25 PROCEDURE — 1090000001 HH PPS REVENUE CREDIT

## 2023-10-25 PROCEDURE — 1090000002 HH PPS REVENUE DEBIT

## 2023-10-25 SDOH — HEALTH STABILITY: PHYSICAL HEALTH: EXERCISE COMMENTS: TOTAL HIP EXERCISES TO BE TAUGHT DURING TODAY'S VISIT.

## 2023-10-25 ASSESSMENT — ENCOUNTER SYMPTOMS
PAIN LOCATION - PAIN FREQUENCY: INTERMITTENT
SUBJECTIVE PAIN PROGRESSION: GRADUALLY IMPROVING
PAIN LOCATION: LEFT HIP
PAIN: 1
PAIN SEVERITY GOAL: 0/10
PAIN LOCATION - PAIN QUALITY: ACHING
PAIN LOCATION - EXACERBATING FACTORS: UPRIGHT ACTIVITY
PAIN LOCATION - PAIN DURATION: MINUTES
HIGHEST PAIN SEVERITY IN PAST 24 HOURS: 4/10
PAIN LOCATION - RELIEVING FACTORS: REST
LIMITED RANGE OF MOTION: 1
MUSCLE WEAKNESS: 1
PAIN LOCATION - PAIN SEVERITY: 0/10
LOWEST PAIN SEVERITY IN PAST 24 HOURS: 0/10

## 2023-10-25 ASSESSMENT — ACTIVITIES OF DAILY LIVING (ADL)
PHYSICAL TRANSFERS ASSESSED: 1
ENTERING_EXITING_HOME: MINIMUM ASSIST
CURRENT_FUNCTION: SUPERVISION
CURRENT_FUNCTION: MINIMUM ASSIST

## 2023-10-25 NOTE — HOME HEALTH
L paul anterior approach 10 23 23.  Josh Mosqueda M.D.  RTC 11 7 23.  Recalled two out of 4 total hip precautions upon my request.

## 2023-10-26 PROCEDURE — 1090000002 HH PPS REVENUE DEBIT

## 2023-10-26 PROCEDURE — 1090000001 HH PPS REVENUE CREDIT

## 2023-10-27 ENCOUNTER — HOME CARE VISIT (OUTPATIENT)
Dept: HOME HEALTH SERVICES | Facility: HOME HEALTH | Age: 78
End: 2023-10-27
Payer: MEDICARE

## 2023-10-27 VITALS
DIASTOLIC BLOOD PRESSURE: 74 MMHG | SYSTOLIC BLOOD PRESSURE: 122 MMHG | RESPIRATION RATE: 18 BRPM | HEART RATE: 74 BPM | TEMPERATURE: 96.2 F

## 2023-10-27 PROCEDURE — 1090000001 HH PPS REVENUE CREDIT

## 2023-10-27 PROCEDURE — 1090000002 HH PPS REVENUE DEBIT

## 2023-10-27 PROCEDURE — G0157 HHC PT ASSISTANT EA 15: HCPCS | Mod: HHH

## 2023-10-27 ASSESSMENT — ENCOUNTER SYMPTOMS
PAIN LOCATION - PAIN FREQUENCY: INTERMITTENT
PAIN LOCATION - PAIN QUALITY: PRESSURE
PAIN: 1
PAIN LOCATION - PAIN DURATION: INTERMITTENT
PAIN LOCATION: LEFT HIP
PAIN LOCATION - RELIEVING FACTORS: REST, MEDICATION
PAIN LOCATION - EXACERBATING FACTORS: ACTIVITY
PERSON REPORTING PAIN: PATIENT
PAIN LOCATION - PAIN SEVERITY: 2/10

## 2023-10-28 PROCEDURE — 1090000001 HH PPS REVENUE CREDIT

## 2023-10-28 PROCEDURE — 1090000002 HH PPS REVENUE DEBIT

## 2023-10-29 PROCEDURE — 1090000002 HH PPS REVENUE DEBIT

## 2023-10-29 PROCEDURE — 1090000001 HH PPS REVENUE CREDIT

## 2023-10-30 PROCEDURE — 1090000001 HH PPS REVENUE CREDIT

## 2023-10-30 PROCEDURE — 1090000002 HH PPS REVENUE DEBIT

## 2023-10-31 ENCOUNTER — TELEPHONE (OUTPATIENT)
Dept: INPATIENT UNIT | Facility: HOSPITAL | Age: 78
End: 2023-10-31
Payer: MEDICARE

## 2023-10-31 PROCEDURE — 1090000001 HH PPS REVENUE CREDIT

## 2023-10-31 PROCEDURE — 1090000002 HH PPS REVENUE DEBIT

## 2023-11-01 ENCOUNTER — HOME CARE VISIT (OUTPATIENT)
Dept: HOME HEALTH SERVICES | Facility: HOME HEALTH | Age: 78
End: 2023-11-01
Payer: MEDICARE

## 2023-11-01 VITALS
HEART RATE: 71 BPM | TEMPERATURE: 96.9 F | SYSTOLIC BLOOD PRESSURE: 118 MMHG | DIASTOLIC BLOOD PRESSURE: 68 MMHG | OXYGEN SATURATION: 97 % | RESPIRATION RATE: 18 BRPM

## 2023-11-01 PROCEDURE — G0157 HHC PT ASSISTANT EA 15: HCPCS | Mod: HHH

## 2023-11-01 PROCEDURE — 1090000002 HH PPS REVENUE DEBIT

## 2023-11-01 PROCEDURE — 1090000001 HH PPS REVENUE CREDIT

## 2023-11-01 ASSESSMENT — ENCOUNTER SYMPTOMS
PAIN SEVERITY GOAL: 0/10
PAIN LOCATION - PAIN FREQUENCY: INTERMITTENT
LOWEST PAIN SEVERITY IN PAST 24 HOURS: 0/10
PAIN LOCATION - EXACERBATING FACTORS: ACTIVITY
PAIN LOCATION: LEFT HIP
PAIN LOCATION - PAIN QUALITY: PRESSURE
SUBJECTIVE PAIN PROGRESSION: GRADUALLY IMPROVING
PERSON REPORTING PAIN: PATIENT
PAIN LOCATION - PAIN DURATION: INTERMITTENT
PAIN: 1
PAIN LOCATION - RELIEVING FACTORS: REST, ICE
HIGHEST PAIN SEVERITY IN PAST 24 HOURS: 2/10
PAIN LOCATION - PAIN SEVERITY: 1/10

## 2023-11-02 PROCEDURE — 1090000001 HH PPS REVENUE CREDIT

## 2023-11-02 PROCEDURE — 1090000002 HH PPS REVENUE DEBIT

## 2023-11-03 ENCOUNTER — HOME CARE VISIT (OUTPATIENT)
Dept: HOME HEALTH SERVICES | Facility: HOME HEALTH | Age: 78
End: 2023-11-03
Payer: MEDICARE

## 2023-11-03 PROCEDURE — 1090000001 HH PPS REVENUE CREDIT

## 2023-11-03 PROCEDURE — 1090000002 HH PPS REVENUE DEBIT

## 2023-11-04 PROCEDURE — 1090000002 HH PPS REVENUE DEBIT

## 2023-11-04 PROCEDURE — 1090000001 HH PPS REVENUE CREDIT

## 2023-11-05 PROCEDURE — 1090000001 HH PPS REVENUE CREDIT

## 2023-11-05 PROCEDURE — 1090000002 HH PPS REVENUE DEBIT

## 2023-11-06 ENCOUNTER — HOME CARE VISIT (OUTPATIENT)
Dept: HOME HEALTH SERVICES | Facility: HOME HEALTH | Age: 78
End: 2023-11-06
Payer: MEDICARE

## 2023-11-06 PROCEDURE — 1090000001 HH PPS REVENUE CREDIT

## 2023-11-06 PROCEDURE — 1090000002 HH PPS REVENUE DEBIT

## 2023-11-07 ENCOUNTER — HOSPITAL ENCOUNTER (OUTPATIENT)
Dept: RADIOLOGY | Facility: HOSPITAL | Age: 78
Discharge: HOME | End: 2023-11-07
Payer: MEDICARE

## 2023-11-07 ENCOUNTER — OFFICE VISIT (OUTPATIENT)
Dept: ORTHOPEDIC SURGERY | Facility: CLINIC | Age: 78
End: 2023-11-07
Payer: MEDICARE

## 2023-11-07 DIAGNOSIS — M25.552 LEFT HIP PAIN: ICD-10-CM

## 2023-11-07 DIAGNOSIS — M25.552 LEFT HIP PAIN: Primary | ICD-10-CM

## 2023-11-07 PROCEDURE — 99024 POSTOP FOLLOW-UP VISIT: CPT | Performed by: ORTHOPAEDIC SURGERY

## 2023-11-07 PROCEDURE — 1036F TOBACCO NON-USER: CPT | Performed by: ORTHOPAEDIC SURGERY

## 2023-11-07 PROCEDURE — 73502 X-RAY EXAM HIP UNI 2-3 VIEWS: CPT | Mod: LEFT SIDE | Performed by: RADIOLOGY

## 2023-11-07 PROCEDURE — 1090000002 HH PPS REVENUE DEBIT

## 2023-11-07 PROCEDURE — 1159F MED LIST DOCD IN RCRD: CPT | Performed by: ORTHOPAEDIC SURGERY

## 2023-11-07 PROCEDURE — 73502 X-RAY EXAM HIP UNI 2-3 VIEWS: CPT | Mod: LT,FY

## 2023-11-07 PROCEDURE — 1126F AMNT PAIN NOTED NONE PRSNT: CPT | Performed by: ORTHOPAEDIC SURGERY

## 2023-11-07 PROCEDURE — 1090000001 HH PPS REVENUE CREDIT

## 2023-11-07 NOTE — PROGRESS NOTES
This is a 78 y.o. female who is 2 weeks out from left total hip.  Pain is under control with current medications.  No drainage from her incision no fevers or chills.  Progressing well with physical therapy and appropriately improving in function.  No other new issues or symptoms.    Left hip examination: Surgical incision well approximated no erythema no drainage.  no pain with range of motion neurovascular tact distally    X-rays of the hip were reviewed independently interpreted by me today, the show stable total hip arthroplasty no fracture dislocation or loosening    Impression plan: 78 y.o. female 2 weeks out from left total hip.  We discussed continuing physical therapy and home exercise program. Pain medications to be used as needed. Assistive devices as needed per PT. We discussed DVT prophylaxis until 6 weeks. No dentist until 3 months and thereafter dental prophylaxis for life. Activity as tolerated weightbearing as tolerated, hip precautions until 3 months. I have personally reviewed the OARRS report for the patient. This report is scanned into the electronic medical record. I have considered the risks of abuse, dependence, addiction and diversion. Follow up 4 weeks with xrays

## 2023-11-08 ENCOUNTER — HOME CARE VISIT (OUTPATIENT)
Dept: HOME HEALTH SERVICES | Facility: HOME HEALTH | Age: 78
End: 2023-11-08
Payer: MEDICARE

## 2023-11-08 PROCEDURE — G0151 HHCP-SERV OF PT,EA 15 MIN: HCPCS | Mod: HHH

## 2023-11-08 PROCEDURE — 1090000001 HH PPS REVENUE CREDIT

## 2023-11-08 PROCEDURE — 1090000002 HH PPS REVENUE DEBIT

## 2023-11-08 PROCEDURE — G0180 MD CERTIFICATION HHA PATIENT: HCPCS

## 2023-11-08 SDOH — HEALTH STABILITY: PHYSICAL HEALTH: EXERCISE COMMENTS: FULL REVIEW OF SEATED, SUPINE AND STANDING EXERCISES.

## 2023-11-08 ASSESSMENT — ENCOUNTER SYMPTOMS
PAIN LOCATION - PAIN SEVERITY: 1/10
PAIN LOCATION - RELIEVING FACTORS: ACTIVITIY
LOWEST PAIN SEVERITY IN PAST 24 HOURS: 0/10
PAIN LOCATION - PAIN FREQUENCY: INTERMITTENT
PERSON REPORTING PAIN: PATIENT
PAIN LOCATION: LEFT HIP
HIGHEST PAIN SEVERITY IN PAST 24 HOURS: 3/10
PAIN LOCATION - PAIN DURATION: HOURS
PAIN SEVERITY GOAL: 0/10
SUBJECTIVE PAIN PROGRESSION: RAPIDLY IMPROVING
PAIN LOCATION - PAIN QUALITY: ACHING
PAIN: 1

## 2023-11-08 ASSESSMENT — ACTIVITIES OF DAILY LIVING (ADL)
AMBULATION ASSISTANCE ON FLAT SURFACES: 1
OASIS_M1830: 05
HOME_HEALTH_OASIS: 00
OASIS_M1830: 00
AMBULATION_DISTANCE/DURATION_TOLERATED: 2000

## 2023-11-09 PROCEDURE — 1090000001 HH PPS REVENUE CREDIT

## 2023-11-09 PROCEDURE — 1090000002 HH PPS REVENUE DEBIT

## 2023-11-10 LAB
LABORATORY COMMENT REPORT: NORMAL
PATH REPORT.FINAL DX SPEC: NORMAL
PATH REPORT.GROSS SPEC: NORMAL
PATH REPORT.TOTAL CANCER: NORMAL

## 2023-11-10 PROCEDURE — 1090000002 HH PPS REVENUE DEBIT

## 2023-11-10 PROCEDURE — 1090000001 HH PPS REVENUE CREDIT

## 2023-11-11 PROCEDURE — 1090000002 HH PPS REVENUE DEBIT

## 2023-11-11 PROCEDURE — 1090000001 HH PPS REVENUE CREDIT

## 2023-11-12 PROCEDURE — 1090000001 HH PPS REVENUE CREDIT

## 2023-11-12 PROCEDURE — 1090000002 HH PPS REVENUE DEBIT

## 2023-11-13 PROCEDURE — 1090000001 HH PPS REVENUE CREDIT

## 2023-11-13 PROCEDURE — 1090000002 HH PPS REVENUE DEBIT

## 2023-11-14 PROCEDURE — 1090000001 HH PPS REVENUE CREDIT

## 2023-11-14 PROCEDURE — 1090000002 HH PPS REVENUE DEBIT

## 2023-11-15 PROCEDURE — 1090000002 HH PPS REVENUE DEBIT

## 2023-11-15 PROCEDURE — 1090000001 HH PPS REVENUE CREDIT

## 2023-11-16 PROCEDURE — 1090000001 HH PPS REVENUE CREDIT

## 2023-11-16 PROCEDURE — 1090000002 HH PPS REVENUE DEBIT

## 2023-11-17 PROCEDURE — 1090000002 HH PPS REVENUE DEBIT

## 2023-11-17 PROCEDURE — 1090000001 HH PPS REVENUE CREDIT

## 2023-11-18 PROCEDURE — 1090000001 HH PPS REVENUE CREDIT

## 2023-11-18 PROCEDURE — 1090000002 HH PPS REVENUE DEBIT

## 2023-11-19 PROCEDURE — 1090000002 HH PPS REVENUE DEBIT

## 2023-11-19 PROCEDURE — 1090000001 HH PPS REVENUE CREDIT

## 2023-11-20 PROCEDURE — 1090000001 HH PPS REVENUE CREDIT

## 2023-11-20 PROCEDURE — 1090000002 HH PPS REVENUE DEBIT

## 2023-11-21 ENCOUNTER — APPOINTMENT (OUTPATIENT)
Dept: PHYSICAL THERAPY | Facility: CLINIC | Age: 78
End: 2023-11-21
Payer: MEDICARE

## 2023-11-21 ENCOUNTER — TELEPHONE (OUTPATIENT)
Dept: ORTHOPEDIC SURGERY | Facility: CLINIC | Age: 78
End: 2023-11-21
Payer: MEDICARE

## 2023-11-21 PROCEDURE — 1090000001 HH PPS REVENUE CREDIT

## 2023-11-21 PROCEDURE — 1090000002 HH PPS REVENUE DEBIT

## 2023-11-21 NOTE — TELEPHONE ENCOUNTER
10/23/23 lt paul  Patient has all the sudden started having pain on her inner thigh. Thinks she may be overdoing it, and noticed it when getting in/out of the car. Offered patient an appointment with Shila tomorrow but she declined. Wanted to just make sure not a big deal? She seems to think this is muscular.

## 2023-11-22 PROCEDURE — 1090000002 HH PPS REVENUE DEBIT

## 2023-11-22 PROCEDURE — 1090000001 HH PPS REVENUE CREDIT

## 2023-11-23 PROCEDURE — 1090000001 HH PPS REVENUE CREDIT

## 2023-11-23 PROCEDURE — 1090000002 HH PPS REVENUE DEBIT

## 2023-11-28 ENCOUNTER — HOSPITAL ENCOUNTER (OUTPATIENT)
Dept: RADIOLOGY | Facility: HOSPITAL | Age: 78
Discharge: HOME | End: 2023-11-28
Payer: MEDICARE

## 2023-11-28 ENCOUNTER — OFFICE VISIT (OUTPATIENT)
Dept: ORTHOPEDIC SURGERY | Facility: CLINIC | Age: 78
End: 2023-11-28
Payer: MEDICARE

## 2023-11-28 ENCOUNTER — APPOINTMENT (OUTPATIENT)
Dept: AUDIOLOGY | Facility: CLINIC | Age: 78
End: 2023-11-28
Payer: MEDICARE

## 2023-11-28 DIAGNOSIS — M25.552 LEFT HIP PAIN: ICD-10-CM

## 2023-11-28 DIAGNOSIS — M25.552 LEFT HIP PAIN: Primary | ICD-10-CM

## 2023-11-28 PROCEDURE — 73502 X-RAY EXAM HIP UNI 2-3 VIEWS: CPT | Mod: LEFT SIDE | Performed by: RADIOLOGY

## 2023-11-28 PROCEDURE — 1159F MED LIST DOCD IN RCRD: CPT | Performed by: ORTHOPAEDIC SURGERY

## 2023-11-28 PROCEDURE — 1126F AMNT PAIN NOTED NONE PRSNT: CPT | Performed by: ORTHOPAEDIC SURGERY

## 2023-11-28 PROCEDURE — 73502 X-RAY EXAM HIP UNI 2-3 VIEWS: CPT | Mod: LT,FY

## 2023-11-28 PROCEDURE — 1160F RVW MEDS BY RX/DR IN RCRD: CPT | Performed by: ORTHOPAEDIC SURGERY

## 2023-11-28 PROCEDURE — 99024 POSTOP FOLLOW-UP VISIT: CPT | Performed by: ORTHOPAEDIC SURGERY

## 2023-11-28 PROCEDURE — 1036F TOBACCO NON-USER: CPT | Performed by: ORTHOPAEDIC SURGERY

## 2023-11-28 NOTE — PROGRESS NOTES
Chief Complaint   Patient presents with    Left Hip - Follow-up     10/23/23 LT OLIVA         This is a 78 y.o. female who is 6 weeks out from left left total hip.  Pain is under control with current medications.  No drainage from her incision no fevers or chills.  Progressing well with physical therapy and appropriately improving in function.  No other new issues or symptoms.  She feels that 2 weeks ago she overdid a little bit with walking and had some pain over the front of her thigh.  Is getting better.    Left hip examination: Surgical incision well healed no erythema no drainage.  no pain with range of motion neurovascular tact distally    X-rays of the hip were reviewed independently interpreted by me today, the show stable total hip arthroplasty no fracture dislocation or loosening    Impression plan: 78 y.o. female 6 weeks out from left total hip.  We discussed continuing physical therapy and home exercise program. Pain medications to be used as needed. Assistive devices as needed per PT. We discussed DVT prophylaxis until 6 weeks. No dentist until 3 months and thereafter dental prophylaxis for life. Activity as tolerated weightbearing as tolerated, hip precautions until 3 months. I have personally reviewed the OARRS report for the patient. This report is scanned into the electronic medical record. I have considered the risks of abuse, dependence, addiction and diversion. Follow up 3 months with xrays

## 2023-11-29 ENCOUNTER — EVALUATION (OUTPATIENT)
Dept: PHYSICAL THERAPY | Facility: CLINIC | Age: 78
End: 2023-11-29
Payer: MEDICARE

## 2023-11-29 DIAGNOSIS — M25.552 LEFT HIP PAIN: Primary | ICD-10-CM

## 2023-11-29 DIAGNOSIS — M25.551 RIGHT HIP PAIN: ICD-10-CM

## 2023-11-29 PROCEDURE — 97161 PT EVAL LOW COMPLEX 20 MIN: CPT | Mod: GP | Performed by: PHYSICAL THERAPIST

## 2023-11-29 ASSESSMENT — ENCOUNTER SYMPTOMS
LOSS OF SENSATION IN FEET: 0
OCCASIONAL FEELINGS OF UNSTEADINESS: 0
DEPRESSION: 0

## 2023-11-29 ASSESSMENT — PAIN - FUNCTIONAL ASSESSMENT: PAIN_FUNCTIONAL_ASSESSMENT: 0-10

## 2023-11-29 ASSESSMENT — PAIN SCALES - GENERAL: PAINLEVEL_OUTOF10: 0 - NO PAIN

## 2023-11-29 NOTE — PROGRESS NOTES
"Physical Therapy    Physical Therapy Evaluation    Patient Name: Laura Banuelos  MRN: [id number]  Today's Date: 11/29/2023       Assessment   Patient is a 78 year old female s/p Left OLIVA with subsequent difficulty with walking, ADLs performance, and pain. Patient will benefit from skilled PT services through the progression of therapeutic exercises, gait training, and therapeutic activity to reach her maximal functional levels with minimal restrictions.    Plan   Initiate PT services and progress as tolerated    Current Problem  1. Left hip pain  Referral to Physical Therapy    Follow Up In Physical Therapy      2. Right hip pain            Subjective   General:  General  Reason for Referral: left OLIVA  Referred By: Panda  Past Medical History Relevant to Rehab: A-fib with ablasion 2015  \"I did really well with the surgery and home PT. At about week three I pushed a day of 1.5 mile walk (third day in a row) went shopping, and did my exercises. I was really sore in the front of the hip. It took until today to feel less soreness.\"  Precautions:     Vital Signs:     Pain:  Pain Assessment: 0-10  Pain Score: 0 - No pain (over the last few weeks 3-4/10)  Pain Type: Surgical pain  Pain Location: Hip  Pain Orientation: Left, Anterior  Home Living:  Home Living Comment: Single story home, one step in no basement  Prior Function Per Pt/Caregiver Report:  Level of [place]: Independent with ADLs and functional transfers    Objective   HIP    Hip Functional Rating Scale  LEFS   /80: 45    Hip Observation  Observation Comment: good mobility, but tension noted left quadriceps in the vastus medialis and rectus femoris, also along the sartorius       Lumbar AROM WFL unless documented below     Hip AROM WFL unless documented below  Hip AROM WFL: yes  Hip PROM WFL unless documented below     Specific Lower Extremity MMT WFL unless documented below  R Iliopsoas: (5/5): 4+/5  L Iliopsoas: (5/5): 4/5  R Gluteals (prone): (5/5): 5/5  L " Gluteals (prone): (5/5): 5/5  R Gluteals (sidelying): (5/5): 5/5  L Gluteals (sidelying): (5/5): 4/5  R knee flexion: (5/5): 5/5  L knee flexion: (5/5): 5/5  R knee extension: (5/5): 5/5  L knee extension: (5/5): 5/5  DTR WFL unless documented below     Special Tests Negative unless documented below       Gait  Gait Comment: significantly antalgic gait during stance phase and patient is needing to use standard cane    Flexibility  R hamstrings: normal  L hamstrings: normal  R hip flexors: good  L hip flexors: fair  R quads: good  L quads: fair    Outcome Measures:    LEFS: 45    OP EDUCATION:   Access Code: [email or web address]  URL: [email or web address]  Date: 11/29/2023  Prepared by: LETICIA Estrada    Exercises  - Supine Bridge  - 1 x daily - 7 x weekly - 2 sets - 10 reps  - Clamshell  - 1 x daily - 7 x weekly - 2 sets - 10 reps  - Prone Quadriceps Stretch with Strap  - 2 x daily - 7 x weekly - 1 sets - 3 reps - 20 seconds hold    Goals:  Active       PT Problem       PT Goal 1       Start:  11/29/23            PT Goal 2       Start:  11/29/23            PT Goal 3       Start:  11/29/23            PT Goal 4       Start:  11/29/23            PT Goal 5       Start:  11/29/23

## 2023-12-05 ENCOUNTER — APPOINTMENT (OUTPATIENT)
Dept: ORTHOPEDIC SURGERY | Facility: CLINIC | Age: 78
End: 2023-12-05
Payer: MEDICARE

## 2023-12-08 ENCOUNTER — TREATMENT (OUTPATIENT)
Dept: PHYSICAL THERAPY | Facility: CLINIC | Age: 78
End: 2023-12-08
Payer: MEDICARE

## 2023-12-08 DIAGNOSIS — M25.552 LEFT HIP PAIN: ICD-10-CM

## 2023-12-08 PROCEDURE — 97116 GAIT TRAINING THERAPY: CPT | Mod: GP | Performed by: PHYSICAL THERAPIST

## 2023-12-08 PROCEDURE — 97110 THERAPEUTIC EXERCISES: CPT | Mod: GP | Performed by: PHYSICAL THERAPIST

## 2023-12-08 ASSESSMENT — PAIN SCALES - GENERAL: PAINLEVEL_OUTOF10: 0 - NO PAIN

## 2023-12-08 ASSESSMENT — PAIN - FUNCTIONAL ASSESSMENT: PAIN_FUNCTIONAL_ASSESSMENT: 0-10

## 2023-12-08 NOTE — PROGRESS NOTES
"Physical Therapy    Physical Therapy Treatment    Patient Name: Laura Banuelos  MRN: [id number]  Today's Date: 12/8/2023         Assessment:  Patient and PT discussed progression of HEP to weights, adding hip flexion activity slowly, taking rest days, and progressing walking with arm swings and gait speed and step length.\"   Plan:   Patient will perform HEP and return in 3 weeks    Current Problem  1. Left hip pain  Follow Up In Physical Therapy          General     General  Reason for Referral: left OLIVA  Referred By: Panda  Past Medical History Relevant to Rehab: A-fib with ablasion 2015    Subjective    \"I am doing great. The few days of rest really helped get me back on track.\"  Precautions     Vital Signs     Pain  Pain Assessment  Pain Assessment: 0-10  Pain Score: 0 - No pain  Pain Type: Surgical pain    Objective     Treatments:  Therapeutic Exercise  Therapeutic Exercise Performed: Yes  Therapeutic Exercise Activity 1: SLR: left 10 x 2  Therapeutic Exercise Activity 2: Standing: hip flexor stretch: left 3 x 20\"  Therapeutic Exercise Activity 3: Scifit: seat 11, level 1, 5'  Therapeutic Exercise Activity 4: HEP discussion and how to progress    Gait training: see flow sheet  OP EDUCATION:   Access Code: [email or web address]  URL: [email or web address]  Date: 12/08/2023  Prepared by: LETICIA Estrada    Exercises  - Supine Active Straight Leg Raise (Mirrored)  - 1 x daily - 4 x weekly - 2 sets - 10 reps  - Standing Hip Flexor Stretch (Mirrored)  - 1 x daily - 7 x weekly - 1 sets - 3 reps - 20 seconds hold    Goals:  Active       PT Problem       Patient will increase tolerance to activity in standing for any desired duration in order to participate in ADL, IADL, work, and kelsey skills or activities.       Start:  11/29/23    Expected End:  01/10/24            Patient will increase left hip strength to 5/5 in order to participate in ADL, IADL, work, or leisure skills and activities.       Start:  " 11/29/23    Expected End:  01/10/24            Patient will improve walking tolerance to any desired duration with normalized gait mechanics and no assistive device in order to participate in ADL, IADL, work, or leisure skills and activities.       Start:  11/29/23    Expected End:  01/10/24            Patient will improve the outcome measure score of the LEFS to > 55 for increased independence and ease with performance of ADL, IADL, work, or leisure activities.       Start:  11/29/23    Expected End:  01/10/24            PT Goal 5       Start:  11/29/23    Expected End:  01/10/24

## 2023-12-19 ENCOUNTER — OFFICE VISIT (OUTPATIENT)
Dept: PRIMARY CARE | Facility: CLINIC | Age: 78
End: 2023-12-19
Payer: MEDICARE

## 2023-12-19 VITALS
HEIGHT: 64 IN | BODY MASS INDEX: 21.34 KG/M2 | WEIGHT: 125 LBS | SYSTOLIC BLOOD PRESSURE: 128 MMHG | DIASTOLIC BLOOD PRESSURE: 70 MMHG

## 2023-12-19 DIAGNOSIS — E89.0 HYPOTHYROIDISM, POSTABLATIVE: ICD-10-CM

## 2023-12-19 DIAGNOSIS — J06.9 VIRAL UPPER RESPIRATORY TRACT INFECTION WITH COUGH: ICD-10-CM

## 2023-12-19 DIAGNOSIS — J01.90 ACUTE SINUSITIS, RECURRENCE NOT SPECIFIED, UNSPECIFIED LOCATION: Primary | ICD-10-CM

## 2023-12-19 PROCEDURE — 1036F TOBACCO NON-USER: CPT | Performed by: INTERNAL MEDICINE

## 2023-12-19 PROCEDURE — 1160F RVW MEDS BY RX/DR IN RCRD: CPT | Performed by: INTERNAL MEDICINE

## 2023-12-19 PROCEDURE — 1159F MED LIST DOCD IN RCRD: CPT | Performed by: INTERNAL MEDICINE

## 2023-12-19 PROCEDURE — 99213 OFFICE O/P EST LOW 20 MIN: CPT | Performed by: INTERNAL MEDICINE

## 2023-12-19 PROCEDURE — 1126F AMNT PAIN NOTED NONE PRSNT: CPT | Performed by: INTERNAL MEDICINE

## 2023-12-19 RX ORDER — AZITHROMYCIN 500 MG/1
500 TABLET, FILM COATED ORAL DAILY
Qty: 10 TABLET | Refills: 0 | Status: SHIPPED | OUTPATIENT
Start: 2023-12-19 | End: 2023-12-29

## 2023-12-19 RX ORDER — LEVOTHYROXINE SODIUM 88 UG/1
88 TABLET ORAL DAILY
Qty: 90 TABLET | Refills: 0 | Status: SHIPPED | OUTPATIENT
Start: 2023-12-19 | End: 2024-03-14 | Stop reason: SDUPTHER

## 2023-12-19 ASSESSMENT — ENCOUNTER SYMPTOMS
LOSS OF SENSATION IN FEET: 0
DEPRESSION: 0
OCCASIONAL FEELINGS OF UNSTEADINESS: 0

## 2023-12-19 NOTE — PROGRESS NOTES
Subjective   Patient ID: Laura Banuelos is a 78 y.o. female who presents for URI.    HPI   Patient presents with complaints of having sore throat sinus congestion cough chest congestion for past 4 to 5 days.  She has recovered well from left total hip replacement     Past recap   patient is here for follow-up  Monday she is going for left hip surgery by Dr. Mosqueda  Got flu shot COVID shot  Needs refill on omeprazole  Levothyroxine     patient is here for follow-up on CT of the lungs  She has history of A. fib had ablation done she is off medications  She has history of heavy smoking in the past  She quit taking Sudafed and feeling better     Patient is here for follow-up on CT cardiac scoring  Also due for blood work     c/o feeling sob and dizziness for a while  Last month symptoms are getting worse  Denies any swelling in the legs  Denies any cough  Patient is taking to 2 Sudafed every day for last many months     patient here for f/u  headaches less   did blood work   due for colonoscopy soon for colon polyp   needs med refill  was eating lot of keys and egg  stopoped taking omeprazole   but needed to go on it again         past recap   Patient here for follow-up on abnormal blood work done by the neurologist  She is seeing neurologist for the headache but they are doing much better  She had MRI of the brain which showed cyst has a follow-up MRI scheduled for 6 months  She did colonoscopy shows tubular adenoma  She has follow-up appointment with Dr. Gr because of one large polyp which could not be removed and it was showing tubular adenoma  Follow-up on hypothyroidism high cholesterol and GERD  Doing very well on the current medications  Needs refills  Her blood work is due in January and she will be doing it then but does not want to come back     Patient is here for follow-up from urgent care  Couple of weeks ago patient started having some blurred vision Tuesday she started having headache about the right  "eye went to the urgent care and they got concerned that patient is having temporal arteritis. Patient was started on high-dose steroids. No blood work done for sed rate  No imaging study done  Patient says headache is less but still persisting describes it as dull headache. Denies any vision problems now had blurred vision couple of weeks ago        past recap  To establish PCP  Follow-up for high cholesterol hypothyroidism and A. fib s/p ablation  Due for blood work  Patient also has tubular adenoma and due for screening colonoscopy now  She got hearing aids and doing very well  Occasionally she gets lightheaded when she gets up too fast  Sometimes she does get numbness and tingling in the tips of the fingers and the feet     Past medical history: Graves' disease 40 years ago s/p radiation treatment now hypothyroidism, atrial fibrillation s/p ablation, ischemic colitis, appendectomy, osteoporosis, high cholesterol, tubular adenoma  Family history: Father coronary artery disease mother  of leukemia  Social history: Non-smoker, history of heavy smoking for long time drinks 1 wine every day  Health maintenance: Colonoscopy 2016      Review of Systems    Objective   /70   Ht 1.626 m (5' 4\")   Wt 56.7 kg (125 lb)   LMP  (LMP Unknown)   BMI 21.46 kg/m²     Physical Exam  Vitals reviewed.   Constitutional:       Appearance: Normal appearance.   HENT:      Head: Normocephalic and atraumatic.      Right Ear: Tympanic membrane, ear canal and external ear normal.      Left Ear: Tympanic membrane, ear canal and external ear normal.      Nose: Congestion and rhinorrhea present.      Mouth/Throat:      Pharynx: Oropharynx is clear.   Eyes:      Extraocular Movements: Extraocular movements intact.      Conjunctiva/sclera: Conjunctivae normal.      Pupils: Pupils are equal, round, and reactive to light.   Cardiovascular:      Rate and Rhythm: Normal rate and regular rhythm.      Pulses: Normal pulses.      Heart " sounds: Normal heart sounds.   Pulmonary:      Effort: Pulmonary effort is normal.      Breath sounds: Rhonchi present.   Abdominal:      General: Abdomen is flat. Bowel sounds are normal.      Palpations: Abdomen is soft.   Musculoskeletal:      Cervical back: Normal range of motion and neck supple.   Skin:     General: Skin is warm and dry.   Neurological:      General: No focal deficit present.      Mental Status: She is alert and oriented to person, place, and time.   Psychiatric:         Mood and Affect: Mood normal.         Assessment/Plan   Problem List Items Addressed This Visit          ENT    Viral upper respiratory tract infection with cough       Endocrine/Metabolic    Hypothyroidism, postablative    Relevant Medications    levothyroxine (Synthroid, Levoxyl) 88 mcg tablet    azithromycin (Zithromax) 500 mg tablet     Other Visit Diagnoses       Acute sinusitis, recurrence not specified, unspecified location    -  Primary           5/31  Injections normal sinus rhythm  Chest x-ray done shows no acute finding  Patient examination is normal  She had MRI done recently which was showing no acute findings  Her blood work recently in March was all normal no signs of anemia  We will do CT cardiac scoring to assess cardiac risk. Try Antivert to see if it helps  Also not sure if symptoms could be related to Sudafed  Advised patient to cut downtaking Sudafed   Follow-up after the test     8/1  CT cardiac scoring reviewed  Patient has elevated CT cardiac score but not critical  Discussed lifestyle modification  Lung nodule we will do CT chest without contrast  Patient has history of smoking which is probably the cause for her bronchiectasis  Discussed pulmonary hygiene  Blood work ordered     11/28/22  CT of the chest results reviewed  Patient has long history of smoking  She was 2 packs/day for many years  Which shows severe emphysema and bronchiectasis  Clinically patient has no signs of TB no cough no fever no  history of TB  Encourage patient to do pulmonary exercises  We will check 2D echo for atherosclerosis  Refer to cardiology for cardiac evaluation  Medications refilled  Follow-up in 3 months    4/1/2023  CBC CMP liver enzymes all normal  TSH little suppressed  Will reduce dose of Synthroid 88 mcg  Cholesterol very well controlled on Crestor  Continue omeprazole  Follow-up blood work in 6 months  Mammogram ordered  Patient is going to see the orthopedic for the knee    10/17/2023  Blood pressure is stable  Cholesterol well controlled thyroid in range  Continue current medications  Follow-up in 6 months    12/19/2023  Treat with Zithromax for 10 days  Steam saline gargles rest fluids  Follow-up if not better

## 2023-12-29 ENCOUNTER — APPOINTMENT (OUTPATIENT)
Dept: PHYSICAL THERAPY | Facility: CLINIC | Age: 78
End: 2023-12-29
Payer: MEDICARE

## 2024-01-05 ENCOUNTER — APPOINTMENT (OUTPATIENT)
Dept: PHYSICAL THERAPY | Facility: CLINIC | Age: 79
End: 2024-01-05
Payer: MEDICARE

## 2024-01-10 ENCOUNTER — APPOINTMENT (OUTPATIENT)
Dept: PHYSICAL THERAPY | Facility: CLINIC | Age: 79
End: 2024-01-10
Payer: MEDICARE

## 2024-01-15 ENCOUNTER — TREATMENT (OUTPATIENT)
Dept: PHYSICAL THERAPY | Facility: CLINIC | Age: 79
End: 2024-01-15
Payer: MEDICARE

## 2024-01-15 DIAGNOSIS — M25.552 LEFT HIP PAIN: ICD-10-CM

## 2024-01-15 PROCEDURE — 97530 THERAPEUTIC ACTIVITIES: CPT | Mod: GP | Performed by: PHYSICAL THERAPIST

## 2024-01-15 ASSESSMENT — PAIN - FUNCTIONAL ASSESSMENT: PAIN_FUNCTIONAL_ASSESSMENT: 0-10

## 2024-01-15 ASSESSMENT — PAIN SCALES - GENERAL: PAINLEVEL_OUTOF10: 0 - NO PAIN

## 2024-01-15 NOTE — PROGRESS NOTES
"Physical Therapy    Physical Therapy Treatment    Patient Name: Laura Banuelos  MRN: [id number]  Today's Date: 1/15/2024         Assessment:   Patient has achieved all long term goals and is compliant with HEP and progression of all activity.  Plan:   Discharge PT services    Current Problem  1. Left hip pain  Follow Up In Physical Therapy          General  PT  Visit  Response to Previous Treatment: Patient with no complaints from previous session.  General  Reason for Referral: left OLIVA  Referred By: Panda  Past Medical History Relevant to Rehab: A-fib with ablasion 2015    Subjective    Precautions     Vital Signs     Pain  Pain Assessment  Pain Assessment: 0-10  Pain Score: 0 - No pain  Pain Type: Surgical pain    Objective   HIP    Functional Rating Scale  LEFS   /80: 60  Observation  Observation Comment: normal gait pattern and movement patterns of the left hip with very slight adduction with flexion  Hip Palpation/Joint Mobility      Lumbar AROM     Hip AROM  Hip AROM WFL: yes  Hip PROM     Specific Lower Extremity MMT  R Iliopsoas: (5/5): 5/5  L Iliopsoas: (5/5): 5/5  R Gluteals (prone): (5/5): 5/5  L Gluteals (prone): (5/5): 5/5  R Gluteals (sidelying): (5/5): 5/5  L Gluteals (sidelying): (5/5): 5/5  R knee flexion: (5/5): 5/5  L knee flexion: (5/5): 5/5  R knee extension: (5/5): 5/5  L knee extension: (5/5): 5/5  DTR     Special Tests     Gait  Gait Comment: normalized gait pattern  Flexibility  R hamstrings: normal  L hamstrings: normal  R hip flexors: normal  L hip flexors: normal  R quads: normal  L quads: normal  Outcome Measures:   LEFS 60  Treatments:  Therapeutic Activity  Therapeutic Activity Performed: Yes  Therapeutic Activity 1: rechecking testing and measures and patient education on proper performance of clamshells, movement to \"warm up\" the hip and continued increase in activity levels as she tolerates.    OP EDUCATION:   Continued progression of therapeutic exercises and progression of all " activities.     Goals:  Active       PT Problem       Patient will increase tolerance to activity in standing for any desired duration in order to participate in ADL, IADL, work, and kelsey skills or activities. (Met)       Start:  11/29/23    Expected End:  01/15/24    Resolved:  01/15/24         Patient will increase left hip strength to 5/5 in order to participate in ADL, IADL, work, or leisure skills and activities. (Met)       Start:  11/29/23    Expected End:  01/15/24    Resolved:  01/15/24         Patient will improve walking tolerance to any desired duration with normalized gait mechanics and no assistive device in order to participate in ADL, IADL, work, or leisure skills and activities. (Met)       Start:  11/29/23    Expected End:  01/15/24    Resolved:  01/15/24         Patient will improve the outcome measure score of the LEFS to > 55 for increased independence and ease with performance of ADL, IADL, work, or leisure activities. (Met)       Start:  11/29/23    Expected End:  01/15/24    Resolved:  01/15/24         PT Goal 5       Start:  11/29/23    Expected End:  01/15/24

## 2024-01-31 DIAGNOSIS — E78.2 MIXED HYPERLIPIDEMIA: ICD-10-CM

## 2024-01-31 DIAGNOSIS — I48.0 PAROXYSMAL ATRIAL FIBRILLATION (MULTI): ICD-10-CM

## 2024-01-31 RX ORDER — OMEPRAZOLE 20 MG/1
20 CAPSULE, DELAYED RELEASE ORAL
Qty: 180 CAPSULE | Refills: 1 | Status: SHIPPED | OUTPATIENT
Start: 2024-01-31

## 2024-02-01 ENCOUNTER — OFFICE VISIT (OUTPATIENT)
Dept: CARDIOLOGY | Facility: CLINIC | Age: 79
End: 2024-02-01
Payer: MEDICARE

## 2024-02-01 VITALS
SYSTOLIC BLOOD PRESSURE: 134 MMHG | OXYGEN SATURATION: 95 % | WEIGHT: 126 LBS | BODY MASS INDEX: 21.51 KG/M2 | HEIGHT: 64 IN | HEART RATE: 69 BPM | DIASTOLIC BLOOD PRESSURE: 82 MMHG

## 2024-02-01 DIAGNOSIS — E78.5 HYPERLIPIDEMIA, UNSPECIFIED HYPERLIPIDEMIA TYPE: Primary | ICD-10-CM

## 2024-02-01 PROCEDURE — 1036F TOBACCO NON-USER: CPT | Performed by: NURSE PRACTITIONER

## 2024-02-01 PROCEDURE — 99215 OFFICE O/P EST HI 40 MIN: CPT | Performed by: NURSE PRACTITIONER

## 2024-02-01 PROCEDURE — 1126F AMNT PAIN NOTED NONE PRSNT: CPT | Performed by: NURSE PRACTITIONER

## 2024-02-01 PROCEDURE — 1157F ADVNC CARE PLAN IN RCRD: CPT | Performed by: NURSE PRACTITIONER

## 2024-02-01 PROCEDURE — 1160F RVW MEDS BY RX/DR IN RCRD: CPT | Performed by: NURSE PRACTITIONER

## 2024-02-01 PROCEDURE — 1159F MED LIST DOCD IN RCRD: CPT | Performed by: NURSE PRACTITIONER

## 2024-02-01 RX ORDER — ROSUVASTATIN CALCIUM 20 MG/1
20 TABLET, COATED ORAL DAILY
Qty: 90 TABLET | Refills: 1 | Status: SHIPPED | OUTPATIENT
Start: 2024-02-01 | End: 2025-01-31

## 2024-02-01 RX ORDER — ASPIRIN 81 MG/1
81 TABLET ORAL DAILY
COMMUNITY

## 2024-02-01 ASSESSMENT — ENCOUNTER SYMPTOMS
NEUROLOGICAL NEGATIVE: 1
MUSCULOSKELETAL NEGATIVE: 1
CARDIOVASCULAR NEGATIVE: 1
CONSTITUTIONAL NEGATIVE: 1
RESPIRATORY NEGATIVE: 1
GASTROINTESTINAL NEGATIVE: 1

## 2024-02-01 NOTE — PROGRESS NOTES
"Chief Complaint:   No chief complaint on file.    History Of Present Illness:    .Ms Banuelos returns in follow up.  She has labs planned this Spring with Dr De La Cruz.  Denies chest pain, sob, palpitations or pedal edema.           Last Recorded Vitals:  Blood pressure 173/66, pulse 69, height 1.626 m (5' 4\"), weight 57.2 kg (126 lb), SpO2 95 %.     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 11/17/2015    Iatrogenic hypothyroidism    Long term (current) use of anticoagulants 12/14/2016    Anticoagulated on warfarin    Nonscarring hair loss, unspecified 11/23/2015    Hair loss    Other specified postprocedural states 12/14/2016    Status post circumferential ablation of pulmonary vein    Other specified postprocedural states 12/16/2016    S/P ablation of atrial fibrillation    Personal history of other diseases of the circulatory system 11/17/2015    History of atrial fibrillation    Personal history of other endocrine, nutritional and metabolic disease 11/17/2015    History of Graves' disease    Personal history of other infectious and parasitic diseases 02/16/2018    History of herpes zoster    Personal history of other specified conditions 12/07/2017    History of headache    Vascular disorder of intestine, unspecified (CMS/HCC) 07/05/2017    Ischemic colitis        Past Surgical History:  Past Surgical History:   Procedure Laterality Date    ABLATION OF DYSRHYTHMIC FOCUS  2015    APPENDECTOMY  11/23/2015    Appendectomy    COLONOSCOPY  08/2023    Complete Colonoscopy       Social History:  Social History     Socioeconomic History    Marital status:      Spouse name: Not on file    Number of children: Not on file    Years of education: Not on file    Highest education level: Not on file   Occupational History    Not on file   Tobacco Use    Smoking status: Former     Years: 20     Types: Cigarettes    Smokeless tobacco: Never "   Substance and Sexual Activity    Alcohol use: Yes     Comment: occasional    Drug use: Never    Sexual activity: Defer   Other Topics Concern    Not on file   Social History Narrative    Not on file     Social Determinants of Health     Financial Resource Strain: Low Risk  (10/24/2023)    Overall Financial Resource Strain (CARDIA)     Difficulty of Paying Living Expenses: Not hard at all   Food Insecurity: Not on file   Transportation Needs: No Transportation Needs (11/8/2023)    OASIS : Transportation     Lack of Transportation (Medical): No     Lack of Transportation (Non-Medical): No     Patient Unable or Declines to Respond: No   Physical Activity: Not on file   Stress: Not on file   Social Connections: Feeling Socially Integrated (11/8/2023)    OASIS : Social Isolation     Frequency of experiencing loneliness or isolation: Never   Intimate Partner Violence: Not on file   Housing Stability: Low Risk  (10/24/2023)    Housing Stability Vital Sign     Unable to Pay for Housing in the Last Year: No     Number of Places Lived in the Last Year: 1     Unstable Housing in the Last Year: No       Family History:  Family History   Problem Relation Name Age of Onset    Leukemia Mother      Other (cardiac disorder) Father           Allergies:  Patient has no known allergies.    Outpatient Medications:  Current Outpatient Medications   Medication Sig Dispense Refill    calcium carbonate-vitamin D3 500 mg-5 mcg (200 unit) tablet Take by mouth once daily. Do not start before October 24, 2023. 30 tablet 0    cholecalciferol (Vitamin D-3) 50 MCG (2000 UT) tablet Take 1 tablet (2,000 Units) by mouth once daily.      cyanocobalamin (Vitamin B-12) 500 mcg tablet Take 1 tablet (500 mcg) by mouth once daily.      flaxseed oiL 1,000 mg capsule Take 1 capsule (1,000 mg) by mouth twice a day.      folic acid (Folvite) 1 mg tablet Take by mouth twice a day.      glucosamine sulfate 500 mg tablet Take 1,000 mg by mouth once  daily.      levothyroxine (Synthroid, Levoxyl) 88 mcg tablet Take 1 tablet (88 mcg) by mouth once daily. 90 tablet 0    multivitamin tablet Take by mouth.      omeprazole (PriLOSEC) 20 mg DR capsule Take 1 capsule (20 mg) by mouth 2 times a day before meals. 180 capsule 1    polyethylene glycol (Glycolax, Miralax) 17 gram/dose powder Take 17 g by mouth once daily. Takes 1 TBSP daily      rosuvastatin (Crestor) 20 mg tablet Take 1 tablet (20 mg) by mouth once daily.      turmeric-turmeric root extract 450-50 mg capsule Take by mouth.      vit C/E/Zn/coppr/lutein/zeaxan (PRESERVISION AREDS-2 ORAL) Take 1 capsule by mouth 2 times a day. Indications: macular edema associated with non-infectious uveitis      naproxen (Naprosyn) 375 mg tablet Take 1 tablet (375 mg) by mouth 2 times a day.       No current facility-administered medications for this visit.        Physical Exam:  Cardiovascular:      PMI at left midclavicular line. Normal rate. Regular rhythm. Normal S1. Normal S2.       Murmurs: There is no murmur.      No gallop.  No click. No rub.   Pulses:     Intact distal pulses.   Edema:     Peripheral edema absent.         ROS:  Review of Systems   Constitutional: Negative.   Cardiovascular: Negative.    Respiratory: Negative.     Skin: Negative.    Musculoskeletal: Negative.    Gastrointestinal: Negative.    Genitourinary: Negative.    Neurological: Negative.           Last Labs:  CBC -  Lab Results   Component Value Date    WBC 9.7 10/24/2023    HGB 11.2 (L) 10/24/2023    HCT 34.0 (L) 10/24/2023    MCV 94 10/24/2023     (L) 10/24/2023       CMP -  Lab Results   Component Value Date    CALCIUM 8.4 (L) 10/24/2023    PHOS 4.3 11/15/2018    PROT 7.4 10/13/2023    ALBUMIN 4.7 10/13/2023    AST 33 10/13/2023    ALT 26 10/13/2023    ALKPHOS 63 10/13/2023    BILITOT 0.5 10/13/2023       LIPID PANEL -   Lab Results   Component Value Date    CHOL 178 10/13/2023    TRIG 76 10/13/2023    HDL 82.6 10/13/2023    CHHDL 2.2  "10/13/2023    LDLF 72 04/17/2023    VLDL 15 10/13/2023    NHDL 95 10/13/2023       RENAL FUNCTION PANEL -   Lab Results   Component Value Date    GLUCOSE 129 (H) 10/24/2023     10/24/2023    K 4.4 10/24/2023     10/24/2023    CO2 25 10/24/2023    ANIONGAP 12 10/24/2023    BUN 18 10/24/2023    CREATININE 0.76 10/24/2023    CALCIUM 8.4 (L) 10/24/2023    PHOS 4.3 11/15/2018    ALBUMIN 4.7 10/13/2023        No results found for: \"BNP\", \"HGBA1C\"      Assessment/Plan   Problem List Items Addressed This Visit    None   Afib with hx of RFA in 2016.  Ecg done 10/2023 showed NSR.   2.  CAD.  Coronary artery calcium score of 246.53 when done 07/2022.  She had a lexiscan done 02/2023 that was negative for ischemia.   Echo 12/2022  CONCLUSIONS:   1. Left ventricular systolic function is normal with a 60-65% estimated ejection fraction.   2. Mild mitral valve regurgitation.   3. Mild tricuspid regurgitation visualized.   4. RVSP within normal limits.   5. The estimated PASP is 27 mmHg.  3.  Hyperlipidemia.    4.  Hypothyroidism.     Gianna Isaac APRN-CNP  "

## 2024-02-12 ENCOUNTER — TELEPHONE (OUTPATIENT)
Dept: ORTHOPEDIC SURGERY | Facility: CLINIC | Age: 79
End: 2024-02-12
Payer: MEDICARE

## 2024-02-12 DIAGNOSIS — Z01.818 PRE-OP EXAMINATION: Primary | ICD-10-CM

## 2024-02-12 RX ORDER — AMOXICILLIN 500 MG/1
CAPSULE ORAL
Qty: 4 CAPSULE | Refills: 6 | Status: SHIPPED | OUTPATIENT
Start: 2024-02-12

## 2024-02-27 ENCOUNTER — HOSPITAL ENCOUNTER (OUTPATIENT)
Dept: RADIOLOGY | Facility: HOSPITAL | Age: 79
Discharge: HOME | End: 2024-02-27
Payer: MEDICARE

## 2024-02-27 ENCOUNTER — OFFICE VISIT (OUTPATIENT)
Dept: ORTHOPEDIC SURGERY | Facility: CLINIC | Age: 79
End: 2024-02-27
Payer: MEDICARE

## 2024-02-27 DIAGNOSIS — M25.552 LEFT HIP PAIN: Primary | ICD-10-CM

## 2024-02-27 DIAGNOSIS — M25.552 LEFT HIP PAIN: ICD-10-CM

## 2024-02-27 PROCEDURE — 1157F ADVNC CARE PLAN IN RCRD: CPT | Performed by: ORTHOPAEDIC SURGERY

## 2024-02-27 PROCEDURE — 73502 X-RAY EXAM HIP UNI 2-3 VIEWS: CPT | Mod: LT

## 2024-02-27 PROCEDURE — 1159F MED LIST DOCD IN RCRD: CPT | Performed by: ORTHOPAEDIC SURGERY

## 2024-02-27 PROCEDURE — 1126F AMNT PAIN NOTED NONE PRSNT: CPT | Performed by: ORTHOPAEDIC SURGERY

## 2024-02-27 PROCEDURE — 1160F RVW MEDS BY RX/DR IN RCRD: CPT | Performed by: ORTHOPAEDIC SURGERY

## 2024-02-27 PROCEDURE — 73502 X-RAY EXAM HIP UNI 2-3 VIEWS: CPT | Mod: LEFT SIDE | Performed by: RADIOLOGY

## 2024-02-27 PROCEDURE — 1036F TOBACCO NON-USER: CPT | Performed by: ORTHOPAEDIC SURGERY

## 2024-02-27 PROCEDURE — 99213 OFFICE O/P EST LOW 20 MIN: CPT | Performed by: ORTHOPAEDIC SURGERY

## 2024-02-27 NOTE — PROGRESS NOTES
This is a consultation from Dr. Tamar De La Cruz MD for   Chief Complaint   Patient presents with    Left Hip - Pain     10/23/23 LT OLIVA       This is a 78 y.o. female who presents for follow-up for left total hip.  Patient is about 5 months out, she is doing very well.  She has no pain in the hip she has excellent resolution of her preoperative symptoms.    Incision no fevers or chills.  She is walking without difficulty, she does not use any assistive devices and does not take any medications for her hip.    Physical Exam    There has been no interval change in this patient's past medical, surgical, medications, allergies, family history or social history since the most recent visit to a provider within our department. 14 point review of systems was performed, reviewed, and negative except for pertinent positives documented in the history of present illness.     Constitutional: well developed, well nourished female in no acute distress  Psychiatric: normal mood, appropriate affect  Eyes: sclera anicteric  HENT: normocephalic/atraumatic  CV: regular rate and rhythm   Respiratory: non labored breathing  Integumentary: no rash  Neurological: moves all extremities    Left hip examination: Well-healed surgical incision erythema no drainage no pain.  Motion neurovascular tact distally    Xrays were ordered by me, they were reviewed and independently interpreted by me today, they show stable left total hip arthroplasty no fracture dislocation or loosening    Procedures      Impression/Plan: This is a 78 y.o. female status post left total hip doing well.  Weightbearing as tolerated activity as tolerated back to see her back for routine radiographic follow-up    BMI Readings from Last 1 Encounters:   02/01/24 21.63 kg/m²      Lab Results   Component Value Date    CREATININE 0.76 10/24/2023     Tobacco Use: Medium Risk (2/27/2024)    Patient History     Smoking Tobacco Use: Former     Smokeless Tobacco Use: Never     Passive  "Exposure: Not on file      Computed MELD 3.0 unavailable. Necessary lab results were not found in the last year.  Computed MELD-Na unavailable. Necessary lab results were not found in the last year.       No results found for: \"HGBA1C\"  Lab Results   Component Value Date    STAPHMRSASCR No Staphylococcus aureus isolated 10/03/2023     "

## 2024-03-14 DIAGNOSIS — E89.0 HYPOTHYROIDISM, POSTABLATIVE: ICD-10-CM

## 2024-03-14 RX ORDER — LEVOTHYROXINE SODIUM 88 UG/1
88 TABLET ORAL DAILY
Qty: 90 TABLET | Refills: 0 | Status: SHIPPED | OUTPATIENT
Start: 2024-03-14 | End: 2024-04-16 | Stop reason: SDUPTHER

## 2024-03-26 ENCOUNTER — OFFICE VISIT (OUTPATIENT)
Dept: ORTHOPEDIC SURGERY | Facility: CLINIC | Age: 79
End: 2024-03-26
Payer: MEDICARE

## 2024-03-26 ENCOUNTER — HOSPITAL ENCOUNTER (OUTPATIENT)
Dept: RADIOLOGY | Facility: HOSPITAL | Age: 79
Discharge: HOME | End: 2024-03-26
Payer: MEDICARE

## 2024-03-26 DIAGNOSIS — M25.562 ACUTE BILATERAL KNEE PAIN: ICD-10-CM

## 2024-03-26 DIAGNOSIS — M25.562 ACUTE BILATERAL KNEE PAIN: Primary | ICD-10-CM

## 2024-03-26 DIAGNOSIS — M25.561 ACUTE BILATERAL KNEE PAIN: ICD-10-CM

## 2024-03-26 DIAGNOSIS — M25.561 ACUTE BILATERAL KNEE PAIN: Primary | ICD-10-CM

## 2024-03-26 PROCEDURE — 1157F ADVNC CARE PLAN IN RCRD: CPT | Performed by: ORTHOPAEDIC SURGERY

## 2024-03-26 PROCEDURE — 73560 X-RAY EXAM OF KNEE 1 OR 2: CPT | Mod: LT

## 2024-03-26 PROCEDURE — 1036F TOBACCO NON-USER: CPT | Performed by: ORTHOPAEDIC SURGERY

## 2024-03-26 PROCEDURE — 99214 OFFICE O/P EST MOD 30 MIN: CPT | Performed by: ORTHOPAEDIC SURGERY

## 2024-03-26 PROCEDURE — 1159F MED LIST DOCD IN RCRD: CPT | Performed by: ORTHOPAEDIC SURGERY

## 2024-03-26 PROCEDURE — 73562 X-RAY EXAM OF KNEE 3: CPT | Mod: RT

## 2024-03-26 PROCEDURE — 1160F RVW MEDS BY RX/DR IN RCRD: CPT | Performed by: ORTHOPAEDIC SURGERY

## 2024-03-26 PROCEDURE — 73560 X-RAY EXAM OF KNEE 1 OR 2: CPT | Mod: RIGHT SIDE | Performed by: RADIOLOGY

## 2024-03-26 PROCEDURE — 73562 X-RAY EXAM OF KNEE 3: CPT | Mod: RIGHT SIDE | Performed by: RADIOLOGY

## 2024-03-26 PROCEDURE — 20610 DRAIN/INJ JOINT/BURSA W/O US: CPT | Performed by: ORTHOPAEDIC SURGERY

## 2024-03-26 RX ORDER — TRIAMCINOLONE ACETONIDE 40 MG/ML
1 INJECTION, SUSPENSION INTRA-ARTICULAR; INTRAMUSCULAR
Status: COMPLETED | OUTPATIENT
Start: 2024-03-26 | End: 2024-03-26

## 2024-03-26 RX ADMIN — TRIAMCINOLONE ACETONIDE 1 ML: 40 INJECTION, SUSPENSION INTRA-ARTICULAR; INTRAMUSCULAR at 10:36

## 2024-03-26 NOTE — PROGRESS NOTES
This is a consultation from Dr. Tamar De La Cruz MD for   Chief Complaint   Patient presents with    Right Knee - Pain    Left Knee - Pain       This is a 78 y.o. female who presents for follow-up for her bilateral knees.  Patient had a right knee injection about a year ago, gave her great relief.  She has had return of her symptoms exacerbation of the pain in the last few weeks.  She also has similar symptoms on the left side, not as bad as the right.  Sharp pain over the lateral knee.  No previous injections on the left side.  No numbness or tingling no fevers or chills no shooting pain down the leg on either side.  Takes Tylenol when needed.    Physical Exam    There has been no interval change in this patient's past medical, surgical, medications, allergies, family history or social history since the most recent visit to a provider within our department. 14 point review of systems was performed, reviewed, and negative except for pertinent positives documented in the history of present illness.     Constitutional: well developed, well nourished female in no acute distress  Psychiatric: normal mood, appropriate affect  Eyes: sclera anicteric  HENT: normocephalic/atraumatic  CV: regular rate and rhythm   Respiratory: non labored breathing  Integumentary: no rash  Neurological: moves all extremities    Bilateral knee exam: skin intact no lacerations or abrations.  1+ effusion.  Tender lateral joint line. negative log roll negative patellar grind. ROM 0-120. stable to varus and valgus stress at 0 and 30 degrees. negative lachman negative posterior drawer negative gregorio. 5/5 ehl/fhl/gs/ta. silt s/s/sp/dp/t. 2+ dp/pt        Xrays were ordered by me, they were reviewed and independently interpreted by me today, they show severe degenerative disease on the right side with lateral bone-on-bone arthritis, moderate disease in the left    L Inj/Asp: bilateral knee on 3/26/2024 10:36 AM  Indications: pain and joint  swelling  Details: 22 G needle, anterolateral approach  Medications (Right): 1 mL triamcinolone acetonide 40 mg/mL  Medications (Left): 1 mL triamcinolone acetonide 40 mg/mL    Discussion:  I discussed the conservative treatment options for knee osteoarthritis including but not limited to physical therapy, oral NSAIDS, activity and lifestyle modification, and corticosteroid injections. Pt has elected to undergo a cortisone injection today. I have explained the risk and benefits of an injection including the possibility of joint infection, bleeding, damage to cartilage, allergic reaction. Patient verbalized understanding and gave verbal consent wishes to proceed with a intra-articular cortisone injection for their knee.    Procedure:  After discussing the risk and benefits of the procedure, we proceeded with an intra-articular bilateral knee injection. We discussed the risks and benefits and potential morbidity related to the treatment, and to the prescription medication administered in the injection    With the patient's informed verbal consent, the bilateral knees were prepped in standard sterile fashion with Chlorhexidine. The skin was then anesthetized with ethyl chloride spray and cleaned again with Chlorhexidine. The bilateral knees were then apirated/injected with a prefilled 20-gauge syringe of 40 mg Kenalog + 4 ml Lidocaine using the lateral approach without complications.  The patient tolerated this well and felt immediate initial relief of symptoms. A bandaid was applied and the patient ambulated out of the clinic on ther own accord without difficulty. Patient was instructed to avoid physical activity for 24-48 hours to prevent the knees from swelling and may ice the knees as tolerated. Patient should contact the office if any signs of of infection appear: redness, fever, chills, drainage, swelling or warmth to the knees.  Pt understands that the injections can be repeated no sooner than 3  "months.      Procedure, treatment alternatives, risks and benefits explained, specific risks discussed. Consent was given by the patient. Immediately prior to procedure a time out was called to verify the correct patient, procedure, equipment, support staff and site/side marked as required. Patient was prepped and draped in the usual sterile fashion.             Impression/Plan: This is a 78 y.o. female with bilateral knee arthritis.  I had an in depth discussion with the patient regarding treatment options for arthritis and their relative risks and benefits. We reviewed surgical and nonsurgical option for treatment. Treatments include anti inflammatory medications, physical therapy, weight loss, activity modification, use of assistive devices, injection therapies. We discussed current prescriptions and risks and benefits of continuation of prescription medication as apporpriate. We discussed that arthritis is often progressive over time, an in end stage arthritis surgical interventions can be considered, including arthroplasty. All questions were answered and the patient voiced their understanding.  Doing well with nonsurgical treatment I will see her back    BMI Readings from Last 1 Encounters:   02/01/24 21.63 kg/m²      Lab Results   Component Value Date    CREATININE 0.76 10/24/2023     Tobacco Use: Medium Risk (3/26/2024)    Patient History     Smoking Tobacco Use: Former     Smokeless Tobacco Use: Never     Passive Exposure: Not on file      Computed MELD 3.0 unavailable. Necessary lab results were not found in the last year.  Computed MELD-Na unavailable. Necessary lab results were not found in the last year.       No results found for: \"HGBA1C\"  Lab Results   Component Value Date    STAPHMRSASCR No Staphylococcus aureus isolated 10/03/2023     "

## 2024-04-05 ENCOUNTER — APPOINTMENT (OUTPATIENT)
Dept: ORTHOPEDIC SURGERY | Facility: CLINIC | Age: 79
End: 2024-04-05
Payer: MEDICARE

## 2024-04-12 ENCOUNTER — LAB (OUTPATIENT)
Dept: LAB | Facility: LAB | Age: 79
End: 2024-04-12
Payer: MEDICARE

## 2024-04-12 DIAGNOSIS — E55.9 VITAMIN D INSUFFICIENCY: ICD-10-CM

## 2024-04-12 DIAGNOSIS — E78.2 MIXED HYPERLIPIDEMIA: ICD-10-CM

## 2024-04-12 DIAGNOSIS — E78.00 PURE HYPERCHOLESTEROLEMIA: ICD-10-CM

## 2024-04-12 DIAGNOSIS — I48.0 PAROXYSMAL ATRIAL FIBRILLATION (MULTI): ICD-10-CM

## 2024-04-12 DIAGNOSIS — E89.0 HYPOTHYROIDISM, POSTABLATIVE: ICD-10-CM

## 2024-04-12 LAB
ALBUMIN SERPL BCP-MCNC: 4.3 G/DL (ref 3.4–5)
ALP SERPL-CCNC: 53 U/L (ref 33–136)
ALT SERPL W P-5'-P-CCNC: 20 U/L (ref 7–45)
ANION GAP SERPL CALC-SCNC: 11 MMOL/L (ref 10–20)
AST SERPL W P-5'-P-CCNC: 22 U/L (ref 9–39)
BILIRUB SERPL-MCNC: 0.4 MG/DL (ref 0–1.2)
BUN SERPL-MCNC: 24 MG/DL (ref 6–23)
CALCIUM SERPL-MCNC: 9.1 MG/DL (ref 8.6–10.3)
CHLORIDE SERPL-SCNC: 104 MMOL/L (ref 98–107)
CHOLEST SERPL-MCNC: 156 MG/DL (ref 0–199)
CHOLESTEROL/HDL RATIO: 2.1
CO2 SERPL-SCNC: 28 MMOL/L (ref 21–32)
CREAT SERPL-MCNC: 0.96 MG/DL (ref 0.5–1.05)
EGFRCR SERPLBLD CKD-EPI 2021: 61 ML/MIN/1.73M*2
ERYTHROCYTE [DISTWIDTH] IN BLOOD BY AUTOMATED COUNT: 14.3 % (ref 11.5–14.5)
GLUCOSE SERPL-MCNC: 116 MG/DL (ref 74–99)
HCT VFR BLD AUTO: 44.4 % (ref 36–46)
HDLC SERPL-MCNC: 72.6 MG/DL
HGB BLD-MCNC: 14.4 G/DL (ref 12–16)
LDLC SERPL CALC-MCNC: 60 MG/DL
MCH RBC QN AUTO: 31.8 PG (ref 26–34)
MCHC RBC AUTO-ENTMCNC: 32.4 G/DL (ref 32–36)
MCV RBC AUTO: 98 FL (ref 80–100)
NON HDL CHOLESTEROL: 83 MG/DL (ref 0–149)
NRBC BLD-RTO: 0 /100 WBCS (ref 0–0)
PLATELET # BLD AUTO: 193 X10*3/UL (ref 150–450)
POTASSIUM SERPL-SCNC: 4.3 MMOL/L (ref 3.5–5.3)
PROT SERPL-MCNC: 6.7 G/DL (ref 6.4–8.2)
RBC # BLD AUTO: 4.53 X10*6/UL (ref 4–5.2)
SODIUM SERPL-SCNC: 139 MMOL/L (ref 136–145)
TRIGL SERPL-MCNC: 117 MG/DL (ref 0–149)
TSH SERPL-ACNC: 2.99 MIU/L (ref 0.44–3.98)
VLDL: 23 MG/DL (ref 0–40)
WBC # BLD AUTO: 8.1 X10*3/UL (ref 4.4–11.3)

## 2024-04-12 PROCEDURE — 85027 COMPLETE CBC AUTOMATED: CPT

## 2024-04-12 PROCEDURE — 80053 COMPREHEN METABOLIC PANEL: CPT

## 2024-04-12 PROCEDURE — 36415 COLL VENOUS BLD VENIPUNCTURE: CPT

## 2024-04-12 PROCEDURE — 84443 ASSAY THYROID STIM HORMONE: CPT

## 2024-04-12 PROCEDURE — 80061 LIPID PANEL: CPT

## 2024-04-12 PROCEDURE — 82306 VITAMIN D 25 HYDROXY: CPT

## 2024-04-13 LAB — 25(OH)D3 SERPL-MCNC: 66 NG/ML (ref 30–100)

## 2024-04-16 ENCOUNTER — OFFICE VISIT (OUTPATIENT)
Dept: PRIMARY CARE | Facility: CLINIC | Age: 79
End: 2024-04-16
Payer: MEDICARE

## 2024-04-16 VITALS
DIASTOLIC BLOOD PRESSURE: 66 MMHG | HEIGHT: 64 IN | WEIGHT: 127 LBS | BODY MASS INDEX: 21.68 KG/M2 | SYSTOLIC BLOOD PRESSURE: 142 MMHG

## 2024-04-16 DIAGNOSIS — Z12.31 SCREENING MAMMOGRAM, ENCOUNTER FOR: ICD-10-CM

## 2024-04-16 DIAGNOSIS — E78.2 MIXED HYPERLIPIDEMIA: ICD-10-CM

## 2024-04-16 DIAGNOSIS — M81.0 AGE RELATED OSTEOPOROSIS, UNSPECIFIED PATHOLOGICAL FRACTURE PRESENCE: ICD-10-CM

## 2024-04-16 DIAGNOSIS — R20.0 NUMBNESS AND TINGLING OF RIGHT ARM: ICD-10-CM

## 2024-04-16 DIAGNOSIS — E89.0 HYPOTHYROIDISM, POSTABLATIVE: ICD-10-CM

## 2024-04-16 DIAGNOSIS — R20.2 NUMBNESS AND TINGLING OF RIGHT ARM: ICD-10-CM

## 2024-04-16 PROCEDURE — 1157F ADVNC CARE PLAN IN RCRD: CPT | Performed by: INTERNAL MEDICINE

## 2024-04-16 PROCEDURE — 1159F MED LIST DOCD IN RCRD: CPT | Performed by: INTERNAL MEDICINE

## 2024-04-16 PROCEDURE — 99214 OFFICE O/P EST MOD 30 MIN: CPT | Performed by: INTERNAL MEDICINE

## 2024-04-16 PROCEDURE — 1160F RVW MEDS BY RX/DR IN RCRD: CPT | Performed by: INTERNAL MEDICINE

## 2024-04-16 RX ORDER — LEVOTHYROXINE SODIUM 88 UG/1
88 TABLET ORAL DAILY
Qty: 90 TABLET | Refills: 1 | Status: SHIPPED | OUTPATIENT
Start: 2024-04-16 | End: 2025-04-16

## 2024-04-16 NOTE — PROGRESS NOTES
Subjective   Patient ID: Laura Banuelos is a 78 y.o. female who presents for Follow-up and Med Refill.    HPI   Patient is here for follow-up  Follow-up on hypothyroidism and high cholesterol  Complaining of right shoulder pain having numbness and tingling in the fourth and fifth finger on the right  Due for mammogram and bone density    Past recap   patient presents with complaints of having sore throat sinus congestion cough chest congestion for past 4 to 5 days.  She has recovered well from left total hip replacement     patient is here for follow-up  Monday she is going for left hip surgery by Dr. Mosqueda  Got flu shot COVID shot  Needs refill on omeprazole  Levothyroxine     patient is here for follow-up on CT of the lungs  She has history of A. fib had ablation done she is off medications  She has history of heavy smoking in the past  She quit taking Sudafed and feeling better     Patient is here for follow-up on CT cardiac scoring  Also due for blood work     c/o feeling sob and dizziness for a while  Last month symptoms are getting worse  Denies any swelling in the legs  Denies any cough  Patient is taking to 2 Sudafed every day for last many months     patient here for f/u  headaches less   did blood work   due for colonoscopy soon for colon polyp   needs med refill  was eating lot of keys and egg  stopoped taking omeprazole   but needed to go on it again         past recap   Patient here for follow-up on abnormal blood work done by the neurologist  She is seeing neurologist for the headache but they are doing much better  She had MRI of the brain which showed cyst has a follow-up MRI scheduled for 6 months  She did colonoscopy shows tubular adenoma  She has follow-up appointment with Dr. Gr because of one large polyp which could not be removed and it was showing tubular adenoma  Follow-up on hypothyroidism high cholesterol and GERD  Doing very well on the current medications  Needs refills  Her blood  "work is due in January and she will be doing it then but does not want to come back     Patient is here for follow-up from urgent care  Couple of weeks ago patient started having some blurred vision Tuesday she started having headache about the right eye went to the urgent care and they got concerned that patient is having temporal arteritis. Patient was started on high-dose steroids. No blood work done for sed rate  No imaging study done  Patient says headache is less but still persisting describes it as dull headache. Denies any vision problems now had blurred vision couple of weeks ago        past recap  To establish PCP  Follow-up for high cholesterol hypothyroidism and A. fib s/p ablation  Due for blood work  Patient also has tubular adenoma and due for screening colonoscopy now  She got hearing aids and doing very well  Occasionally she gets lightheaded when she gets up too fast  Sometimes she does get numbness and tingling in the tips of the fingers and the feet     Past medical history: Graves' disease 40 years ago s/p radiation treatment now hypothyroidism, atrial fibrillation s/p ablation, ischemic colitis, appendectomy, osteoporosis, high cholesterol, tubular adenoma  Family history: Father coronary artery disease mother  of leukemia  Social history: Non-smoker, history of heavy smoking for long time drinks 1 wine every day  Health maintenance: Colonoscopy 2016      Review of Systems    Objective   /66   Ht 1.626 m (5' 4\")   Wt 57.6 kg (127 lb)   LMP  (LMP Unknown)   BMI 21.80 kg/m²     Physical Exam  Vitals reviewed.   Constitutional:       Appearance: Normal appearance.   HENT:      Head: Normocephalic and atraumatic.      Right Ear: Tympanic membrane, ear canal and external ear normal.      Left Ear: Tympanic membrane, ear canal and external ear normal.      Mouth/Throat:      Pharynx: Oropharynx is clear.   Eyes:      Extraocular Movements: Extraocular movements intact.      " Conjunctiva/sclera: Conjunctivae normal.      Pupils: Pupils are equal, round, and reactive to light.   Cardiovascular:      Rate and Rhythm: Normal rate and regular rhythm.      Pulses: Normal pulses.      Heart sounds: Normal heart sounds.   Pulmonary:      Effort: Pulmonary effort is normal.   Abdominal:      General: Abdomen is flat. Bowel sounds are normal.      Palpations: Abdomen is soft.   Musculoskeletal:      Cervical back: Normal range of motion and neck supple.   Skin:     General: Skin is warm and dry.   Neurological:      General: No focal deficit present.      Mental Status: She is alert and oriented to person, place, and time.   Psychiatric:         Mood and Affect: Mood normal.         Assessment/Plan   Problem List Items Addressed This Visit          Cardiac and Vasculature    Hyperlipidemia    Relevant Orders    CBC    Comprehensive Metabolic Panel    Lipid Panel    Thyroid Stimulating Hormone       Endocrine/Metabolic    Hypothyroidism, postablative    Relevant Medications    levothyroxine (Synthroid, Levoxyl) 88 mcg tablet    Other Relevant Orders    CBC    Comprehensive Metabolic Panel    Lipid Panel    Thyroid Stimulating Hormone    Osteoporosis    Relevant Orders    XR DEXA bone density     Other Visit Diagnoses       Screening mammogram, encounter for        Relevant Orders    BI mammo bilateral screening tomosynthesis    Numbness and tingling of right arm        Relevant Orders    EMG & nerve conduction           5/31  Injections normal sinus rhythm  Chest x-ray done shows no acute finding  Patient examination is normal  She had MRI done recently which was showing no acute findings  Her blood work recently in March was all normal no signs of anemia  We will do CT cardiac scoring to assess cardiac risk. Try Antivert to see if it helps  Also not sure if symptoms could be related to Sudafed  Advised patient to cut downtaking Sudafed   Follow-up after the test     8/1  CT cardiac scoring  reviewed  Patient has elevated CT cardiac score but not critical  Discussed lifestyle modification  Lung nodule we will do CT chest without contrast  Patient has history of smoking which is probably the cause for her bronchiectasis  Discussed pulmonary hygiene  Blood work ordered     11/28/22  CT of the chest results reviewed  Patient has long history of smoking  She was 2 packs/day for many years  Which shows severe emphysema and bronchiectasis  Clinically patient has no signs of TB no cough no fever no history of TB  Encourage patient to do pulmonary exercises  We will check 2D echo for atherosclerosis  Refer to cardiology for cardiac evaluation  Medications refilled  Follow-up in 3 months    4/1/2023  CBC CMP liver enzymes all normal  TSH little suppressed  Will reduce dose of Synthroid 88 mcg  Cholesterol very well controlled on Crestor  Continue omeprazole  Follow-up blood work in 6 months  Mammogram ordered  Patient is going to see the orthopedic for the knee    10/17/2023  Blood pressure is stable  Cholesterol well controlled thyroid in range  Continue current medications  Follow-up in 6 months    12/19/2023  Treat with Zithromax for 10 days  Steam saline gargles rest fluids  Follow-up if not better    4/16/2024  Refill Synthroid  TSH in range  Cholesterol very well-controlled  Will do EMG of the right arm  Most likely patient has radiculopathy causing the fourth and fifth finger numb and Shoulder pain  Bone density ordered  Mammogram ordered  Follow-up in 6 months

## 2024-04-22 ENCOUNTER — HOSPITAL ENCOUNTER (OUTPATIENT)
Dept: RADIOLOGY | Facility: HOSPITAL | Age: 79
Discharge: HOME | End: 2024-04-22
Payer: MEDICARE

## 2024-04-22 DIAGNOSIS — M81.0 AGE RELATED OSTEOPOROSIS, UNSPECIFIED PATHOLOGICAL FRACTURE PRESENCE: ICD-10-CM

## 2024-04-22 DIAGNOSIS — Z12.31 SCREENING MAMMOGRAM, ENCOUNTER FOR: ICD-10-CM

## 2024-04-23 ENCOUNTER — APPOINTMENT (OUTPATIENT)
Dept: RADIOLOGY | Facility: HOSPITAL | Age: 79
End: 2024-04-23
Payer: MEDICARE

## 2024-05-14 ENCOUNTER — HOSPITAL ENCOUNTER (OUTPATIENT)
Dept: RADIOLOGY | Facility: HOSPITAL | Age: 79
Discharge: HOME | End: 2024-05-14
Payer: MEDICARE

## 2024-05-14 VITALS — HEIGHT: 64 IN | BODY MASS INDEX: 20.49 KG/M2 | WEIGHT: 120 LBS

## 2024-05-14 PROCEDURE — 77080 DXA BONE DENSITY AXIAL: CPT

## 2024-05-14 PROCEDURE — 77067 SCR MAMMO BI INCL CAD: CPT

## 2024-05-14 PROCEDURE — 77063 BREAST TOMOSYNTHESIS BI: CPT | Performed by: RADIOLOGY

## 2024-05-14 PROCEDURE — 77067 SCR MAMMO BI INCL CAD: CPT | Performed by: RADIOLOGY

## 2024-07-15 ENCOUNTER — APPOINTMENT (OUTPATIENT)
Dept: PRIMARY CARE | Facility: CLINIC | Age: 79
End: 2024-07-15
Payer: MEDICARE

## 2024-07-15 VITALS
WEIGHT: 126.4 LBS | OXYGEN SATURATION: 93 % | BODY MASS INDEX: 21.7 KG/M2 | TEMPERATURE: 98.4 F | DIASTOLIC BLOOD PRESSURE: 76 MMHG | HEART RATE: 66 BPM | SYSTOLIC BLOOD PRESSURE: 156 MMHG

## 2024-07-15 DIAGNOSIS — E78.2 MIXED HYPERLIPIDEMIA: ICD-10-CM

## 2024-07-15 DIAGNOSIS — E46 PROTEIN-CALORIE MALNUTRITION, UNSPECIFIED SEVERITY (MULTI): ICD-10-CM

## 2024-07-15 DIAGNOSIS — J44.9 CHRONIC OBSTRUCTIVE PULMONARY DISEASE, UNSPECIFIED COPD TYPE (MULTI): Primary | ICD-10-CM

## 2024-07-15 DIAGNOSIS — E05.00 GRAVES DISEASE: ICD-10-CM

## 2024-07-15 DIAGNOSIS — K55.9 ISCHEMIC COLITIS (MULTI): ICD-10-CM

## 2024-07-15 DIAGNOSIS — K21.9 GASTROESOPHAGEAL REFLUX DISEASE WITHOUT ESOPHAGITIS: ICD-10-CM

## 2024-07-15 DIAGNOSIS — H11.31 CONJUNCTIVAL HEMORRHAGE OF RIGHT EYE: ICD-10-CM

## 2024-07-15 DIAGNOSIS — Z98.890 HISTORY OF HIP SURGERY: ICD-10-CM

## 2024-07-15 DIAGNOSIS — I48.0 PAROXYSMAL ATRIAL FIBRILLATION (MULTI): ICD-10-CM

## 2024-07-15 PROBLEM — M31.6 TEMPORAL ARTERITIS (MULTI): Status: RESOLVED | Noted: 2023-05-01 | Resolved: 2024-07-15

## 2024-07-15 PROBLEM — G95.0 SYRINX OF SPINAL CORD (MULTI): Status: RESOLVED | Noted: 2023-05-01 | Resolved: 2024-07-15

## 2024-07-15 PROCEDURE — 1157F ADVNC CARE PLAN IN RCRD: CPT | Performed by: INTERNAL MEDICINE

## 2024-07-15 PROCEDURE — 1160F RVW MEDS BY RX/DR IN RCRD: CPT | Performed by: INTERNAL MEDICINE

## 2024-07-15 PROCEDURE — 1036F TOBACCO NON-USER: CPT | Performed by: INTERNAL MEDICINE

## 2024-07-15 PROCEDURE — 1159F MED LIST DOCD IN RCRD: CPT | Performed by: INTERNAL MEDICINE

## 2024-07-15 PROCEDURE — 99214 OFFICE O/P EST MOD 30 MIN: CPT | Performed by: INTERNAL MEDICINE

## 2024-07-15 RX ORDER — ALBUTEROL SULFATE 90 UG/1
2 AEROSOL, METERED RESPIRATORY (INHALATION) EVERY 4 HOURS PRN
Qty: 18 G | Refills: 0 | Status: SHIPPED | OUTPATIENT
Start: 2024-07-15 | End: 2025-07-15

## 2024-07-15 RX ORDER — OMEPRAZOLE 20 MG/1
20 CAPSULE, DELAYED RELEASE ORAL
Qty: 180 CAPSULE | Refills: 0 | Status: SHIPPED | OUTPATIENT
Start: 2024-07-15

## 2024-07-15 ASSESSMENT — ENCOUNTER SYMPTOMS: SHORTNESS OF BREATH: 1

## 2024-07-15 ASSESSMENT — PATIENT HEALTH QUESTIONNAIRE - PHQ9
1. LITTLE INTEREST OR PLEASURE IN DOING THINGS: NOT AT ALL
SUM OF ALL RESPONSES TO PHQ9 QUESTIONS 1 AND 2: 0
2. FEELING DOWN, DEPRESSED OR HOPELESS: NOT AT ALL

## 2024-07-15 NOTE — PROGRESS NOTES
Subjective   Patient ID: Laura Banuelos is a 79 y.o. female who presents for New Patient Visit (Due for Annual BW in October), Eye Problem (Right eye blood shot x3 days-  no pain, drainage or obvious cause/-Happened about 1 month ago also), and Shortness of Breath (Increased SOB on exertion/Occasional lightheadedness).  Eye Problem     Shortness of Breath    New PCP    Red eye spontaneous and without vision disturbance, itching  Occurred  1 month ago and resolved without meds  Subconjunctival hemorrhage  Optho consult    COPD  Dyspnea at times on exertion  Start albuterol prn  Risk / benefits rev'd  Plan PFT's on follow up    H/o ischemic colitis    Grave's dse   s/p radioactive iodine  On synthroid     Hypercholesterolemia on rx no side effects  H/o A Fib s/p ablation  Cardio following    H/o hip surgery  Prophylactic antibiotic if dentist requests per pt    GERD stable on rx no side effects    Diet / exercise rev'd    Review of Systems   Respiratory:  Positive for shortness of breath.    All other systems reviewed and are negative.      Objective   /76   Pulse 66   Temp 36.9 °C (98.4 °F)   Wt 57.3 kg (126 lb 6.4 oz)   LMP  (LMP Unknown)   SpO2 93%   BMI 21.70 kg/m²   Lab Results   Component Value Date    WBC 8.1 04/12/2024    HGB 14.4 04/12/2024    HCT 44.4 04/12/2024     04/12/2024    CHOL 156 04/12/2024    TRIG 117 04/12/2024    HDL 72.6 04/12/2024    ALT 20 04/12/2024    AST 22 04/12/2024     04/12/2024    K 4.3 04/12/2024     04/12/2024    CREATININE 0.96 04/12/2024    BUN 24 (H) 04/12/2024    CO2 28 04/12/2024    TSH 2.99 04/12/2024           Physical Exam  Vitals reviewed.   Constitutional:       Appearance: Normal appearance. She is normal weight.   HENT:      Head: Normocephalic and atraumatic.      Mouth/Throat:      Pharynx: No posterior oropharyngeal erythema.   Eyes:      General: No scleral icterus.     Pupils: Pupils are equal, round, and reactive to light.       Comments: Subconjunctival hemorrhage right eye   Cardiovascular:      Rate and Rhythm: Normal rate and regular rhythm.      Heart sounds: Normal heart sounds.   Pulmonary:      Effort: No respiratory distress.      Breath sounds: No wheezing.   Abdominal:      General: Abdomen is flat. Bowel sounds are normal. There is no distension.      Palpations: Abdomen is soft. There is no mass.      Tenderness: There is no abdominal tenderness. There is no rebound.   Musculoskeletal:         General: Normal range of motion.      Cervical back: Normal range of motion and neck supple.   Skin:     General: Skin is warm and dry.   Neurological:      General: No focal deficit present.      Mental Status: She is alert and oriented to person, place, and time. Mental status is at baseline.   Psychiatric:         Mood and Affect: Mood normal.         Behavior: Behavior normal.         Thought Content: Thought content normal.         Judgment: Judgment normal.         Problem List Items Addressed This Visit             ICD-10-CM    Atrial fibrillation (Multi) I48.91    Graves disease E05.00    Relevant Orders    Referral to Ophthalmology    Hyperlipidemia E78.5    Ischemic colitis (Multi) K55.9    Chronic obstructive pulmonary disease (Multi) - Primary J44.9    Relevant Medications    albuterol 90 mcg/actuation inhaler    Protein-calorie malnutrition, unspecified severity (Multi) E46     Other Visit Diagnoses         Codes    Conjunctival hemorrhage of right eye     H11.31    Gastroesophageal reflux disease without esophagitis     K21.9    Relevant Medications    omeprazole (PriLOSEC) 20 mg DR capsule    History of hip surgery     Z98.890          Assessment/Plan     New PCP    Red eye spontaneous and without vision disturbance, itching  Occurred  1 month ago and resolved without meds  Subconjunctival hemorrhage  Optho consult    COPD  Dyspnea at times on exertion  Start albuterol prn  Risk / benefits rev'd  Plan PFT's on follow  up    H/o ischemic colitis    Grave's dse   s/p radioactive iodine  On synthroid     Hypercholesterolemia on rx no side effects  H/o A Fib s/p ablation  Cardio following    H/o hip surgery  Prophylactic antibiotic if dentist requests per pt    GERD stable on rx no side effects    Diet / exercise rev'd    follow up 2 months

## 2024-08-01 ENCOUNTER — OFFICE VISIT (OUTPATIENT)
Dept: CARDIOLOGY | Facility: CLINIC | Age: 79
End: 2024-08-01
Payer: MEDICARE

## 2024-08-01 VITALS
BODY MASS INDEX: 21.51 KG/M2 | OXYGEN SATURATION: 97 % | HEIGHT: 64 IN | WEIGHT: 126 LBS | DIASTOLIC BLOOD PRESSURE: 84 MMHG | SYSTOLIC BLOOD PRESSURE: 132 MMHG | HEART RATE: 71 BPM

## 2024-08-01 DIAGNOSIS — E78.5 HYPERLIPIDEMIA, UNSPECIFIED HYPERLIPIDEMIA TYPE: ICD-10-CM

## 2024-08-01 PROCEDURE — 99214 OFFICE O/P EST MOD 30 MIN: CPT | Performed by: NURSE PRACTITIONER

## 2024-08-01 PROCEDURE — 1036F TOBACCO NON-USER: CPT | Performed by: NURSE PRACTITIONER

## 2024-08-01 PROCEDURE — 1159F MED LIST DOCD IN RCRD: CPT | Performed by: NURSE PRACTITIONER

## 2024-08-01 PROCEDURE — 1157F ADVNC CARE PLAN IN RCRD: CPT | Performed by: NURSE PRACTITIONER

## 2024-08-01 PROCEDURE — 1160F RVW MEDS BY RX/DR IN RCRD: CPT | Performed by: NURSE PRACTITIONER

## 2024-08-01 RX ORDER — ROSUVASTATIN CALCIUM 20 MG/1
20 TABLET, COATED ORAL DAILY
Qty: 90 TABLET | Refills: 1 | Status: SHIPPED | OUTPATIENT
Start: 2024-08-01 | End: 2025-08-01

## 2024-08-01 RX ORDER — GUAIFENESIN 600 MG/1
1200 TABLET, EXTENDED RELEASE ORAL 2 TIMES DAILY
COMMUNITY

## 2024-08-01 ASSESSMENT — ENCOUNTER SYMPTOMS
DEPRESSION: 0
NEUROLOGICAL NEGATIVE: 1
MUSCULOSKELETAL NEGATIVE: 1
GASTROINTESTINAL NEGATIVE: 1
CONSTITUTIONAL NEGATIVE: 1
CARDIOVASCULAR NEGATIVE: 1
RESPIRATORY NEGATIVE: 1

## 2024-08-01 ASSESSMENT — PATIENT HEALTH QUESTIONNAIRE - PHQ9
2. FEELING DOWN, DEPRESSED OR HOPELESS: NOT AT ALL
SUM OF ALL RESPONSES TO PHQ9 QUESTIONS 1 AND 2: 0
1. LITTLE INTEREST OR PLEASURE IN DOING THINGS: NOT AT ALL

## 2024-08-01 NOTE — PROGRESS NOTES
"Chief Complaint:   Follow-up (6 Month FUV)    History Of Present Illness:    .Ms Banuelos returns in follow up.  Denies chest pain, sob, palpitations or pedal edema.  At home bp 110/60.  She will bring her cuff for calibration to next office visit.          Last Recorded Vitals:  Blood pressure 175/74, pulse 71, height 1.626 m (5' 4\"), weight 57.2 kg (126 lb), SpO2 97%.     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 11/17/2015    Iatrogenic hypothyroidism    Long term (current) use of anticoagulants 12/14/2016    Anticoagulated on warfarin    Nonscarring hair loss, unspecified 11/23/2015    Hair loss    Other specified postprocedural states 12/14/2016    Status post circumferential ablation of pulmonary vein    Other specified postprocedural states 12/16/2016    S/P ablation of atrial fibrillation    Personal history of other diseases of the circulatory system 11/17/2015    History of atrial fibrillation    Personal history of other endocrine, nutritional and metabolic disease 11/17/2015    History of Graves' disease    Personal history of other infectious and parasitic diseases 02/16/2018    History of herpes zoster    Personal history of other specified conditions 12/07/2017    History of headache    Vascular disorder of intestine, unspecified (Multi) 07/05/2017    Ischemic colitis        Past Surgical History:  Past Surgical History:   Procedure Laterality Date    ABLATION OF DYSRHYTHMIC FOCUS  2015    APPENDECTOMY  11/23/2015    Appendectomy    COLONOSCOPY  08/2023    Complete Colonoscopy    REVISION TOTAL HIP ARTHROPLASTY Left        Social History:  Social History     Socioeconomic History    Marital status:    Tobacco Use    Smoking status: Former     Types: Cigarettes    Smokeless tobacco: Never   Substance and Sexual Activity    Alcohol use: Yes     Comment: occasional    Drug use: Never    Sexual activity: Defer "     Social Determinants of Health     Financial Resource Strain: Low Risk  (10/24/2023)    Overall Financial Resource Strain (CARDIA)     Difficulty of Paying Living Expenses: Not hard at all   Transportation Needs: No Transportation Needs (11/8/2023)    OASIS : Transportation     Lack of Transportation (Medical): No     Lack of Transportation (Non-Medical): No     Patient Unable or Declines to Respond: No   Social Connections: Feeling Socially Integrated (11/8/2023)    OASIS : Social Isolation     Frequency of experiencing loneliness or isolation: Never   Housing Stability: Low Risk  (10/24/2023)    Housing Stability Vital Sign     Unable to Pay for Housing in the Last Year: No     Number of Places Lived in the Last Year: 1     Unstable Housing in the Last Year: No       Family History:  Family History   Problem Relation Name Age of Onset    Leukemia Mother      Breast cancer Mother  67    Other (cardiac disorder) Father           Allergies:  Patient has no known allergies.    Outpatient Medications:  Current Outpatient Medications   Medication Sig Dispense Refill    albuterol 90 mcg/actuation inhaler Inhale 2 puffs every 4 hours if needed for wheezing. 18 g 0    amoxicillin (Amoxil) 500 mg capsule Take 4 Tablets 1 Hour Prior to Dental Procedure or Cleaning. 4 capsule 6    aspirin 81 mg EC tablet Take 1 tablet (81 mg) by mouth once daily.      calcium carbonate-vitamin D3 500 mg-5 mcg (200 unit) tablet Take by mouth once daily. Do not start before October 24, 2023. 30 tablet 0    cholecalciferol (Vitamin D-3) 50 MCG (2000 UT) tablet Take 1 tablet (2,000 Units) by mouth once daily.      cyanocobalamin (Vitamin B-12) 500 mcg tablet Take 1 tablet (500 mcg) by mouth once daily.      flaxseed oiL 1,000 mg capsule Take 1 capsule (1,000 mg) by mouth twice a day.      folic acid (Folvite) 1 mg tablet Take by mouth twice a day.      glucosamine sulfate 500 mg tablet Take 1,000 mg by mouth once daily.       guaiFENesin (Mucinex) 600 mg 12 hr tablet Take 2 tablets (1,200 mg) by mouth 2 times a day. Do not crush, chew, or split.      levothyroxine (Synthroid, Levoxyl) 88 mcg tablet Take 1 tablet (88 mcg) by mouth once daily. 90 tablet 1    multivitamin tablet Take by mouth.      naproxen (Naprosyn) 375 mg tablet Take 1 tablet (375 mg) by mouth 2 times a day.      omeprazole (PriLOSEC) 20 mg DR capsule Take 1 capsule (20 mg) by mouth 2 times a day before meals. 180 capsule 0    polyethylene glycol (Glycolax, Miralax) 17 gram/dose powder Take 17 g by mouth once daily. Takes 1 TBSP daily      rosuvastatin (Crestor) 20 mg tablet Take 1 tablet (20 mg) by mouth once daily. 90 tablet 1    turmeric-turmeric root extract 450-50 mg capsule Take by mouth.      vit C/E/Zn/coppr/lutein/zeaxan (PRESERVISION AREDS-2 ORAL) Take 1 capsule by mouth 2 times a day. Indications: macular edema associated with non-infectious uveitis       No current facility-administered medications for this visit.        Physical Exam:  Cardiovascular:      PMI at left midclavicular line. Normal rate. Regular rhythm. Normal S1. Normal S2.       Murmurs: There is no murmur.      No gallop.  No click. No rub.   Pulses:     Intact distal pulses.   Edema:     Peripheral edema absent.         ROS:  Review of Systems   Constitutional: Negative.   Cardiovascular: Negative.    Respiratory: Negative.     Skin: Negative.    Musculoskeletal: Negative.    Gastrointestinal: Negative.    Genitourinary: Negative.    Neurological: Negative.           Last Labs:  CBC -  Lab Results   Component Value Date    WBC 8.1 04/12/2024    HGB 14.4 04/12/2024    HCT 44.4 04/12/2024    MCV 98 04/12/2024     04/12/2024       CMP -  Lab Results   Component Value Date    CALCIUM 9.1 04/12/2024    PHOS 4.3 11/15/2018    PROT 6.7 04/12/2024    ALBUMIN 4.3 04/12/2024    AST 22 04/12/2024    ALT 20 04/12/2024    ALKPHOS 53 04/12/2024    BILITOT 0.4 04/12/2024       LIPID PANEL -   Lab  "Results   Component Value Date    CHOL 156 04/12/2024    TRIG 117 04/12/2024    HDL 72.6 04/12/2024    CHHDL 2.1 04/12/2024    LDLF 72 04/17/2023    VLDL 23 04/12/2024    NHDL 83 04/12/2024       RENAL FUNCTION PANEL -   Lab Results   Component Value Date    GLUCOSE 116 (H) 04/12/2024     04/12/2024    K 4.3 04/12/2024     04/12/2024    CO2 28 04/12/2024    ANIONGAP 11 04/12/2024    BUN 24 (H) 04/12/2024    CREATININE 0.96 04/12/2024    CALCIUM 9.1 04/12/2024    PHOS 4.3 11/15/2018    ALBUMIN 4.3 04/12/2024        No results found for: \"BNP\", \"HGBA1C\"      Assessment/Plan   Problem List Items Addressed This Visit    None     Afib with hx of RFA in 2016.  Ecg done 10/2023 showed NSR.   2.  CAD.  Coronary artery calcium score of 246.53 when done 07/2022.  She had a lexiscan done 02/2023 that was negative for ischemia.   Echo 12/2022  CONCLUSIONS:   1. Left ventricular systolic function is normal with a 60-65% estimated ejection fraction.   2. Mild mitral valve regurgitation.   3. Mild tricuspid regurgitation visualized.   4. RVSP within normal limits.   5. The estimated PASP is 27 mmHg.  3.  Hyperlipidemia.    4.  Hypothyroidism.          Gianna Isaac, APRN-CNP  "

## 2024-08-19 ENCOUNTER — APPOINTMENT (OUTPATIENT)
Dept: OPHTHALMOLOGY | Facility: CLINIC | Age: 79
End: 2024-08-19
Payer: MEDICARE

## 2024-08-19 DIAGNOSIS — H40.001 GLAUCOMA SUSPECT OF RIGHT EYE: ICD-10-CM

## 2024-08-19 DIAGNOSIS — H35.372 EPIRETINAL MEMBRANE (ERM) OF LEFT EYE: ICD-10-CM

## 2024-08-19 DIAGNOSIS — H25.13 NUCLEAR SCLEROSIS OF BOTH EYES: Primary | ICD-10-CM

## 2024-08-19 DIAGNOSIS — H43.393 VITREOUS FLOATERS OF BOTH EYES: ICD-10-CM

## 2024-08-19 PROCEDURE — 92136 OPHTHALMIC BIOMETRY: CPT | Performed by: OPHTHALMOLOGY

## 2024-08-19 PROCEDURE — 92136 OPHTHALMIC BIOMETRY: CPT | Mod: BILATERAL PROCEDURE | Performed by: OPHTHALMOLOGY

## 2024-08-19 PROCEDURE — 99214 OFFICE O/P EST MOD 30 MIN: CPT | Performed by: OPHTHALMOLOGY

## 2024-08-19 PROCEDURE — 92134 CPTRZ OPH DX IMG PST SGM RTA: CPT | Performed by: OPHTHALMOLOGY

## 2024-08-19 ASSESSMENT — VISUAL ACUITY
OD_BAT_MED: 20/40-1
CORRECTION_TYPE: GLASSES
OS_CC: 20/40
METHOD: SNELLEN - LINEAR
OS_BAT_MED: 20/40+1
OD_CC: 20/40

## 2024-08-19 ASSESSMENT — ENCOUNTER SYMPTOMS
CONSTITUTIONAL NEGATIVE: 0
ENDOCRINE NEGATIVE: 0
GASTROINTESTINAL NEGATIVE: 0
EYES NEGATIVE: 0
PSYCHIATRIC NEGATIVE: 0
NEUROLOGICAL NEGATIVE: 0
HEMATOLOGIC/LYMPHATIC NEGATIVE: 0
CARDIOVASCULAR NEGATIVE: 0
RESPIRATORY NEGATIVE: 0
MUSCULOSKELETAL NEGATIVE: 0
ALLERGIC/IMMUNOLOGIC NEGATIVE: 0

## 2024-08-19 ASSESSMENT — REFRACTION_WEARINGRX
OD_ADD: +2.50
OS_AXIS: 005
OS_SPHERE: +3.00
OD_CYLINDER: +1.75
OS_ADD: +2.50
OD_AXIS: 180
OD_SPHERE: +2.50
OS_CYLINDER: +1.25

## 2024-08-19 ASSESSMENT — CUP TO DISC RATIO
OD_RATIO: 0.65
OS_RATIO: 0.45

## 2024-08-19 ASSESSMENT — CONF VISUAL FIELD
OS_SUPERIOR_TEMPORAL_RESTRICTION: 0
OS_SUPERIOR_NASAL_RESTRICTION: 0
OD_INFERIOR_NASAL_RESTRICTION: 0
OS_NORMAL: 1
OD_SUPERIOR_NASAL_RESTRICTION: 0
OD_INFERIOR_TEMPORAL_RESTRICTION: 0
OS_INFERIOR_NASAL_RESTRICTION: 0
OD_NORMAL: 1
OD_SUPERIOR_TEMPORAL_RESTRICTION: 0
OS_INFERIOR_TEMPORAL_RESTRICTION: 0

## 2024-08-19 ASSESSMENT — TONOMETRY
IOP_METHOD: GOLDMANN APPLANATION
OD_IOP_MMHG: 17
OS_IOP_MMHG: 17

## 2024-08-19 ASSESSMENT — REFRACTION_MANIFEST
OD_AXIS: 175
OD_CYLINDER: +2.00
OS_AXIS: 175
OS_ADD: +2.50
OD_ADD: +2.50
OS_CYLINDER: +2.00
OD_SPHERE: +1.25
OS_SPHERE: +2.25

## 2024-08-19 ASSESSMENT — SLIT LAMP EXAM - LIDS
COMMENTS: DERMATOCHALASIS
COMMENTS: DERMATOCHALASIS

## 2024-08-19 ASSESSMENT — EXTERNAL EXAM - RIGHT EYE: OD_EXAM: NORMAL

## 2024-08-19 ASSESSMENT — EXTERNAL EXAM - LEFT EYE: OS_EXAM: NORMAL

## 2024-08-19 NOTE — PROGRESS NOTES
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

## 2024-09-17 ENCOUNTER — APPOINTMENT (OUTPATIENT)
Dept: PRIMARY CARE | Facility: CLINIC | Age: 79
End: 2024-09-17
Payer: MEDICARE

## 2024-09-17 VITALS
BODY MASS INDEX: 21.8 KG/M2 | DIASTOLIC BLOOD PRESSURE: 70 MMHG | WEIGHT: 127 LBS | SYSTOLIC BLOOD PRESSURE: 142 MMHG | HEART RATE: 59 BPM | TEMPERATURE: 97.3 F | OXYGEN SATURATION: 99 %

## 2024-09-17 DIAGNOSIS — Z00.00 ROUTINE GENERAL MEDICAL EXAMINATION AT HEALTH CARE FACILITY: Primary | ICD-10-CM

## 2024-09-17 DIAGNOSIS — Z00.00 ENCOUNTER FOR SUBSEQUENT ANNUAL WELLNESS VISIT (AWV) IN MEDICARE PATIENT: ICD-10-CM

## 2024-09-17 DIAGNOSIS — J44.9 CHRONIC OBSTRUCTIVE PULMONARY DISEASE, UNSPECIFIED COPD TYPE (MULTI): ICD-10-CM

## 2024-09-17 DIAGNOSIS — R06.09 DYSPNEA ON EXERTION: ICD-10-CM

## 2024-09-17 DIAGNOSIS — E78.2 MIXED HYPERLIPIDEMIA: ICD-10-CM

## 2024-09-17 DIAGNOSIS — E89.0 HYPOTHYROIDISM, POSTABLATIVE: ICD-10-CM

## 2024-09-17 DIAGNOSIS — K21.9 GASTROESOPHAGEAL REFLUX DISEASE WITHOUT ESOPHAGITIS: ICD-10-CM

## 2024-09-17 DIAGNOSIS — Z71.2 ENCOUNTER TO DISCUSS TEST RESULTS: ICD-10-CM

## 2024-09-17 PROCEDURE — G0439 PPPS, SUBSEQ VISIT: HCPCS | Performed by: INTERNAL MEDICINE

## 2024-09-17 PROCEDURE — 1160F RVW MEDS BY RX/DR IN RCRD: CPT | Performed by: INTERNAL MEDICINE

## 2024-09-17 PROCEDURE — 99397 PER PM REEVAL EST PAT 65+ YR: CPT | Performed by: INTERNAL MEDICINE

## 2024-09-17 PROCEDURE — 1159F MED LIST DOCD IN RCRD: CPT | Performed by: INTERNAL MEDICINE

## 2024-09-17 PROCEDURE — 1158F ADVNC CARE PLAN TLK DOCD: CPT | Performed by: INTERNAL MEDICINE

## 2024-09-17 PROCEDURE — 1036F TOBACCO NON-USER: CPT | Performed by: INTERNAL MEDICINE

## 2024-09-17 PROCEDURE — 1123F ACP DISCUSS/DSCN MKR DOCD: CPT | Performed by: INTERNAL MEDICINE

## 2024-09-17 PROCEDURE — G0446 INTENS BEHAVE THER CARDIO DX: HCPCS | Performed by: INTERNAL MEDICINE

## 2024-09-17 PROCEDURE — 1170F FXNL STATUS ASSESSED: CPT | Performed by: INTERNAL MEDICINE

## 2024-09-17 PROCEDURE — 1157F ADVNC CARE PLAN IN RCRD: CPT | Performed by: INTERNAL MEDICINE

## 2024-09-17 PROCEDURE — 99214 OFFICE O/P EST MOD 30 MIN: CPT | Performed by: INTERNAL MEDICINE

## 2024-09-17 RX ORDER — OMEPRAZOLE 20 MG/1
20 CAPSULE, DELAYED RELEASE ORAL
Qty: 180 CAPSULE | Refills: 0 | Status: SHIPPED | OUTPATIENT
Start: 2024-09-17

## 2024-09-17 RX ORDER — ALBUTEROL SULFATE 90 UG/1
2 INHALANT RESPIRATORY (INHALATION) EVERY 4 HOURS PRN
Qty: 54 G | Refills: 0 | Status: SHIPPED | OUTPATIENT
Start: 2024-09-17 | End: 2024-12-16

## 2024-09-17 RX ORDER — LEVOTHYROXINE SODIUM 88 UG/1
88 TABLET ORAL DAILY
Qty: 90 TABLET | Refills: 0 | Status: SHIPPED | OUTPATIENT
Start: 2024-09-17 | End: 2024-12-16

## 2024-09-17 ASSESSMENT — ACTIVITIES OF DAILY LIVING (ADL)
GROCERY_SHOPPING: INDEPENDENT
DRESSING: INDEPENDENT
TAKING_MEDICATION: INDEPENDENT
DOING_HOUSEWORK: INDEPENDENT
MANAGING_FINANCES: INDEPENDENT
BATHING: INDEPENDENT

## 2024-09-17 ASSESSMENT — ENCOUNTER SYMPTOMS
DEPRESSION: 0
OCCASIONAL FEELINGS OF UNSTEADINESS: 0
LOSS OF SENSATION IN FEET: 0

## 2024-09-17 ASSESSMENT — PATIENT HEALTH QUESTIONNAIRE - PHQ9
2. FEELING DOWN, DEPRESSED OR HOPELESS: NOT AT ALL
1. LITTLE INTEREST OR PLEASURE IN DOING THINGS: NOT AT ALL
SUM OF ALL RESPONSES TO PHQ9 QUESTIONS 1 AND 2: 0

## 2024-09-18 NOTE — ASSESSMENT & PLAN NOTE
Orders:    levothyroxine (Synthroid, Levoxyl) 88 mcg tablet; Take 1 tablet (88 mcg) by mouth once daily.

## 2024-09-18 NOTE — PROGRESS NOTES
Subjective   Reason for Visit: Laura Banuelos is an 79 y.o. female here for a Medicare Wellness visit / yearly physical and follow up    Past Medical, Surgical, and Family History reviewed and updated in chart.    Reviewed all medications by prescribing practitioner or clinical pharmacist (such as prescriptions, OTCs, herbal therapies and supplements) and documented in the medical record.    HPI    Medicare wellness    Yearly physical    Mammogram 5-24  Dexa 5-24  Colonoscopy 8-23  polyps  recheck 26  CT chest lung cancer screening n/a  GYN n/a  immunizations rev'd flu  BMI 21.8    Follow up    Feels well    Labs from 4-24 rev'd    Subconjunctival hemorrhage resolved  Optho following     COPD  Dyspnea at times on exertion  Started albuterol prn  Risk / benefits rev'd  PFT's ordered     H/o ischemic colitis     Grave's dse   s/p radioactive iodine  On synthroid      Hypercholesterolemia on rx no side effects  H/o A Fib s/p ablation  Cardio following     H/o hip surgery  Prophylactic antibiotic if dentist requests per pt     GERD stable on rx no side effects     Diet / exercise rev'd    Patient Care Team:  Helena French MD as PCP - General (Internal Medicine)  Helena French MD as PCP - [employer] PCP     Review of Systems   All other systems reviewed and are negative.      Objective   Vitals:  /70   Pulse 59   Temp 36.3 °C (97.3 °F)   Wt 57.6 kg (127 lb)   LMP  (LMP Unknown)   SpO2 99%   BMI 21.80 kg/m²       Physical Exam  Vitals reviewed.   Constitutional:       Appearance: Normal appearance. She is normal weight.   HENT:      Head: Normocephalic and atraumatic.      Mouth/Throat:      Pharynx: No posterior oropharyngeal erythema.   Eyes:      General: No scleral icterus.     Conjunctiva/sclera: Conjunctivae normal.      Pupils: Pupils are equal, round, and reactive to light.   Cardiovascular:      Rate and Rhythm: Normal rate and regular rhythm.      Heart sounds: Normal  heart sounds.   Pulmonary:      Effort: No respiratory distress.      Breath sounds: No wheezing.   Abdominal:      General: Abdomen is flat. Bowel sounds are normal. There is no distension.      Palpations: Abdomen is soft. There is no mass.      Tenderness: There is no abdominal tenderness. There is no rebound.   Musculoskeletal:         General: Normal range of motion.      Cervical back: Normal range of motion and neck supple.   Skin:     General: Skin is warm and dry.   Neurological:      General: No focal deficit present.      Mental Status: She is alert and oriented to person, place, and time. Mental status is at baseline.   Psychiatric:         Mood and Affect: Mood normal.         Behavior: Behavior normal.         Thought Content: Thought content normal.         Judgment: Judgment normal.         Assessment & Plan  Routine general medical examination at health care facility         Encounter for subsequent annual wellness visit (AWV) in Medicare patient         Encounter to discuss test results          Dyspnea on exertion    Orders:    Spirometry with bronchodilator    albuterol 90 mcg/actuation inhaler; Inhale 2 puffs every 4 hours if needed for wheezing.    Chronic obstructive pulmonary disease, unspecified COPD type (Multi)         Mixed hyperlipidemia         Hypothyroidism, postablative    Orders:    levothyroxine (Synthroid, Levoxyl) 88 mcg tablet; Take 1 tablet (88 mcg) by mouth once daily.    Gastroesophageal reflux disease without esophagitis    Orders:    omeprazole (PriLOSEC) 20 mg DR capsule; Take 1 capsule (20 mg) by mouth 2 times a day before meals.    BMI 21.0-21.9, adult                    Medicare wellness    Yearly physical    Mammogram 5-24  Dexa 5-24  Colonoscopy 8-23  polyps  recheck 26  CT chest lung cancer screening n/a  GYN n/a  immunizations rev'd flu  BMI 21.8    Follow up    Feels well    Labs from 4-24 rev'd    Subconjunctival hemorrhage resolved  Optho following      COPD  Dyspnea at times on exertion  Started albuterol prn  Risk / benefits rev'd  PFT's ordered     H/o ischemic colitis     Grave's dse   s/p radioactive iodine  On synthroid      Hypercholesterolemia on rx no side effects  H/o A Fib s/p ablation  I spent 15 minutes face-to-face with this individual discussing their cardiovascular risk and behavioral therapies of nutritional choices, exercise, and elimination of habits contributing to risk. We agreed on plan how they may be able to reduce their current cardiovascular risk.  Patient 10 year cardiac risk estimate calculates :  30.3  %   Cardio following     H/o hip surgery  Prophylactic antibiotic if dentist requests per pt     GERD stable on rx no side effects     Diet / exercise rev'd    Follow up 2 months

## 2024-09-24 ENCOUNTER — HOSPITAL ENCOUNTER (OUTPATIENT)
Dept: RESPIRATORY THERAPY | Facility: HOSPITAL | Age: 79
Discharge: HOME | End: 2024-09-24
Payer: MEDICARE

## 2024-09-24 DIAGNOSIS — R06.09 DYSPNEA ON EXERTION: ICD-10-CM

## 2024-09-24 DIAGNOSIS — J44.9 CHRONIC OBSTRUCTIVE PULMONARY DISEASE, UNSPECIFIED COPD TYPE (MULTI): ICD-10-CM

## 2024-09-24 PROCEDURE — 94729 DIFFUSING CAPACITY: CPT

## 2024-09-24 PROCEDURE — 94664 DEMO&/EVAL PT USE INHALER: CPT

## 2024-09-24 PROCEDURE — 94060 EVALUATION OF WHEEZING: CPT

## 2024-09-24 PROCEDURE — 98960 EDU&TRN PT SELF-MGMT NQHP 1: CPT

## 2024-09-24 PROCEDURE — 94726 PLETHYSMOGRAPHY LUNG VOLUMES: CPT | Performed by: PEDIATRICS

## 2024-09-24 PROCEDURE — 94729 DIFFUSING CAPACITY: CPT | Performed by: PEDIATRICS

## 2024-09-24 PROCEDURE — 94060 EVALUATION OF WHEEZING: CPT | Performed by: PEDIATRICS

## 2024-09-24 PROCEDURE — 94726 PLETHYSMOGRAPHY LUNG VOLUMES: CPT

## 2024-09-24 PROCEDURE — 94760 N-INVAS EAR/PLS OXIMETRY 1: CPT

## 2024-09-25 LAB
MGC ASCENT PFT - FEV1 - POST: 1.6
MGC ASCENT PFT - FEV1 - PRE: 1.5
MGC ASCENT PFT - FEV1 - PREDICTED: 1.96
MGC ASCENT PFT - FVC - POST: 2.69
MGC ASCENT PFT - FVC - PRE: 2.72
MGC ASCENT PFT - FVC - PREDICTED: 2.57

## 2024-09-25 NOTE — RESULT ENCOUNTER NOTE
Please call the patient regarding her abnormal result.  Needs to start symbicort 160  inhaler 1 puff twice a day #1 /1  Pls order  Rinse mouth 5 min after use to prevent thrush  Use albuterol now as needed

## 2024-09-26 DIAGNOSIS — J44.9 CHRONIC OBSTRUCTIVE PULMONARY DISEASE, UNSPECIFIED COPD TYPE (MULTI): ICD-10-CM

## 2024-09-26 RX ORDER — BUDESONIDE AND FORMOTEROL FUMARATE DIHYDRATE 160; 4.5 UG/1; UG/1
1 AEROSOL RESPIRATORY (INHALATION)
Qty: 30.6 G | Refills: 0 | Status: SHIPPED | OUTPATIENT
Start: 2024-09-26 | End: 2024-11-25

## 2024-09-26 RX ORDER — BUDESONIDE AND FORMOTEROL FUMARATE DIHYDRATE 160; 4.5 UG/1; UG/1
2 AEROSOL RESPIRATORY (INHALATION)
COMMUNITY
End: 2024-09-26 | Stop reason: SDUPTHER

## 2024-10-08 ENCOUNTER — APPOINTMENT (OUTPATIENT)
Dept: PRIMARY CARE | Facility: CLINIC | Age: 79
End: 2024-10-08
Payer: MEDICARE

## 2024-11-18 ENCOUNTER — APPOINTMENT (OUTPATIENT)
Dept: PRIMARY CARE | Facility: CLINIC | Age: 79
End: 2024-11-18
Payer: MEDICARE

## 2024-11-18 VITALS
WEIGHT: 126 LBS | BODY MASS INDEX: 21.63 KG/M2 | HEART RATE: 68 BPM | OXYGEN SATURATION: 98 % | TEMPERATURE: 97.2 F | SYSTOLIC BLOOD PRESSURE: 150 MMHG | DIASTOLIC BLOOD PRESSURE: 74 MMHG

## 2024-11-18 DIAGNOSIS — J01.90 ACUTE NON-RECURRENT SINUSITIS, UNSPECIFIED LOCATION: Primary | ICD-10-CM

## 2024-11-18 DIAGNOSIS — J44.9 CHRONIC OBSTRUCTIVE PULMONARY DISEASE, UNSPECIFIED COPD TYPE (MULTI): ICD-10-CM

## 2024-11-18 DIAGNOSIS — J20.9 ACUTE BRONCHITIS, UNSPECIFIED ORGANISM: ICD-10-CM

## 2024-11-18 DIAGNOSIS — E78.2 MIXED HYPERLIPIDEMIA: ICD-10-CM

## 2024-11-18 DIAGNOSIS — K21.9 GASTROESOPHAGEAL REFLUX DISEASE WITHOUT ESOPHAGITIS: ICD-10-CM

## 2024-11-18 DIAGNOSIS — E05.00 GRAVES DISEASE: ICD-10-CM

## 2024-11-18 PROCEDURE — G2211 COMPLEX E/M VISIT ADD ON: HCPCS | Performed by: INTERNAL MEDICINE

## 2024-11-18 PROCEDURE — 99214 OFFICE O/P EST MOD 30 MIN: CPT | Performed by: INTERNAL MEDICINE

## 2024-11-18 PROCEDURE — 1160F RVW MEDS BY RX/DR IN RCRD: CPT | Performed by: INTERNAL MEDICINE

## 2024-11-18 PROCEDURE — 1159F MED LIST DOCD IN RCRD: CPT | Performed by: INTERNAL MEDICINE

## 2024-11-18 PROCEDURE — 1036F TOBACCO NON-USER: CPT | Performed by: INTERNAL MEDICINE

## 2024-11-18 RX ORDER — AMOXICILLIN AND CLAVULANATE POTASSIUM 875; 125 MG/1; MG/1
875 TABLET, FILM COATED ORAL 2 TIMES DAILY
Qty: 20 TABLET | Refills: 0 | Status: SHIPPED | OUTPATIENT
Start: 2024-11-18 | End: 2024-11-28

## 2024-11-18 RX ORDER — BENZONATATE 100 MG/1
100 CAPSULE ORAL 3 TIMES DAILY PRN
Qty: 30 CAPSULE | Refills: 0 | Status: SHIPPED | OUTPATIENT
Start: 2024-11-18 | End: 2024-11-28

## 2024-11-18 ASSESSMENT — ENCOUNTER SYMPTOMS
SORE THROAT: 1
COUGH: 1

## 2024-11-18 NOTE — PROGRESS NOTES
Subjective   Patient ID: Laura Banuelos is a 79 y.o. female who presents for Follow-up (2 month ), Cough, Sinusitis, and Sore Throat (Started Wednesday 11/13).  Cough  Associated symptoms include a sore throat.   Sinusitis  Associated symptoms include coughing and a sore throat.   Sore Throat   Associated symptoms include coughing.       Routine follow up     Sore throat, cough clear, sinus pain x X 5 days  Fever  COVID neg last pm  Acute sinusitis and bronchitis  Augmentin 875b mg bid #20 no refills  Tessalon perles as directed  Risk / benefits rev'd  Rest increase fluids     COPD  Dyspnea at times on exertion less  On albuterol prn   Symbicort improved sx  PFT's 9-24 mild no sig bronchodilator response     H/o ischemic colitis     Grave's dse   s/p radioactive iodine  On synthroid      Hypercholesterolemia on rx no side effects  H/o A Fib s/p ablation  Cardio following     H/o hip surgery  Prophylactic antibiotic if dentist requests per pt     GERD stable on rx no side effects     Diet / exercise rev'd    Review of Systems   HENT:  Positive for sore throat.    Respiratory:  Positive for cough.    All other systems reviewed and are negative.      Objective   /74   Pulse 68   Temp 36.2 °C (97.2 °F)   Wt 57.2 kg (126 lb)   LMP  (LMP Unknown)   SpO2 98%   BMI 21.63 kg/m²   Lab Results   Component Value Date    WBC 8.1 04/12/2024    HGB 14.4 04/12/2024    HCT 44.4 04/12/2024     04/12/2024    CHOL 156 04/12/2024    TRIG 117 04/12/2024    HDL 72.6 04/12/2024    ALT 20 04/12/2024    AST 22 04/12/2024     04/12/2024    K 4.3 04/12/2024     04/12/2024    CREATININE 0.96 04/12/2024    BUN 24 (H) 04/12/2024    CO2 28 04/12/2024    TSH 2.99 04/12/2024           Physical Exam  Vitals reviewed.   Constitutional:       Appearance: Normal appearance. She is normal weight.   HENT:      Head: Normocephalic and atraumatic.      Mouth/Throat:      Pharynx: No posterior oropharyngeal erythema.   Eyes:       General: No scleral icterus.     Conjunctiva/sclera: Conjunctivae normal.      Pupils: Pupils are equal, round, and reactive to light.   Cardiovascular:      Rate and Rhythm: Normal rate and regular rhythm.      Heart sounds: Normal heart sounds.   Pulmonary:      Effort: No respiratory distress.      Breath sounds: No wheezing.   Abdominal:      General: Abdomen is flat. Bowel sounds are normal. There is no distension.      Palpations: Abdomen is soft. There is no mass.      Tenderness: There is no abdominal tenderness. There is no rebound.   Musculoskeletal:         General: Normal range of motion.      Cervical back: Normal range of motion and neck supple.   Skin:     General: Skin is warm and dry.   Neurological:      General: No focal deficit present.      Mental Status: She is alert and oriented to person, place, and time. Mental status is at baseline.   Psychiatric:         Mood and Affect: Mood normal.         Behavior: Behavior normal.         Thought Content: Thought content normal.         Judgment: Judgment normal.         Problem List Items Addressed This Visit             ICD-10-CM    Graves disease E05.00    Hyperlipidemia E78.5    Chronic obstructive pulmonary disease (Multi) J44.9     Other Visit Diagnoses         Codes    Acute non-recurrent sinusitis, unspecified location    -  Primary J01.90    Relevant Medications    amoxicillin-pot clavulanate (Augmentin) 875-125 mg tablet    Acute bronchitis, unspecified organism     J20.9    Relevant Medications    amoxicillin-pot clavulanate (Augmentin) 875-125 mg tablet    benzonatate (Tessalon) 100 mg capsule    Gastroesophageal reflux disease without esophagitis     K21.9    BMI 21.0-21.9, adult     Z68.21          Assessment/Plan     Sore throat, cough clear, sinus pain x X 5 days  Fever  COVID neg last pm  Acute sinusitis and bronchitis  Augmentin 875b mg bid #20 no refills  Tessalon perles as directed  Risk / benefits rev'd  Rest increase fluids      COPD  Dyspnea at times on exertion less  On albuterol prn   Symbicort improved sx  PFT's 9-24 mild no sig bronchodilator response     H/o ischemic colitis     Grave's dse   s/p radioactive iodine  On synthroid      Hypercholesterolemia on rx no side effects  H/o A Fib s/p ablation  Cardio following     H/o hip surgery  Prophylactic antibiotic if dentist requests per pt     GERD stable on rx no side effects     Diet / exercise rev'd    Mammogram 5-24  Dexa 5-24  Colonoscopy 8-23  polyps  recheck 26  CT chest lung cancer screening n/a  GYN n/a  immunizations rev'd flu  BMI 21.6    Follow up 3 months

## 2024-12-21 DIAGNOSIS — K21.9 GASTROESOPHAGEAL REFLUX DISEASE WITHOUT ESOPHAGITIS: ICD-10-CM

## 2024-12-23 RX ORDER — OMEPRAZOLE 20 MG/1
CAPSULE, DELAYED RELEASE ORAL
Qty: 180 CAPSULE | Refills: 0 | Status: SHIPPED | OUTPATIENT
Start: 2024-12-23

## 2025-01-16 ENCOUNTER — TELEMEDICINE (OUTPATIENT)
Dept: PRIMARY CARE | Facility: CLINIC | Age: 80
End: 2025-01-16
Payer: MEDICARE

## 2025-01-16 ENCOUNTER — APPOINTMENT (OUTPATIENT)
Dept: RADIOLOGY | Facility: HOSPITAL | Age: 80
End: 2025-01-16
Payer: MEDICARE

## 2025-01-16 ENCOUNTER — HOSPITAL ENCOUNTER (OUTPATIENT)
Facility: HOSPITAL | Age: 80
Setting detail: OBSERVATION
Discharge: HOME | End: 2025-01-18
Attending: EMERGENCY MEDICINE | Admitting: INTERNAL MEDICINE
Payer: MEDICARE

## 2025-01-16 DIAGNOSIS — R50.9 FEVER, UNSPECIFIED FEVER CAUSE: Primary | ICD-10-CM

## 2025-01-16 DIAGNOSIS — R42 DIZZINESSES: ICD-10-CM

## 2025-01-16 DIAGNOSIS — J18.9 PNEUMONIA OF BOTH LUNGS DUE TO INFECTIOUS ORGANISM, UNSPECIFIED PART OF LUNG: ICD-10-CM

## 2025-01-16 DIAGNOSIS — J44.9 CHRONIC OBSTRUCTIVE PULMONARY DISEASE, UNSPECIFIED COPD TYPE (MULTI): ICD-10-CM

## 2025-01-16 DIAGNOSIS — M16.12 OSTEOARTHRITIS OF LEFT HIP, UNSPECIFIED OSTEOARTHRITIS TYPE: ICD-10-CM

## 2025-01-16 DIAGNOSIS — R09.02 HYPOXIA: ICD-10-CM

## 2025-01-16 DIAGNOSIS — I48.91 ATRIAL FIBRILLATION, UNSPECIFIED TYPE (MULTI): ICD-10-CM

## 2025-01-16 DIAGNOSIS — J47.9 BRONCHIECTASIS WITHOUT COMPLICATION (MULTI): ICD-10-CM

## 2025-01-16 PROBLEM — J15.9 COMMUNITY ACQUIRED BACTERIAL PNEUMONIA: Status: ACTIVE | Noted: 2025-01-16

## 2025-01-16 PROBLEM — J96.01 ACUTE HYPOXIC RESPIRATORY FAILURE (MULTI): Status: ACTIVE | Noted: 2025-01-16

## 2025-01-16 PROBLEM — D72.829 LEUKOCYTOSIS: Status: ACTIVE | Noted: 2025-01-16

## 2025-01-16 PROBLEM — J44.1 COPD EXACERBATION (MULTI): Status: ACTIVE | Noted: 2023-09-11

## 2025-01-16 PROBLEM — E53.8 VITAMIN B12 DEFICIENCY: Status: ACTIVE | Noted: 2025-01-16

## 2025-01-16 LAB
ALBUMIN SERPL BCP-MCNC: 3.9 G/DL (ref 3.4–5)
ALP SERPL-CCNC: 87 U/L (ref 33–136)
ALT SERPL W P-5'-P-CCNC: 12 U/L (ref 7–45)
ANION GAP SERPL CALC-SCNC: 15 MMOL/L (ref 10–20)
APPEARANCE UR: CLEAR
AST SERPL W P-5'-P-CCNC: 21 U/L (ref 9–39)
BASOPHILS # BLD AUTO: 0.06 X10*3/UL (ref 0–0.1)
BASOPHILS NFR BLD AUTO: 0.4 %
BILIRUB SERPL-MCNC: 0.5 MG/DL (ref 0–1.2)
BILIRUB UR STRIP.AUTO-MCNC: NEGATIVE MG/DL
BUN SERPL-MCNC: 16 MG/DL (ref 6–23)
CALCIUM SERPL-MCNC: 9.9 MG/DL (ref 8.6–10.3)
CHLORIDE SERPL-SCNC: 102 MMOL/L (ref 98–107)
CO2 SERPL-SCNC: 24 MMOL/L (ref 21–32)
COLOR UR: YELLOW
CREAT SERPL-MCNC: 0.83 MG/DL (ref 0.5–1.05)
EGFRCR SERPLBLD CKD-EPI 2021: 72 ML/MIN/1.73M*2
EOSINOPHIL # BLD AUTO: 0.24 X10*3/UL (ref 0–0.4)
EOSINOPHIL NFR BLD AUTO: 1.7 %
ERYTHROCYTE [DISTWIDTH] IN BLOOD BY AUTOMATED COUNT: 13.9 % (ref 11.5–14.5)
FLUAV RNA RESP QL NAA+PROBE: NOT DETECTED
FLUBV RNA RESP QL NAA+PROBE: NOT DETECTED
GLUCOSE SERPL-MCNC: 107 MG/DL (ref 74–99)
GLUCOSE UR STRIP.AUTO-MCNC: NORMAL MG/DL
HCT VFR BLD AUTO: 42.3 % (ref 36–46)
HGB BLD-MCNC: 14.1 G/DL (ref 12–16)
IMM GRANULOCYTES # BLD AUTO: 0.04 X10*3/UL (ref 0–0.5)
IMM GRANULOCYTES NFR BLD AUTO: 0.3 % (ref 0–0.9)
KETONES UR STRIP.AUTO-MCNC: NEGATIVE MG/DL
LACTATE SERPL-SCNC: 1.8 MMOL/L (ref 0.4–2)
LEUKOCYTE ESTERASE UR QL STRIP.AUTO: NEGATIVE
LYMPHOCYTES # BLD AUTO: 2.32 X10*3/UL (ref 0.8–3)
LYMPHOCYTES NFR BLD AUTO: 16.1 %
MCH RBC QN AUTO: 31.6 PG (ref 26–34)
MCHC RBC AUTO-ENTMCNC: 33.3 G/DL (ref 32–36)
MCV RBC AUTO: 95 FL (ref 80–100)
MONOCYTES # BLD AUTO: 1.2 X10*3/UL (ref 0.05–0.8)
MONOCYTES NFR BLD AUTO: 8.3 %
NEUTROPHILS # BLD AUTO: 10.53 X10*3/UL (ref 1.6–5.5)
NEUTROPHILS NFR BLD AUTO: 73.2 %
NITRITE UR QL STRIP.AUTO: NEGATIVE
NRBC BLD-RTO: 0 /100 WBCS (ref 0–0)
PH UR STRIP.AUTO: 6 [PH]
PLATELET # BLD AUTO: 291 X10*3/UL (ref 150–450)
POTASSIUM SERPL-SCNC: 3.9 MMOL/L (ref 3.5–5.3)
PROT SERPL-MCNC: 7.9 G/DL (ref 6.4–8.2)
PROT UR STRIP.AUTO-MCNC: NEGATIVE MG/DL
RBC # BLD AUTO: 4.46 X10*6/UL (ref 4–5.2)
RBC # UR STRIP.AUTO: NEGATIVE /UL
SARS-COV-2 RNA RESP QL NAA+PROBE: NOT DETECTED
SODIUM SERPL-SCNC: 137 MMOL/L (ref 136–145)
SP GR UR STRIP.AUTO: 1
UROBILINOGEN UR STRIP.AUTO-MCNC: NORMAL MG/DL
WBC # BLD AUTO: 14.4 X10*3/UL (ref 4.4–11.3)

## 2025-01-16 PROCEDURE — G2211 COMPLEX E/M VISIT ADD ON: HCPCS | Performed by: INTERNAL MEDICINE

## 2025-01-16 PROCEDURE — 2500000004 HC RX 250 GENERAL PHARMACY W/ HCPCS (ALT 636 FOR OP/ED): Performed by: EMERGENCY MEDICINE

## 2025-01-16 PROCEDURE — 36415 COLL VENOUS BLD VENIPUNCTURE: CPT | Performed by: EMERGENCY MEDICINE

## 2025-01-16 PROCEDURE — 94664 DEMO&/EVAL PT USE INHALER: CPT

## 2025-01-16 PROCEDURE — G0378 HOSPITAL OBSERVATION PER HR: HCPCS

## 2025-01-16 PROCEDURE — 1160F RVW MEDS BY RX/DR IN RCRD: CPT | Performed by: INTERNAL MEDICINE

## 2025-01-16 PROCEDURE — 96367 TX/PROPH/DG ADDL SEQ IV INF: CPT

## 2025-01-16 PROCEDURE — 83605 ASSAY OF LACTIC ACID: CPT | Performed by: EMERGENCY MEDICINE

## 2025-01-16 PROCEDURE — 2500000004 HC RX 250 GENERAL PHARMACY W/ HCPCS (ALT 636 FOR OP/ED)

## 2025-01-16 PROCEDURE — 85025 COMPLETE CBC W/AUTO DIFF WBC: CPT | Performed by: EMERGENCY MEDICINE

## 2025-01-16 PROCEDURE — 99285 EMERGENCY DEPT VISIT HI MDM: CPT | Mod: 25 | Performed by: EMERGENCY MEDICINE

## 2025-01-16 PROCEDURE — 94640 AIRWAY INHALATION TREATMENT: CPT

## 2025-01-16 PROCEDURE — 96375 TX/PRO/DX INJ NEW DRUG ADDON: CPT

## 2025-01-16 PROCEDURE — 87636 SARSCOV2 & INF A&B AMP PRB: CPT | Performed by: EMERGENCY MEDICINE

## 2025-01-16 PROCEDURE — 81003 URINALYSIS AUTO W/O SCOPE: CPT | Performed by: EMERGENCY MEDICINE

## 2025-01-16 PROCEDURE — 87075 CULTR BACTERIA EXCEPT BLOOD: CPT | Mod: GENLAB | Performed by: EMERGENCY MEDICINE

## 2025-01-16 PROCEDURE — 80053 COMPREHEN METABOLIC PANEL: CPT | Performed by: EMERGENCY MEDICINE

## 2025-01-16 PROCEDURE — 96372 THER/PROPH/DIAG INJ SC/IM: CPT

## 2025-01-16 PROCEDURE — 99214 OFFICE O/P EST MOD 30 MIN: CPT | Performed by: INTERNAL MEDICINE

## 2025-01-16 PROCEDURE — 1159F MED LIST DOCD IN RCRD: CPT | Performed by: INTERNAL MEDICINE

## 2025-01-16 PROCEDURE — 94760 N-INVAS EAR/PLS OXIMETRY 1: CPT

## 2025-01-16 PROCEDURE — 2500000002 HC RX 250 W HCPCS SELF ADMINISTERED DRUGS (ALT 637 FOR MEDICARE OP, ALT 636 FOR OP/ED): Mod: MUE | Performed by: HOSPITALIST

## 2025-01-16 PROCEDURE — 71045 X-RAY EXAM CHEST 1 VIEW: CPT | Performed by: RADIOLOGY

## 2025-01-16 PROCEDURE — 2500000002 HC RX 250 W HCPCS SELF ADMINISTERED DRUGS (ALT 637 FOR MEDICARE OP, ALT 636 FOR OP/ED): Mod: MUE | Performed by: INTERNAL MEDICINE

## 2025-01-16 PROCEDURE — 71045 X-RAY EXAM CHEST 1 VIEW: CPT

## 2025-01-16 PROCEDURE — 96365 THER/PROPH/DIAG IV INF INIT: CPT

## 2025-01-16 PROCEDURE — 99223 1ST HOSP IP/OBS HIGH 75: CPT

## 2025-01-16 PROCEDURE — 2500000005 HC RX 250 GENERAL PHARMACY W/O HCPCS: Performed by: EMERGENCY MEDICINE

## 2025-01-16 PROCEDURE — 1036F TOBACCO NON-USER: CPT | Performed by: INTERNAL MEDICINE

## 2025-01-16 PROCEDURE — 96361 HYDRATE IV INFUSION ADD-ON: CPT

## 2025-01-16 PROCEDURE — 2500000002 HC RX 250 W HCPCS SELF ADMINISTERED DRUGS (ALT 637 FOR MEDICARE OP, ALT 636 FOR OP/ED): Mod: MUE | Performed by: NURSE PRACTITIONER

## 2025-01-16 RX ORDER — GUAIFENESIN/DEXTROMETHORPHAN 100-10MG/5
5 SYRUP ORAL EVERY 4 HOURS PRN
Status: DISCONTINUED | OUTPATIENT
Start: 2025-01-16 | End: 2025-01-17

## 2025-01-16 RX ORDER — FOLIC ACID 1 MG/1
1 TABLET ORAL DAILY
Status: DISCONTINUED | OUTPATIENT
Start: 2025-01-17 | End: 2025-01-18 | Stop reason: HOSPADM

## 2025-01-16 RX ORDER — CEFTRIAXONE 2 G/50ML
2 INJECTION, SOLUTION INTRAVENOUS EVERY 24 HOURS
Status: DISCONTINUED | OUTPATIENT
Start: 2025-01-17 | End: 2025-01-18 | Stop reason: HOSPADM

## 2025-01-16 RX ORDER — ROSUVASTATIN CALCIUM 10 MG/1
20 TABLET, COATED ORAL NIGHTLY
Status: DISCONTINUED | OUTPATIENT
Start: 2025-01-16 | End: 2025-01-18 | Stop reason: HOSPADM

## 2025-01-16 RX ORDER — PANTOPRAZOLE SODIUM 40 MG/1
40 TABLET, DELAYED RELEASE ORAL
Status: DISCONTINUED | OUTPATIENT
Start: 2025-01-17 | End: 2025-01-18 | Stop reason: HOSPADM

## 2025-01-16 RX ORDER — PANTOPRAZOLE SODIUM 40 MG/10ML
40 INJECTION, POWDER, LYOPHILIZED, FOR SOLUTION INTRAVENOUS
Status: DISCONTINUED | OUTPATIENT
Start: 2025-01-17 | End: 2025-01-17

## 2025-01-16 RX ORDER — ACETAMINOPHEN 160 MG/5ML
650 SOLUTION ORAL EVERY 4 HOURS PRN
Status: DISCONTINUED | OUTPATIENT
Start: 2025-01-16 | End: 2025-01-17

## 2025-01-16 RX ORDER — IPRATROPIUM BROMIDE AND ALBUTEROL SULFATE 2.5; .5 MG/3ML; MG/3ML
3 SOLUTION RESPIRATORY (INHALATION)
Status: DISCONTINUED | OUTPATIENT
Start: 2025-01-16 | End: 2025-01-16

## 2025-01-16 RX ORDER — ENOXAPARIN SODIUM 100 MG/ML
40 INJECTION SUBCUTANEOUS EVERY 24 HOURS
Status: DISCONTINUED | OUTPATIENT
Start: 2025-01-16 | End: 2025-01-18 | Stop reason: HOSPADM

## 2025-01-16 RX ORDER — CEFTRIAXONE 2 G/50ML
2 INJECTION, SOLUTION INTRAVENOUS EVERY 24 HOURS
Status: DISCONTINUED | OUTPATIENT
Start: 2025-01-16 | End: 2025-01-17

## 2025-01-16 RX ORDER — AZITHROMYCIN MONOHYDRATE 500 MG/5ML
INJECTION, POWDER, LYOPHILIZED, FOR SOLUTION INTRAVENOUS
Status: COMPLETED
Start: 2025-01-16 | End: 2025-01-16

## 2025-01-16 RX ORDER — ONDANSETRON HYDROCHLORIDE 2 MG/ML
4 INJECTION, SOLUTION INTRAVENOUS EVERY 8 HOURS PRN
Status: DISCONTINUED | OUTPATIENT
Start: 2025-01-16 | End: 2025-01-18 | Stop reason: HOSPADM

## 2025-01-16 RX ORDER — TALC
9 POWDER (GRAM) TOPICAL NIGHTLY PRN
Status: DISCONTINUED | OUTPATIENT
Start: 2025-01-16 | End: 2025-01-18 | Stop reason: HOSPADM

## 2025-01-16 RX ORDER — PNV NO.95/FERROUS FUM/FOLIC AC 28MG-0.8MG
500 TABLET ORAL DAILY
Status: DISCONTINUED | OUTPATIENT
Start: 2025-01-17 | End: 2025-01-18 | Stop reason: HOSPADM

## 2025-01-16 RX ORDER — GUAIFENESIN 600 MG/1
600 TABLET, EXTENDED RELEASE ORAL 2 TIMES DAILY
Status: DISCONTINUED | OUTPATIENT
Start: 2025-01-16 | End: 2025-01-18 | Stop reason: HOSPADM

## 2025-01-16 RX ORDER — ACETAMINOPHEN 325 MG/1
650 TABLET ORAL EVERY 4 HOURS PRN
Status: DISCONTINUED | OUTPATIENT
Start: 2025-01-16 | End: 2025-01-18 | Stop reason: HOSPADM

## 2025-01-16 RX ORDER — LEVOTHYROXINE SODIUM 88 UG/1
88 TABLET ORAL DAILY
Status: DISCONTINUED | OUTPATIENT
Start: 2025-01-17 | End: 2025-01-16

## 2025-01-16 RX ORDER — CHOLECALCIFEROL (VITAMIN D3) 25 MCG
2000 TABLET ORAL DAILY
Status: DISCONTINUED | OUTPATIENT
Start: 2025-01-17 | End: 2025-01-18 | Stop reason: HOSPADM

## 2025-01-16 RX ORDER — ALBUTEROL SULFATE 0.83 MG/ML
2.5 SOLUTION RESPIRATORY (INHALATION) EVERY 2 HOUR PRN
Status: DISCONTINUED | OUTPATIENT
Start: 2025-01-16 | End: 2025-01-18 | Stop reason: HOSPADM

## 2025-01-16 RX ORDER — ACETAMINOPHEN 650 MG/1
650 SUPPOSITORY RECTAL EVERY 4 HOURS PRN
Status: DISCONTINUED | OUTPATIENT
Start: 2025-01-16 | End: 2025-01-17

## 2025-01-16 RX ORDER — IPRATROPIUM BROMIDE AND ALBUTEROL SULFATE 2.5; .5 MG/3ML; MG/3ML
3 SOLUTION RESPIRATORY (INHALATION) 3 TIMES DAILY
Status: DISCONTINUED | OUTPATIENT
Start: 2025-01-16 | End: 2025-01-18 | Stop reason: HOSPADM

## 2025-01-16 RX ORDER — LEVOTHYROXINE SODIUM 88 UG/1
88 TABLET ORAL NIGHTLY
Status: DISCONTINUED | OUTPATIENT
Start: 2025-01-16 | End: 2025-01-18 | Stop reason: HOSPADM

## 2025-01-16 RX ORDER — BENZONATATE 100 MG/1
CAPSULE ORAL 3 TIMES DAILY PRN
COMMUNITY

## 2025-01-16 RX ORDER — FLUTICASONE FUROATE AND VILANTEROL 200; 25 UG/1; UG/1
1 POWDER RESPIRATORY (INHALATION)
Status: DISCONTINUED | OUTPATIENT
Start: 2025-01-17 | End: 2025-01-18 | Stop reason: HOSPADM

## 2025-01-16 RX ORDER — POLYETHYLENE GLYCOL 3350 17 G/17G
17 POWDER, FOR SOLUTION ORAL DAILY PRN
Status: DISCONTINUED | OUTPATIENT
Start: 2025-01-16 | End: 2025-01-18 | Stop reason: HOSPADM

## 2025-01-16 RX ORDER — ONDANSETRON 4 MG/1
4 TABLET, FILM COATED ORAL EVERY 8 HOURS PRN
Status: DISCONTINUED | OUTPATIENT
Start: 2025-01-16 | End: 2025-01-18 | Stop reason: HOSPADM

## 2025-01-16 RX ORDER — BENZONATATE 100 MG/1
100 CAPSULE ORAL 3 TIMES DAILY PRN
Status: DISCONTINUED | OUTPATIENT
Start: 2025-01-16 | End: 2025-01-18 | Stop reason: HOSPADM

## 2025-01-16 RX ORDER — ASPIRIN 81 MG/1
81 TABLET ORAL DAILY
Status: DISCONTINUED | OUTPATIENT
Start: 2025-01-16 | End: 2025-01-18 | Stop reason: HOSPADM

## 2025-01-16 RX ADMIN — ENOXAPARIN SODIUM 40 MG: 40 INJECTION SUBCUTANEOUS at 16:46

## 2025-01-16 RX ADMIN — AZITHROMYCIN 500 MG: 500 INJECTION, POWDER, LYOPHILIZED, FOR SOLUTION INTRAVENOUS at 16:53

## 2025-01-16 RX ADMIN — GUAIFENESIN 600 MG: 600 TABLET, EXTENDED RELEASE ORAL at 21:12

## 2025-01-16 RX ADMIN — Medication 2.5 L/MIN: at 21:46

## 2025-01-16 RX ADMIN — SODIUM CHLORIDE, POTASSIUM CHLORIDE, SODIUM LACTATE AND CALCIUM CHLORIDE 1000 ML: 600; 310; 30; 20 INJECTION, SOLUTION INTRAVENOUS at 14:50

## 2025-01-16 RX ADMIN — ROSUVASTATIN CALCIUM 20 MG: 10 TABLET, FILM COATED ORAL at 21:12

## 2025-01-16 RX ADMIN — CEFTRIAXONE 2 G: 2 INJECTION, SOLUTION INTRAVENOUS at 15:56

## 2025-01-16 RX ADMIN — METHYLPREDNISOLONE SODIUM SUCCINATE 40 MG: 40 INJECTION, POWDER, FOR SOLUTION INTRAMUSCULAR; INTRAVENOUS at 16:46

## 2025-01-16 RX ADMIN — LEVOTHYROXINE SODIUM 88 MCG: 88 TABLET ORAL at 21:12

## 2025-01-16 RX ADMIN — IPRATROPIUM BROMIDE AND ALBUTEROL SULFATE 3 ML: .5; 3 SOLUTION RESPIRATORY (INHALATION) at 21:46

## 2025-01-16 RX ADMIN — Medication 2 L/MIN: at 16:00

## 2025-01-16 SDOH — SOCIAL STABILITY: SOCIAL INSECURITY: DO YOU FEEL ANYONE HAS EXPLOITED OR TAKEN ADVANTAGE OF YOU FINANCIALLY OR OF YOUR PERSONAL PROPERTY?: NO

## 2025-01-16 SDOH — ECONOMIC STABILITY: INCOME INSECURITY: IN THE PAST 12 MONTHS HAS THE ELECTRIC, GAS, OIL, OR WATER COMPANY THREATENED TO SHUT OFF SERVICES IN YOUR HOME?: NO

## 2025-01-16 SDOH — SOCIAL STABILITY: SOCIAL INSECURITY: DO YOU FEEL UNSAFE GOING BACK TO THE PLACE WHERE YOU ARE LIVING?: NO

## 2025-01-16 SDOH — SOCIAL STABILITY: SOCIAL INSECURITY
WITHIN THE LAST YEAR, HAVE YOU BEEN KICKED, HIT, SLAPPED, OR OTHERWISE PHYSICALLY HURT BY YOUR PARTNER OR EX-PARTNER?: NO

## 2025-01-16 SDOH — SOCIAL STABILITY: SOCIAL INSECURITY: WITHIN THE LAST YEAR, HAVE YOU BEEN HUMILIATED OR EMOTIONALLY ABUSED IN OTHER WAYS BY YOUR PARTNER OR EX-PARTNER?: NO

## 2025-01-16 SDOH — SOCIAL STABILITY: SOCIAL INSECURITY
WITHIN THE LAST YEAR, HAVE YOU BEEN RAPED OR FORCED TO HAVE ANY KIND OF SEXUAL ACTIVITY BY YOUR PARTNER OR EX-PARTNER?: NO

## 2025-01-16 SDOH — SOCIAL STABILITY: SOCIAL INSECURITY: ARE THERE ANY APPARENT SIGNS OF INJURIES/BEHAVIORS THAT COULD BE RELATED TO ABUSE/NEGLECT?: NO

## 2025-01-16 SDOH — SOCIAL STABILITY: SOCIAL INSECURITY: HAVE YOU HAD THOUGHTS OF HARMING ANYONE ELSE?: NO

## 2025-01-16 SDOH — SOCIAL STABILITY: SOCIAL INSECURITY: ARE YOU OR HAVE YOU BEEN THREATENED OR ABUSED PHYSICALLY, EMOTIONALLY, OR SEXUALLY BY ANYONE?: NO

## 2025-01-16 SDOH — SOCIAL STABILITY: SOCIAL INSECURITY: HAVE YOU HAD ANY THOUGHTS OF HARMING ANYONE ELSE?: NO

## 2025-01-16 SDOH — ECONOMIC STABILITY: FOOD INSECURITY: WITHIN THE PAST 12 MONTHS, YOU WORRIED THAT YOUR FOOD WOULD RUN OUT BEFORE YOU GOT THE MONEY TO BUY MORE.: NEVER TRUE

## 2025-01-16 SDOH — SOCIAL STABILITY: SOCIAL INSECURITY: DOES ANYONE TRY TO KEEP YOU FROM HAVING/CONTACTING OTHER FRIENDS OR DOING THINGS OUTSIDE YOUR HOME?: NO

## 2025-01-16 SDOH — SOCIAL STABILITY: SOCIAL INSECURITY: HAS ANYONE EVER THREATENED TO HURT YOUR FAMILY OR YOUR PETS?: NO

## 2025-01-16 SDOH — ECONOMIC STABILITY: FOOD INSECURITY: WITHIN THE PAST 12 MONTHS, THE FOOD YOU BOUGHT JUST DIDN'T LAST AND YOU DIDN'T HAVE MONEY TO GET MORE.: NEVER TRUE

## 2025-01-16 SDOH — SOCIAL STABILITY: SOCIAL INSECURITY: WERE YOU ABLE TO COMPLETE ALL THE BEHAVIORAL HEALTH SCREENINGS?: YES

## 2025-01-16 SDOH — SOCIAL STABILITY: SOCIAL INSECURITY: ABUSE: ADULT

## 2025-01-16 SDOH — SOCIAL STABILITY: SOCIAL INSECURITY: WITHIN THE LAST YEAR, HAVE YOU BEEN AFRAID OF YOUR PARTNER OR EX-PARTNER?: NO

## 2025-01-16 ASSESSMENT — LIFESTYLE VARIABLES
HOW OFTEN DURING THE LAST YEAR HAVE YOU NEEDED AN ALCOHOLIC DRINK FIRST THING IN THE MORNING TO GET YOURSELF GOING AFTER A NIGHT OF HEAVY DRINKING: NEVER
HOW OFTEN DURING THE LAST YEAR HAVE YOU FAILED TO DO WHAT WAS NORMALLY EXPECTED FROM YOU BECAUSE OF DRINKING: NEVER
HOW OFTEN DURING THE LAST YEAR HAVE YOU FOUND THAT YOU WERE NOT ABLE TO STOP DRINKING ONCE YOU HAD STARTED: NEVER
HAVE YOU OR SOMEONE ELSE BEEN INJURED AS A RESULT OF YOUR DRINKING: NO
AUDIT TOTAL SCORE: 0
PRESCIPTION_ABUSE_PAST_12_MONTHS: NO
HOW MANY STANDARD DRINKS CONTAINING ALCOHOL DO YOU HAVE ON A TYPICAL DAY: 1 OR 2
HOW OFTEN DO YOU HAVE A DRINK CONTAINING ALCOHOL: 4 OR MORE TIMES A WEEK
HOW OFTEN DURING THE LAST YEAR HAVE YOU HAD A FEELING OF GUILT OR REMORSE AFTER DRINKING: NEVER
SUBSTANCE_ABUSE_PAST_12_MONTHS: NO
AUDIT-C TOTAL SCORE: 4
HOW OFTEN DO YOU HAVE 6 OR MORE DRINKS ON ONE OCCASION: NEVER
HOW OFTEN DURING THE LAST YEAR HAVE YOU BEEN UNABLE TO REMEMBER WHAT HAPPENED THE NIGHT BEFORE BECAUSE YOU HAD BEEN DRINKING: NEVER
HAS A RELATIVE, FRIEND, DOCTOR, OR ANOTHER HEALTH PROFESSIONAL EXPRESSED CONCERN ABOUT YOUR DRINKING OR SUGGESTED YOU CUT DOWN: NO
AUDIT TOTAL SCORE: 4
AUDIT-C TOTAL SCORE: 4
SKIP TO QUESTIONS 9-10: 1

## 2025-01-16 ASSESSMENT — ENCOUNTER SYMPTOMS
ENDOCRINE NEGATIVE: 1
FEVER: 1
SHORTNESS OF BREATH: 1
APPETITE CHANGE: 1
GASTROINTESTINAL NEGATIVE: 1
PSYCHIATRIC NEGATIVE: 1
FEVER: 1
HEMATOLOGIC/LYMPHATIC NEGATIVE: 1
CARDIOVASCULAR NEGATIVE: 1
ALLERGIC/IMMUNOLOGIC NEGATIVE: 1
NEUROLOGICAL NEGATIVE: 1
MUSCULOSKELETAL NEGATIVE: 1
EYES NEGATIVE: 1
DIZZINESS: 1

## 2025-01-16 ASSESSMENT — COLUMBIA-SUICIDE SEVERITY RATING SCALE - C-SSRS
6. HAVE YOU EVER DONE ANYTHING, STARTED TO DO ANYTHING, OR PREPARED TO DO ANYTHING TO END YOUR LIFE?: NO
1. IN THE PAST MONTH, HAVE YOU WISHED YOU WERE DEAD OR WISHED YOU COULD GO TO SLEEP AND NOT WAKE UP?: NO
2. HAVE YOU ACTUALLY HAD ANY THOUGHTS OF KILLING YOURSELF?: NO

## 2025-01-16 ASSESSMENT — COGNITIVE AND FUNCTIONAL STATUS - GENERAL
PATIENT BASELINE BEDBOUND: NO
MOBILITY SCORE: 24
DAILY ACTIVITIY SCORE: 24
DAILY ACTIVITIY SCORE: 24
MOBILITY SCORE: 24

## 2025-01-16 ASSESSMENT — ACTIVITIES OF DAILY LIVING (ADL)
JUDGMENT_ADEQUATE_SAFELY_COMPLETE_DAILY_ACTIVITIES: YES
HEARING - RIGHT EAR: HEARING AID
BATHING: INDEPENDENT
TOILETING: INDEPENDENT
DRESSING YOURSELF: INDEPENDENT
FEEDING YOURSELF: INDEPENDENT
WALKS IN HOME: INDEPENDENT
HEARING - LEFT EAR: HEARING AID
PATIENT'S MEMORY ADEQUATE TO SAFELY COMPLETE DAILY ACTIVITIES?: YES
GROOMING: INDEPENDENT
LACK_OF_TRANSPORTATION: NO
ADEQUATE_TO_COMPLETE_ADL: YES

## 2025-01-16 ASSESSMENT — PATIENT HEALTH QUESTIONNAIRE - PHQ9
SUM OF ALL RESPONSES TO PHQ9 QUESTIONS 1 & 2: 0
1. LITTLE INTEREST OR PLEASURE IN DOING THINGS: NOT AT ALL
2. FEELING DOWN, DEPRESSED OR HOPELESS: NOT AT ALL

## 2025-01-16 ASSESSMENT — PAIN SCALES - GENERAL
PAINLEVEL_OUTOF10: 0 - NO PAIN
PAINLEVEL_OUTOF10: 0 - NO PAIN

## 2025-01-16 ASSESSMENT — PAIN - FUNCTIONAL ASSESSMENT: PAIN_FUNCTIONAL_ASSESSMENT: 0-10

## 2025-01-16 NOTE — H&P
History Of Present Illness  Laura Banuelos is a 79 y.o. female presenting with fever. Pt states that on Monday, she developed a fever and fatigue. She says that she also developed some shortness of breath on Tuesday. She has a chronic dry cough that she says has not changed. She had a virtual appt with her PCP today who advised her to come to the ED. ED workup revealed pneumonia and hypoxia at 82% on RA. She was admitted to med surg for further treatment. Upon examination, pt is sitting up in bed with no distress. She was placed on 2L acutely. Discussed plan of care, pt is agreeable.     ED VS: T36.7, HR 76, RR 18, /78, Sp02 93%RA    Imaging: CXR- Diffuse interstitial infiltrates bilaterally, as above. Clinical  correlation and continued follow-up until clearing is recommended.    Labs: Glu 107, Na 137, K 3.9, Bun/creat 16/0.83, Lactate 1.8, WBC 14.4, H/H 14.1/42.3, Plt 291, covid/flu neg, UA neg      Past Medical History  Past Medical History:   Diagnosis Date    Hypothyroidism due to medicaments and other exogenous substances 11/17/2015    Iatrogenic hypothyroidism    Long term (current) use of anticoagulants 12/14/2016    Anticoagulated on warfarin    Nonscarring hair loss, unspecified 11/23/2015    Hair loss    Other specified postprocedural states 12/14/2016    Status post circumferential ablation of pulmonary vein    Other specified postprocedural states 12/16/2016    S/P ablation of atrial fibrillation    Personal history of other diseases of the circulatory system 11/17/2015    History of atrial fibrillation    Personal history of other endocrine, nutritional and metabolic disease 11/17/2015    History of Graves' disease    Personal history of other infectious and parasitic diseases 02/16/2018    History of herpes zoster    Personal history of other specified conditions 12/07/2017    History of headache    Vascular disorder of intestine, unspecified 07/05/2017    Ischemic colitis       Surgical  History  Past Surgical History:   Procedure Laterality Date    ABLATION OF DYSRHYTHMIC FOCUS  2015    APPENDECTOMY  11/23/2015    Appendectomy    COLONOSCOPY  08/2023    Complete Colonoscopy    REVISION TOTAL HIP ARTHROPLASTY Left         Social History  She reports that she has quit smoking. Her smoking use included cigarettes. She has never used smokeless tobacco. She reports current alcohol use. She reports that she does not use drugs.    Family History  Family History   Problem Relation Name Age of Onset    Leukemia Mother      Breast cancer Mother  67    Other (cardiac disorder) Father          Allergies  Patient has no known allergies.    Review of Systems   Constitutional:  Positive for appetite change and fever.   HENT: Negative.     Eyes: Negative.    Respiratory:  Positive for shortness of breath.    Cardiovascular: Negative.    Gastrointestinal: Negative.    Endocrine: Negative.    Genitourinary: Negative.    Musculoskeletal: Negative.    Skin: Negative.    Allergic/Immunologic: Negative.    Neurological: Negative.    Hematological: Negative.    Psychiatric/Behavioral: Negative.          Physical Exam  Vitals reviewed.   HENT:      Head: Normocephalic and atraumatic.      Right Ear: External ear normal.      Left Ear: External ear normal.      Nose: Nose normal.      Mouth/Throat:      Pharynx: Oropharynx is clear.   Eyes:      Conjunctiva/sclera: Conjunctivae normal.   Cardiovascular:      Rate and Rhythm: Normal rate and regular rhythm.      Pulses: Normal pulses.      Heart sounds: Normal heart sounds.   Pulmonary:      Comments: Crackles   Abdominal:      General: Bowel sounds are normal.      Palpations: Abdomen is soft.   Musculoskeletal:         General: Normal range of motion.      Cervical back: Normal range of motion and neck supple.   Skin:     General: Skin is dry.   Neurological:      General: No focal deficit present.      Mental Status: She is alert and oriented to person, place, and time.  "  Psychiatric:         Mood and Affect: Mood normal.         Behavior: Behavior normal.          Last Recorded Vitals  Blood pressure 150/78, pulse 76, temperature 36.7 °C (98 °F), temperature source Oral, resp. rate 18, height 1.626 m (5' 4\"), weight 54.4 kg (120 lb), SpO2 (!) 88%.    Relevant Results  Results for orders placed or performed during the hospital encounter of 01/16/25 (from the past 24 hours)   Sars-CoV-2 and Influenza A/B PCR   Result Value Ref Range    Flu A Result Not Detected Not Detected    Flu B Result Not Detected Not Detected    Coronavirus 2019, PCR Not Detected Not Detected   CBC and Auto Differential   Result Value Ref Range    WBC 14.4 (H) 4.4 - 11.3 x10*3/uL    nRBC 0.0 0.0 - 0.0 /100 WBCs    RBC 4.46 4.00 - 5.20 x10*6/uL    Hemoglobin 14.1 12.0 - 16.0 g/dL    Hematocrit 42.3 36.0 - 46.0 %    MCV 95 80 - 100 fL    MCH 31.6 26.0 - 34.0 pg    MCHC 33.3 32.0 - 36.0 g/dL    RDW 13.9 11.5 - 14.5 %    Platelets 291 150 - 450 x10*3/uL    Neutrophils % 73.2 40.0 - 80.0 %    Immature Granulocytes %, Automated 0.3 0.0 - 0.9 %    Lymphocytes % 16.1 13.0 - 44.0 %    Monocytes % 8.3 2.0 - 10.0 %    Eosinophils % 1.7 0.0 - 6.0 %    Basophils % 0.4 0.0 - 2.0 %    Neutrophils Absolute 10.53 (H) 1.60 - 5.50 x10*3/uL    Immature Granulocytes Absolute, Automated 0.04 0.00 - 0.50 x10*3/uL    Lymphocytes Absolute 2.32 0.80 - 3.00 x10*3/uL    Monocytes Absolute 1.20 (H) 0.05 - 0.80 x10*3/uL    Eosinophils Absolute 0.24 0.00 - 0.40 x10*3/uL    Basophils Absolute 0.06 0.00 - 0.10 x10*3/uL   Comprehensive Metabolic Panel   Result Value Ref Range    Glucose 107 (H) 74 - 99 mg/dL    Sodium 137 136 - 145 mmol/L    Potassium 3.9 3.5 - 5.3 mmol/L    Chloride 102 98 - 107 mmol/L    Bicarbonate 24 21 - 32 mmol/L    Anion Gap 15 10 - 20 mmol/L    Urea Nitrogen 16 6 - 23 mg/dL    Creatinine 0.83 0.50 - 1.05 mg/dL    eGFR 72 >60 mL/min/1.73m*2    Calcium 9.9 8.6 - 10.3 mg/dL    Albumin 3.9 3.4 - 5.0 g/dL    Alkaline " Phosphatase 87 33 - 136 U/L    Total Protein 7.9 6.4 - 8.2 g/dL    AST 21 9 - 39 U/L    Bilirubin, Total 0.5 0.0 - 1.2 mg/dL    ALT 12 7 - 45 U/L   Lactate   Result Value Ref Range    Lactate 1.8 0.4 - 2.0 mmol/L   Urinalysis with Reflex Culture and Microscopic   Result Value Ref Range    Color, Urine Yellow Light-Yellow, Yellow, Dark-Yellow    Appearance, Urine Clear Clear    Specific Gravity, Urine 1.005 1.005 - 1.035    pH, Urine 6.0 5.0, 5.5, 6.0, 6.5, 7.0, 7.5, 8.0    Protein, Urine NEGATIVE NEGATIVE, 10 (TRACE), 20 (TRACE) mg/dL    Glucose, Urine Normal Normal mg/dL    Blood, Urine NEGATIVE NEGATIVE    Ketones, Urine NEGATIVE NEGATIVE mg/dL    Bilirubin, Urine NEGATIVE NEGATIVE    Urobilinogen, Urine Normal Normal mg/dL    Nitrite, Urine NEGATIVE NEGATIVE    Leukocyte Esterase, Urine NEGATIVE NEGATIVE     *Note: Due to a large number of results and/or encounters for the requested time period, some results have not been displayed. A complete set of results can be found in Results Review.        Assessment/Plan   Assessment & Plan  Community acquired bacterial pneumonia    COPD exacerbation (Multi)    Acute hypoxic respiratory failure (Multi)    Leukocytosis    Graves disease    Atrial fibrillation (Multi)    GERD without esophagitis    Vitamin D insufficiency    Vitamin B12 deficiency      #Community acquired pneumonia  #COPD exacerbation  #Acute hypoxic respiratory failure   #Leukocytosis   -CXR: Diffuse interstitial infiltrates bilaterally, as above. Clinical  correlation and continued follow-up until clearing is recommended.  -WBC 14.4   -Lactate 1.8  -Procal pending  -Sputum cx if able   -Supplemental oxygen to maintain an sp02 greater than 92%  -Currently on 2L   -Baseline RA  -IV azithro, ceftriaxone (Day1)  -Bronchodilators  -Solumedrol q8   -Mucinex BID     #Graves Disease  -Continue levothyroxine     #Atrial fibrillation s/p ablation   #HLD  -Continue ASA, rosuvastatin     #GERD  -Continue pantoprazole  sub for omeprazole     #Vit B12 Deficiency  #Vit D Deficiency   -Continue cholecalciferol, cyanocobalamin     DVT ppx  -lovenox    PUD ppx  -pantoprazole    F: PRN  E: Replete per protocol  N: Regular   A: PIV    Disposition: Pt requires less than 2 inpatient days at this time   Code Status: Full Code      Attending Attestation:    Patient was seen and examined face to face, history and physical was taken personally at bedside the APRN-CNP, was present for the whole duration of the exam who participated in the documentation of this note. I performed the medical decision-making components (assessment and plan of care). I have reviewed the documentation and verified the findings in the note as written with additions or exceptions as stated in the body of this note.   Elderly lady very healthy, has been feeling weak and not well for a week maybe some fever, came to emergency room, workup in ER showed some leukocytosis of 14.4, and this chest x-ray showed pneumonia.  Patient has history of COPD but she is not on home oxygen she required O2.  On exam she is laying in bed on oxygen in no respiratory distress at this point, complaining of shortness of breath, no chest pain no nausea vomiting diarrhea.  On exam there are crackles lungs bilaterally, heart is regular abdomen soft bowel sounds are present extremity no edema.  Patient with hypoxemic respiratory failure secondary to COPD exacerbation, pneumonia, we will treat for community-acquired pneumonia, Solu-Medrol and bronchodilator.    Dr. Ajit Roca MD  Internal Medicine   Adrianne Toure, APRVIRGIN[place]-CNP

## 2025-01-16 NOTE — ED TRIAGE NOTES
Monday night pt started having fever and chills, mild abd pain since yesterday; fevers reoccurring despite antipyretic use. Pt is very fatigued and has chronic cough.

## 2025-01-16 NOTE — ED PROVIDER NOTES
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 other exogenous substances 11/17/2015    Iatrogenic hypothyroidism    Long term (current) use of anticoagulants 12/14/2016    Anticoagulated on warfarin    Nonscarring hair loss, unspecified 11/23/2015    Hair loss    Other specified postprocedural states 12/14/2016    Status post circumferential ablation of pulmonary vein    Other specified postprocedural states 12/16/2016    S/P ablation of atrial fibrillation    Personal history of other diseases of the circulatory system 11/17/2015    History of atrial fibrillation    Personal history of other endocrine, nutritional and metabolic disease 11/17/2015    History of Graves' disease    Personal history of other infectious and parasitic diseases 02/16/2018    History of herpes zoster    Personal history of other specified conditions 12/07/2017    History of headache    Vascular disorder of intestine, unspecified 07/05/2017    Ischemic colitis     Past Surgical History:   Procedure Laterality Date    ABLATION OF DYSRHYTHMIC FOCUS  2015    APPENDECTOMY  11/23/2015    Appendectomy    COLONOSCOPY  08/2023    Complete Colonoscopy    REVISION TOTAL HIP ARTHROPLASTY Left      Family History   Problem Relation Name Age of Onset    Leukemia Mother      Breast cancer Mother  67    Other (cardiac disorder) Father       Social History     Tobacco Use    Smoking status: Former     Types: Cigarettes    Smokeless tobacco: Never   Vaping Use    Vaping status: Never Used   Substance Use Topics    Alcohol use: Yes     Comment: occasional    Drug use: Never       Physical Exam   ED Triage Vitals [01/16/25 1417]   Temperature Heart Rate Respirations BP   36.7 °C (98 °F) 76 18 150/78      Pulse Ox Temp Source Heart  Rate Source Patient Position   (!) 88 % Oral -- --      BP Location FiO2 (%)     -- --       Physical Exam  Constitutional:       General: She is not in acute distress.     Appearance: Normal appearance. She is not toxic-appearing.   HENT:      Head: Normocephalic and atraumatic.      Right Ear: Tympanic membrane normal.      Left Ear: Tympanic membrane normal.      Mouth/Throat:      Mouth: Mucous membranes are moist.      Pharynx: Oropharynx is clear.   Eyes:      Conjunctiva/sclera: Conjunctivae normal.      Pupils: Pupils are equal, round, and reactive to light.   Cardiovascular:      Rate and Rhythm: Normal rate and regular rhythm.      Pulses: Normal pulses.      Heart sounds: Normal heart sounds.   Pulmonary:      Effort: Pulmonary effort is normal. No respiratory distress.      Breath sounds: Rhonchi present. No wheezing.   Abdominal:      General: Bowel sounds are normal.      Palpations: Abdomen is soft.      Tenderness: There is no abdominal tenderness. There is no guarding or rebound.   Musculoskeletal:         General: Normal range of motion.      Cervical back: Normal range of motion.   Skin:     General: Skin is warm and dry.   Neurological:      General: No focal deficit present.      Mental Status: She is alert and oriented to person, place, and time.           ED Course & MDM   ED Course as of 01/16/25 1604   Thu Jan 16, 2025   1503 EKG performed at 1503 showing normal sinus rhythm no ST elevation or depression no indication of a STEMI at this time ventricular rate is 74 interpreted by me. [KA]   1550 Very pleasant 79-year-old female results came back showing pneumonia.  Her pulse ox is low.  However it would go up to 93% on room air then dipped back down.  At that point decided to do a walking pulse ox.  I did do a walk myself around the department.  Unfortunately patient went down to 82% on room air she felt lightheaded we were able to go back to the room put her on oxygen plan is to admit her to  the hospital for further evaluation and treatment. [KA]      ED Course User Index  [KA] Etienne Bartholomew DO         Diagnoses as of 01/16/25 1604   Fever, unspecified fever cause   Pneumonia of both lungs due to infectious organism, unspecified part of lung   Hypoxia                 No data recorded     Guevara Coma Scale Score: 15 (01/16/25 1454 : Brandee Mckeon, ORTIZ)                           Medical Decision Making  Very pleasant 79-year-old female presents to the ER with chief complaint of shortness of breath slight fever and generalized fatigue.  Initially sounded the patient had influenza.  However he did do a full workup including imaging and blood work.  Imaging came back as pneumonia.  Patient did have slightly low oxygen levels.  We did do a walking pulse ox which did show 82%.  At that point I did not feel comfortable discharging the patient home being that she is so hypoxic.  Started on oxygen and started on antibiotics.  Patient was admitted to the hospital for further evaluation treatment.        Procedure  Procedures     Etienne Bartholomew DO  01/16/25 1605

## 2025-01-16 NOTE — PROGRESS NOTES
Subjective   Patient ID: Laura Banuelos is a 79 y.o. female who presents for Fever and Dizziness (Lightheaded ).  Fever     Dizziness  Associated symptoms include a fever.     Tele health    Same day sick    Fever, dizziness worsening  x 3 days  chronic  cough,  Denies chest congestion, CP  To ER now     COPD / bronchiectasis  Dyspnea at times on exertion  on albuterol prn  PFT's ordered     H/o ischemic colitis     Grave's dse   s/p radioactive iodine  On synthroid      Hypercholesterolemia on rx no side effects  H/o A Fib s/p ablation  Cardio following     H/o hip surgery  Prophylactic antibiotic if dentist requests per pt     GERD stable on rx no side effects     Diet / exercise rev'd    Review of Systems   Constitutional:  Positive for fever.   Neurological:  Positive for dizziness.   All other systems reviewed and are negative.      Objective   LMP  (LMP Unknown)   Lab Results   Component Value Date    WBC 8.1 04/12/2024    HGB 14.4 04/12/2024    HCT 44.4 04/12/2024     04/12/2024    CHOL 156 04/12/2024    TRIG 117 04/12/2024    HDL 72.6 04/12/2024    ALT 20 04/12/2024    AST 22 04/12/2024     04/12/2024    K 4.3 04/12/2024     04/12/2024    CREATININE 0.96 04/12/2024    BUN 24 (H) 04/12/2024    CO2 28 04/12/2024    TSH 2.99 04/12/2024           Physical Exam  Constitutional:       Appearance: She is normal weight. She is ill-appearing.   Pulmonary:      Effort: Pulmonary effort is normal.   Neurological:      Mental Status: She is alert.   Psychiatric:         Mood and Affect: Mood normal.         Problem List Items Addressed This Visit             ICD-10-CM    Atrial fibrillation (Multi) I48.91    Bronchiectasis J47.9     Other Visit Diagnoses         Codes    Fever, unspecified fever cause    -  Primary R50.9    Dizzinesses     R42    BMI 21.0-21.9, adult     Z68.21          Assessment/Plan     Tele health    Same day sick    Fever, dizziness worsening  x 3 days  chronic  cough,  Denies  chest congestion, CP  To ER now     COPD / bronchiectasis  Dyspnea at times on exertion  on albuterol prn  PFT's ordered     H/o ischemic colitis     Grave's dse   s/p radioactive iodine  On synthroid      Hypercholesterolemia on rx no side effects  H/o A Fib s/p ablation  Cardio following     H/o hip surgery  Prophylactic antibiotic if dentist requests per pt     GERD stable on rx no side effects     Diet / exercise rev'd    Mammogram 5-24  Dexa 5-24  Colonoscopy 8-23  polyps  recheck 26  CT chest lung cancer screening n/a  GYN n/a  immunizations rev'd flu  BMI 21.8    Follow up pending

## 2025-01-17 ENCOUNTER — APPOINTMENT (OUTPATIENT)
Dept: CARDIOLOGY | Facility: HOSPITAL | Age: 80
End: 2025-01-17
Payer: MEDICARE

## 2025-01-17 LAB
ANION GAP SERPL CALC-SCNC: 15 MMOL/L (ref 10–20)
ATRIAL RATE: 74 BPM
BUN SERPL-MCNC: 12 MG/DL (ref 6–23)
CALCIUM SERPL-MCNC: 9.5 MG/DL (ref 8.6–10.3)
CHLORIDE SERPL-SCNC: 106 MMOL/L (ref 98–107)
CO2 SERPL-SCNC: 24 MMOL/L (ref 21–32)
CREAT SERPL-MCNC: 0.65 MG/DL (ref 0.5–1.05)
EGFRCR SERPLBLD CKD-EPI 2021: 90 ML/MIN/1.73M*2
ERYTHROCYTE [DISTWIDTH] IN BLOOD BY AUTOMATED COUNT: 13.7 % (ref 11.5–14.5)
GLUCOSE SERPL-MCNC: 164 MG/DL (ref 74–99)
HCT VFR BLD AUTO: 40.4 % (ref 36–46)
HGB BLD-MCNC: 13.3 G/DL (ref 12–16)
HOLD SPECIMEN: NORMAL
MCH RBC QN AUTO: 30.9 PG (ref 26–34)
MCHC RBC AUTO-ENTMCNC: 32.9 G/DL (ref 32–36)
MCV RBC AUTO: 94 FL (ref 80–100)
NRBC BLD-RTO: 0 /100 WBCS (ref 0–0)
P AXIS: 47 DEGREES
P OFFSET: 199 MS
P ONSET: 137 MS
PLATELET # BLD AUTO: 314 X10*3/UL (ref 150–450)
POTASSIUM SERPL-SCNC: 4.5 MMOL/L (ref 3.5–5.3)
PR INTERVAL: 174 MS
PROCALCITONIN SERPL-MCNC: 0.09 NG/ML
Q ONSET: 224 MS
QRS COUNT: 12 BEATS
QRS DURATION: 72 MS
QT INTERVAL: 398 MS
QTC CALCULATION(BAZETT): 441 MS
QTC FREDERICIA: 427 MS
R AXIS: 22 DEGREES
RBC # BLD AUTO: 4.3 X10*6/UL (ref 4–5.2)
SODIUM SERPL-SCNC: 140 MMOL/L (ref 136–145)
T AXIS: 59 DEGREES
T OFFSET: 423 MS
VENTRICULAR RATE: 74 BPM
WBC # BLD AUTO: 10.1 X10*3/UL (ref 4.4–11.3)

## 2025-01-17 PROCEDURE — 99232 SBSQ HOSP IP/OBS MODERATE 35: CPT

## 2025-01-17 PROCEDURE — 94640 AIRWAY INHALATION TREATMENT: CPT

## 2025-01-17 PROCEDURE — 2500000002 HC RX 250 W HCPCS SELF ADMINISTERED DRUGS (ALT 637 FOR MEDICARE OP, ALT 636 FOR OP/ED): Mod: MUE | Performed by: INTERNAL MEDICINE

## 2025-01-17 PROCEDURE — 96366 THER/PROPH/DIAG IV INF ADDON: CPT

## 2025-01-17 PROCEDURE — 2500000002 HC RX 250 W HCPCS SELF ADMINISTERED DRUGS (ALT 637 FOR MEDICARE OP, ALT 636 FOR OP/ED): Mod: MUE

## 2025-01-17 PROCEDURE — 94664 DEMO&/EVAL PT USE INHALER: CPT

## 2025-01-17 PROCEDURE — 2500000001 HC RX 250 WO HCPCS SELF ADMINISTERED DRUGS (ALT 637 FOR MEDICARE OP): Performed by: HOSPITALIST

## 2025-01-17 PROCEDURE — 96376 TX/PRO/DX INJ SAME DRUG ADON: CPT

## 2025-01-17 PROCEDURE — 85027 COMPLETE CBC AUTOMATED: CPT

## 2025-01-17 PROCEDURE — G0378 HOSPITAL OBSERVATION PER HR: HCPCS

## 2025-01-17 PROCEDURE — 94760 N-INVAS EAR/PLS OXIMETRY 1: CPT

## 2025-01-17 PROCEDURE — 2500000001 HC RX 250 WO HCPCS SELF ADMINISTERED DRUGS (ALT 637 FOR MEDICARE OP)

## 2025-01-17 PROCEDURE — 2500000004 HC RX 250 GENERAL PHARMACY W/ HCPCS (ALT 636 FOR OP/ED)

## 2025-01-17 PROCEDURE — 9420000001 HC RT PATIENT EDUCATION 5 MIN

## 2025-01-17 PROCEDURE — 80048 BASIC METABOLIC PNL TOTAL CA: CPT

## 2025-01-17 PROCEDURE — 93005 ELECTROCARDIOGRAM TRACING: CPT

## 2025-01-17 PROCEDURE — 2500000002 HC RX 250 W HCPCS SELF ADMINISTERED DRUGS (ALT 637 FOR MEDICARE OP, ALT 636 FOR OP/ED): Mod: MUE | Performed by: HOSPITALIST

## 2025-01-17 PROCEDURE — 36415 COLL VENOUS BLD VENIPUNCTURE: CPT

## 2025-01-17 PROCEDURE — 2500000002 HC RX 250 W HCPCS SELF ADMINISTERED DRUGS (ALT 637 FOR MEDICARE OP, ALT 636 FOR OP/ED): Performed by: NURSE PRACTITIONER

## 2025-01-17 PROCEDURE — 84145 PROCALCITONIN (PCT): CPT | Mod: GENLAB

## 2025-01-17 RX ORDER — AZITHROMYCIN 250 MG/1
500 TABLET, FILM COATED ORAL
Status: DISCONTINUED | OUTPATIENT
Start: 2025-01-17 | End: 2025-01-18 | Stop reason: HOSPADM

## 2025-01-17 RX ADMIN — FLUTICASONE FUROATE AND VILANTEROL TRIFENATATE 1 PUFF: 200; 25 POWDER RESPIRATORY (INHALATION) at 10:05

## 2025-01-17 RX ADMIN — CEFTRIAXONE 2 G: 2 INJECTION, SOLUTION INTRAVENOUS at 12:14

## 2025-01-17 RX ADMIN — IPRATROPIUM BROMIDE AND ALBUTEROL SULFATE 3 ML: .5; 3 SOLUTION RESPIRATORY (INHALATION) at 15:48

## 2025-01-17 RX ADMIN — GUAIFENESIN 600 MG: 600 TABLET, EXTENDED RELEASE ORAL at 08:42

## 2025-01-17 RX ADMIN — VITAM B12 500 MCG: 100 TAB at 08:43

## 2025-01-17 RX ADMIN — METHYLPREDNISOLONE SODIUM SUCCINATE 40 MG: 40 INJECTION, POWDER, FOR SOLUTION INTRAMUSCULAR; INTRAVENOUS at 21:18

## 2025-01-17 RX ADMIN — METHYLPREDNISOLONE SODIUM SUCCINATE 40 MG: 40 INJECTION, POWDER, FOR SOLUTION INTRAMUSCULAR; INTRAVENOUS at 00:11

## 2025-01-17 RX ADMIN — ASPIRIN 81 MG: 81 TABLET, COATED ORAL at 08:41

## 2025-01-17 RX ADMIN — FOLIC ACID 1 MG: 1 TABLET ORAL at 08:42

## 2025-01-17 RX ADMIN — AZITHROMYCIN DIHYDRATE 500 MG: 250 TABLET, FILM COATED ORAL at 08:42

## 2025-01-17 RX ADMIN — ROSUVASTATIN CALCIUM 20 MG: 10 TABLET, FILM COATED ORAL at 21:17

## 2025-01-17 RX ADMIN — Medication 2000 UNITS: at 08:42

## 2025-01-17 RX ADMIN — GUAIFENESIN 600 MG: 600 TABLET, EXTENDED RELEASE ORAL at 21:17

## 2025-01-17 RX ADMIN — PANTOPRAZOLE SODIUM 40 MG: 40 TABLET, DELAYED RELEASE ORAL at 06:21

## 2025-01-17 RX ADMIN — LEVOTHYROXINE SODIUM 88 MCG: 88 TABLET ORAL at 21:17

## 2025-01-17 RX ADMIN — IPRATROPIUM BROMIDE AND ALBUTEROL SULFATE 3 ML: .5; 3 SOLUTION RESPIRATORY (INHALATION) at 21:41

## 2025-01-17 RX ADMIN — IPRATROPIUM BROMIDE AND ALBUTEROL SULFATE 3 ML: .5; 3 SOLUTION RESPIRATORY (INHALATION) at 10:05

## 2025-01-17 RX ADMIN — METHYLPREDNISOLONE SODIUM SUCCINATE 40 MG: 40 INJECTION, POWDER, FOR SOLUTION INTRAMUSCULAR; INTRAVENOUS at 08:41

## 2025-01-17 ASSESSMENT — COGNITIVE AND FUNCTIONAL STATUS - GENERAL
DAILY ACTIVITIY SCORE: 24
DAILY ACTIVITIY SCORE: 24
MOBILITY SCORE: 24
MOBILITY SCORE: 24

## 2025-01-17 ASSESSMENT — PAIN SCALES - GENERAL
PAINLEVEL_OUTOF10: 0 - NO PAIN
PAINLEVEL_OUTOF10: 0 - NO PAIN

## 2025-01-17 ASSESSMENT — ACTIVITIES OF DAILY LIVING (ADL): LACK_OF_TRANSPORTATION: NO

## 2025-01-17 ASSESSMENT — PAIN - FUNCTIONAL ASSESSMENT: PAIN_FUNCTIONAL_ASSESSMENT: 0-10

## 2025-01-17 NOTE — PROGRESS NOTES
Laura Banuelos is a 79 y.o. female on day 0 of admission presenting with Community acquired bacterial pneumonia.      Subjective   Pt assessed at bedside. Feeling ok, still requiring 2L of oxygen acutely. Discussed plan of care and possible discharge tomorrow, pt is agreeable.        Objective     Last Recorded Vitals  /65 (BP Location: Left arm, Patient Position: Lying)   Pulse 63   Temp 36.3 °C (97.3 °F) (Temporal)   Resp 16   Wt 58.5 kg (128 lb 15.5 oz)   SpO2 94%   Intake/Output last 3 Shifts:    Intake/Output Summary (Last 24 hours) at 1/17/2025 0905  Last data filed at 1/17/2025 0627  Gross per 24 hour   Intake 400 ml   Output --   Net 400 ml       Admission Weight  Weight: 54.4 kg (120 lb) (01/16/25 1417)    Daily Weight  01/16/25 : 58.5 kg (128 lb 15.5 oz)    Image Results  XR chest 1 view  Narrative: Interpreted By:  Niko Hunter,   STUDY:  XR CHEST 1 VIEW  1/16/2025 3:15 pm      INDICATION:  Signs/Symptoms:cough      COMPARISON:  Digital mammograms dated 05/31/2022      ACCESSION NUMBER(S):  [id number]      ORDERING CLINICIAN:  KOBY ARRIOLA      TECHNIQUE:  A single AP portable radiograph of the chest was obtained.      FINDINGS:  Moderate diffuse interstitial infiltrates are seen throughout the  lungs bilaterally, greatest in the right mid lung, and may represent  fibrosis, edema and/or pneumonia. No pneumothorax is identified. The  cardiac silhouette is within normal limits for size.      Impression: Diffuse interstitial infiltrates bilaterally, as above. Clinical  correlation and continued follow-up until clearing is recommended.      MACRO:  None.      Signed by: Niko Hunter 1/16/2025 3:19 PM  Dictation workstation:   [id number]      Physical Exam  Vitals reviewed.   HENT:      Head: Normocephalic and atraumatic.      Right Ear: External ear normal.      Left Ear: External ear normal.      Nose: Nose normal.      Mouth/Throat:      Pharynx: Oropharynx is clear.   Eyes:       Conjunctiva/sclera: Conjunctivae normal.   Cardiovascular:      Rate and Rhythm: Normal rate and regular rhythm.      Pulses: Normal pulses.      Heart sounds: Normal heart sounds.   Pulmonary:      Comments: Crackles in the bases   Abdominal:      General: Bowel sounds are normal.      Palpations: Abdomen is soft.   Musculoskeletal:         General: Normal range of motion.      Cervical back: Normal range of motion and neck supple.   Skin:     General: Skin is dry.   Neurological:      General: No focal deficit present.      Mental Status: She is alert and oriented to person, place, and time.   Psychiatric:         Mood and Affect: Mood normal.         Behavior: Behavior normal.       Relevant Results  Scheduled medications  aspirin, 81 mg, oral, Daily  azithromycin, 500 mg, oral, q24h ANNAMARIE  cefTRIAXone, 2 g, intravenous, q24h  cholecalciferol, 2,000 Units, oral, Daily  cyanocobalamin, 500 mcg, oral, Daily  enoxaparin, 40 mg, subcutaneous, q24h  fluticasone furoate-vilanteroL, 1 puff, inhalation, Daily  folic acid, 1 mg, oral, Daily  guaiFENesin, 600 mg, oral, BID  ipratropium-albuteroL, 3 mL, nebulization, TID  levothyroxine, 88 mcg, oral, Nightly  methylPREDNISolone sodium succinate (PF), 40 mg, intravenous, q8h  oxygen, , inhalation, Continuous - Inhalation  pantoprazole, 40 mg, oral, Daily before breakfast  rosuvastatin, 20 mg, oral, Nightly      Continuous medications     PRN medications  PRN medications: acetaminophen **OR** [DISCONTINUED] acetaminophen **OR** [DISCONTINUED] acetaminophen, acetaminophen **OR** [DISCONTINUED] acetaminophen **OR** [DISCONTINUED] acetaminophen, albuterol, benzocaine-menthol, benzonatate, dextromethorphan-guaifenesin, melatonin, ondansetron **OR** ondansetron, polyethylene glycol    Results for orders placed or performed during the hospital encounter of 01/16/25 (from the past 24 hours)   Sars-CoV-2 and Influenza A/B PCR   Result Value Ref Range    Flu A Result Not Detected Not  Detected    Flu B Result Not Detected Not Detected    Coronavirus 2019, PCR Not Detected Not Detected   CBC and Auto Differential   Result Value Ref Range    WBC 14.4 (H) 4.4 - 11.3 x10*3/uL    nRBC 0.0 0.0 - 0.0 /100 WBCs    RBC 4.46 4.00 - 5.20 x10*6/uL    Hemoglobin 14.1 12.0 - 16.0 g/dL    Hematocrit 42.3 36.0 - 46.0 %    MCV 95 80 - 100 fL    MCH 31.6 26.0 - 34.0 pg    MCHC 33.3 32.0 - 36.0 g/dL    RDW 13.9 11.5 - 14.5 %    Platelets 291 150 - 450 x10*3/uL    Neutrophils % 73.2 40.0 - 80.0 %    Immature Granulocytes %, Automated 0.3 0.0 - 0.9 %    Lymphocytes % 16.1 13.0 - 44.0 %    Monocytes % 8.3 2.0 - 10.0 %    Eosinophils % 1.7 0.0 - 6.0 %    Basophils % 0.4 0.0 - 2.0 %    Neutrophils Absolute 10.53 (H) 1.60 - 5.50 x10*3/uL    Immature Granulocytes Absolute, Automated 0.04 0.00 - 0.50 x10*3/uL    Lymphocytes Absolute 2.32 0.80 - 3.00 x10*3/uL    Monocytes Absolute 1.20 (H) 0.05 - 0.80 x10*3/uL    Eosinophils Absolute 0.24 0.00 - 0.40 x10*3/uL    Basophils Absolute 0.06 0.00 - 0.10 x10*3/uL   Comprehensive Metabolic Panel   Result Value Ref Range    Glucose 107 (H) 74 - 99 mg/dL    Sodium 137 136 - 145 mmol/L    Potassium 3.9 3.5 - 5.3 mmol/L    Chloride 102 98 - 107 mmol/L    Bicarbonate 24 21 - 32 mmol/L    Anion Gap 15 10 - 20 mmol/L    Urea Nitrogen 16 6 - 23 mg/dL    Creatinine 0.83 0.50 - 1.05 mg/dL    eGFR 72 >60 mL/min/1.73m*2    Calcium 9.9 8.6 - 10.3 mg/dL    Albumin 3.9 3.4 - 5.0 g/dL    Alkaline Phosphatase 87 33 - 136 U/L    Total Protein 7.9 6.4 - 8.2 g/dL    AST 21 9 - 39 U/L    Bilirubin, Total 0.5 0.0 - 1.2 mg/dL    ALT 12 7 - 45 U/L   Lactate   Result Value Ref Range    Lactate 1.8 0.4 - 2.0 mmol/L   Blood Culture    Specimen: Peripheral Venipuncture; Blood culture   Result Value Ref Range    Blood Culture Loaded on Instrument - Culture in progress    Blood Culture    Specimen: Peripheral Venipuncture; Blood culture   Result Value Ref Range    Blood Culture Loaded on Instrument - Culture in  progress    Urinalysis with Reflex Culture and Microscopic   Result Value Ref Range    Color, Urine Yellow Light-Yellow, Yellow, Dark-Yellow    Appearance, Urine Clear Clear    Specific Gravity, Urine 1.005 1.005 - 1.035    pH, Urine 6.0 5.0, 5.5, 6.0, 6.5, 7.0, 7.5, 8.0    Protein, Urine NEGATIVE NEGATIVE, 10 (TRACE), 20 (TRACE) mg/dL    Glucose, Urine Normal Normal mg/dL    Blood, Urine NEGATIVE NEGATIVE    Ketones, Urine NEGATIVE NEGATIVE mg/dL    Bilirubin, Urine NEGATIVE NEGATIVE    Urobilinogen, Urine Normal Normal mg/dL    Nitrite, Urine NEGATIVE NEGATIVE    Leukocyte Esterase, Urine NEGATIVE NEGATIVE   Extra Urine Gray Tube   Result Value Ref Range    Extra Tube Hold for add-ons.    Basic metabolic panel   Result Value Ref Range    Glucose 164 (H) 74 - 99 mg/dL    Sodium 140 136 - 145 mmol/L    Potassium 4.5 3.5 - 5.3 mmol/L    Chloride 106 98 - 107 mmol/L    Bicarbonate 24 21 - 32 mmol/L    Anion Gap 15 10 - 20 mmol/L    Urea Nitrogen 12 6 - 23 mg/dL    Creatinine 0.65 0.50 - 1.05 mg/dL    eGFR 90 >60 mL/min/1.73m*2    Calcium 9.5 8.6 - 10.3 mg/dL   CBC   Result Value Ref Range    WBC 10.1 4.4 - 11.3 x10*3/uL    nRBC 0.0 0.0 - 0.0 /100 WBCs    RBC 4.30 4.00 - 5.20 x10*6/uL    Hemoglobin 13.3 12.0 - 16.0 g/dL    Hematocrit 40.4 36.0 - 46.0 %    MCV 94 80 - 100 fL    MCH 30.9 26.0 - 34.0 pg    MCHC 32.9 32.0 - 36.0 g/dL    RDW 13.7 11.5 - 14.5 %    Platelets 314 150 - 450 x10*3/uL     *Note: Due to a large number of results and/or encounters for the requested time period, some results have not been displayed. A complete set of results can be found in Results Review.          Assessment/Plan      Assessment & Plan  Community acquired bacterial pneumonia    COPD exacerbation (Multi)    Acute hypoxic respiratory failure (Multi)    Leukocytosis    Graves disease    Atrial fibrillation (Multi)    GERD without esophagitis    Vitamin D insufficiency    Vitamin B12 deficiency    #Community acquired pneumonia  #COPD  exacerbation  #Acute hypoxic respiratory failure   #Leukocytosis, resolved   -CXR: Diffuse interstitial infiltrates bilaterally, as above. Clinical  correlation and continued follow-up until clearing is recommended.  -WBC 14.4 > 10.1   -Lactate 1.8  -Procal pending  -Sputum cx if able   -Supplemental oxygen to maintain an sp02 greater than 92%  -Currently on 2L   -Baseline RA  -IV azithro, ceftriaxone (Day2)  -Bronchodilators  -Solumedrol q8 > transitioned to q12   -Mucinex BID   -Overnight sleep study ordered for tonight     #Graves Disease  -Continue levothyroxine      #Atrial fibrillation s/p ablation   #HLD  -Continue ASA, rosuvastatin      #GERD  -Continue pantoprazole sub for omeprazole      #Vit B12 Deficiency  #Vit D Deficiency   -Continue cholecalciferol, cyanocobalamin      DVT ppx  -lovenox     PUD ppx  -pantoprazole     F: PRN  E: Replete per protocol  N: Regular   A: PIV     Disposition: Pt requires less than 2 inpatient days at this time   Code Status: Full Code         KAMLESH Barahona-CNP

## 2025-01-17 NOTE — DISCHARGE INSTR - OTHER ORDERS
Thank you for choosing [facility] for your Health Care needs. As you transition from the hospital back to home, we hope we took your preferences into account on how you manage your health needs so you can manage your health at home.     You may receive a survey in the mail within the next couple weeks. Please take the time to complete it and return it. Your input is ALWAYS important to us. Thank you!  Your Care Transition Team - Keely Simpson Angie  & Emile     For questions about your medications listed on your discharge instructions, please call the Nurses Station at [phone].

## 2025-01-17 NOTE — CARE PLAN
The patient's goals for the shift include unsure    The clinical goals for the shift include patient will remain afebrile this shift   Patient up walking halls no oxygen needed patient with no complaints of SOB

## 2025-01-17 NOTE — CARE PLAN
The patient's goals for the shift include unsure    The clinical goals for the shift include Pt will tolerate IV ATB with no adverse reactions.Pt's Spo2 will remain 92% or > on 2L    Over the shift, the patient remained stable and free from injuries. VSS. No c/o pain. Pt tolerating respiratory treatments. Pt did well overnight.

## 2025-01-17 NOTE — PROGRESS NOTES
01/17/25 1257   Discharge Planning   Living Arrangements Alone  (in-law suite)   Assistance Needed Met with patient at bedside. AAOx3. Independent in ADL's and iADL's. She is very active with gardening and [place]. She does have a shower bench and grab bars, but does not use any other DME. PCP Helena French last seen 11/18/24. [facility] in [place] is preferred pharmacy. SHe manages her own medications utilizing weekly medbox. She denies any further needs when medically ready and is comfortable returning home. DC Plan: home no needs.   Type of Residence Private residence  (ground level in-law suite.)   Number of Stairs to Enter Residence 1   Number of Stairs Within Residence 0   Do you have animals or pets at home? Yes   Type of Animals or Pets 2 cats   Who is requesting discharge planning? Provider   Home or Post Acute Services None   Expected Discharge Disposition Home   Does the patient need discharge transport arranged? No   Financial Resource Strain   How hard is it for you to pay for the very basics like food, housing, medical care, and heating? Not hard   Housing Stability   In the last 12 months, was there a time when you were not able to pay the mortgage or rent on time? N   In the past 12 months, how many times have you moved where you were living? 0   At any time in the past 12 months, were you homeless or living in a shelter (including now)? N   Transportation Needs   In the past 12 months, has lack of transportation kept you from medical appointments or from getting medications? no   In the past 12 months, has lack of transportation kept you from meetings, work, or from getting things needed for daily living? No   Stroke Family Assessment   Stroke Family Assessment Needed No   Intensity of Service   Intensity of Service 0-30 min

## 2025-01-18 VITALS
BODY MASS INDEX: 22.02 KG/M2 | HEIGHT: 64 IN | SYSTOLIC BLOOD PRESSURE: 153 MMHG | RESPIRATION RATE: 16 BRPM | OXYGEN SATURATION: 90 % | WEIGHT: 128.97 LBS | DIASTOLIC BLOOD PRESSURE: 76 MMHG | HEART RATE: 84 BPM | TEMPERATURE: 96.3 F

## 2025-01-18 LAB
ANION GAP SERPL CALC-SCNC: 12 MMOL/L (ref 10–20)
BUN SERPL-MCNC: 18 MG/DL (ref 6–23)
CALCIUM SERPL-MCNC: 9.1 MG/DL (ref 8.6–10.3)
CHLORIDE SERPL-SCNC: 108 MMOL/L (ref 98–107)
CO2 SERPL-SCNC: 25 MMOL/L (ref 21–32)
CREAT SERPL-MCNC: 0.82 MG/DL (ref 0.5–1.05)
EGFRCR SERPLBLD CKD-EPI 2021: 73 ML/MIN/1.73M*2
ERYTHROCYTE [DISTWIDTH] IN BLOOD BY AUTOMATED COUNT: 13.8 % (ref 11.5–14.5)
GLUCOSE SERPL-MCNC: 145 MG/DL (ref 74–99)
HCT VFR BLD AUTO: 34.7 % (ref 36–46)
HGB BLD-MCNC: 11.6 G/DL (ref 12–16)
MCH RBC QN AUTO: 31.5 PG (ref 26–34)
MCHC RBC AUTO-ENTMCNC: 33.4 G/DL (ref 32–36)
MCV RBC AUTO: 94 FL (ref 80–100)
NRBC BLD-RTO: 0 /100 WBCS (ref 0–0)
PLATELET # BLD AUTO: 324 X10*3/UL (ref 150–450)
POTASSIUM SERPL-SCNC: 4.8 MMOL/L (ref 3.5–5.3)
RBC # BLD AUTO: 3.68 X10*6/UL (ref 4–5.2)
SODIUM SERPL-SCNC: 140 MMOL/L (ref 136–145)
WBC # BLD AUTO: 16.1 X10*3/UL (ref 4.4–11.3)

## 2025-01-18 PROCEDURE — 36415 COLL VENOUS BLD VENIPUNCTURE: CPT

## 2025-01-18 PROCEDURE — 94640 AIRWAY INHALATION TREATMENT: CPT

## 2025-01-18 PROCEDURE — 2500000002 HC RX 250 W HCPCS SELF ADMINISTERED DRUGS (ALT 637 FOR MEDICARE OP, ALT 636 FOR OP/ED): Mod: MUE | Performed by: NURSE PRACTITIONER

## 2025-01-18 PROCEDURE — G0378 HOSPITAL OBSERVATION PER HR: HCPCS

## 2025-01-18 PROCEDURE — 85027 COMPLETE CBC AUTOMATED: CPT

## 2025-01-18 PROCEDURE — 2500000004 HC RX 250 GENERAL PHARMACY W/ HCPCS (ALT 636 FOR OP/ED)

## 2025-01-18 PROCEDURE — 99239 HOSP IP/OBS DSCHRG MGMT >30: CPT | Performed by: NURSE PRACTITIONER

## 2025-01-18 PROCEDURE — 2500000001 HC RX 250 WO HCPCS SELF ADMINISTERED DRUGS (ALT 637 FOR MEDICARE OP): Performed by: HOSPITALIST

## 2025-01-18 PROCEDURE — 2500000001 HC RX 250 WO HCPCS SELF ADMINISTERED DRUGS (ALT 637 FOR MEDICARE OP)

## 2025-01-18 PROCEDURE — 96366 THER/PROPH/DIAG IV INF ADDON: CPT

## 2025-01-18 PROCEDURE — 96376 TX/PRO/DX INJ SAME DRUG ADON: CPT

## 2025-01-18 PROCEDURE — 80048 BASIC METABOLIC PNL TOTAL CA: CPT

## 2025-01-18 PROCEDURE — 94760 N-INVAS EAR/PLS OXIMETRY 1: CPT

## 2025-01-18 PROCEDURE — 2500000002 HC RX 250 W HCPCS SELF ADMINISTERED DRUGS (ALT 637 FOR MEDICARE OP, ALT 636 FOR OP/ED): Mod: MUE

## 2025-01-18 RX ORDER — METHYLPREDNISOLONE 4 MG/1
TABLET ORAL
Qty: 21 TABLET | Refills: 0 | Status: SHIPPED | OUTPATIENT
Start: 2025-01-18 | End: 2025-01-24

## 2025-01-18 RX ORDER — CEFUROXIME AXETIL 500 MG/1
500 TABLET ORAL 2 TIMES DAILY
Qty: 14 TABLET | Refills: 0 | Status: SHIPPED | OUTPATIENT
Start: 2025-01-18 | End: 2025-01-25

## 2025-01-18 RX ORDER — FLUTICASONE PROPIONATE AND SALMETEROL 250; 50 UG/1; UG/1
1 POWDER RESPIRATORY (INHALATION)
Qty: 180 EACH | Refills: 0 | Status: SHIPPED | OUTPATIENT
Start: 2025-01-18

## 2025-01-18 RX ORDER — AZITHROMYCIN 250 MG/1
TABLET, FILM COATED ORAL
Qty: 6 TABLET | Refills: 0 | Status: SHIPPED | OUTPATIENT
Start: 2025-01-19 | End: 2025-01-23

## 2025-01-18 RX ORDER — PSYLLIUM HUSK 0.4 G
2 CAPSULE ORAL 2 TIMES DAILY
Start: 2025-01-18

## 2025-01-18 RX ADMIN — Medication 2000 UNITS: at 09:17

## 2025-01-18 RX ADMIN — PANTOPRAZOLE SODIUM 40 MG: 40 TABLET, DELAYED RELEASE ORAL at 06:03

## 2025-01-18 RX ADMIN — AZITHROMYCIN DIHYDRATE 500 MG: 250 TABLET, FILM COATED ORAL at 09:17

## 2025-01-18 RX ADMIN — FOLIC ACID 1 MG: 1 TABLET ORAL at 09:17

## 2025-01-18 RX ADMIN — METHYLPREDNISOLONE SODIUM SUCCINATE 40 MG: 40 INJECTION, POWDER, FOR SOLUTION INTRAMUSCULAR; INTRAVENOUS at 09:16

## 2025-01-18 RX ADMIN — CEFTRIAXONE 2 G: 2 INJECTION, SOLUTION INTRAVENOUS at 09:16

## 2025-01-18 RX ADMIN — VITAM B12 500 MCG: 100 TAB at 09:17

## 2025-01-18 RX ADMIN — GUAIFENESIN 600 MG: 600 TABLET, EXTENDED RELEASE ORAL at 09:17

## 2025-01-18 RX ADMIN — FLUTICASONE FUROATE AND VILANTEROL TRIFENATATE 1 PUFF: 200; 25 POWDER RESPIRATORY (INHALATION) at 10:22

## 2025-01-18 RX ADMIN — ASPIRIN 81 MG: 81 TABLET, COATED ORAL at 09:17

## 2025-01-18 RX ADMIN — IPRATROPIUM BROMIDE AND ALBUTEROL SULFATE 3 ML: .5; 3 SOLUTION RESPIRATORY (INHALATION) at 10:02

## 2025-01-18 ASSESSMENT — PAIN SCALES - GENERAL: PAINLEVEL_OUTOF10: 0 - NO PAIN

## 2025-01-18 ASSESSMENT — PAIN - FUNCTIONAL ASSESSMENT: PAIN_FUNCTIONAL_ASSESSMENT: 0-10

## 2025-01-18 NOTE — CARE PLAN
The patient's goals for the shift include unsure    The clinical goals for the shift include Pt will maintain SPO@ >92% throughout shift.    Over the shift, the patient did not make progress toward the following goals. Barriers to progression include n/a. Recommendations to address these barriers include n/a.

## 2025-01-18 NOTE — DISCHARGE SUMMARY
Discharge Diagnosis  Community acquired bacterial pneumonia  COPD exacerbation  Acute hypoxic respiratory failure    Issues Requiring Follow-Up      Discharge Meds     Medication List      START taking these medications     azithromycin 250 mg tablet; Commonly known as: Zithromax; Take 2 tabs   (500 mg) by mouth today, then take 1 tab daily for 4 days. Do not fill   before January 19, 2025.; Start taking on: January 19, 2025   cefuroxime 500 mg tablet; Commonly known as: Ceftin; Take 1 tablet (500   mg) by mouth 2 times a day for 7 days.   fluticasone propion-salmeteroL 250-50 mcg/dose diskus inhaler; Commonly   known as: Advair Diskus; Inhale 1 puff 2 times a day. Rinse mouth with   water after use to reduce aftertaste and incidence of candidiasis. Do not   swallow.; Replaces: budesonide-formoteroL 160-4.5 mcg/actuation inhaler   methylPREDNISolone 4 mg tablets; Commonly known as: Medrol Dospak; Take   as directed on package.     CHANGE how you take these medications     omeprazole 20 mg DR capsule; Commonly known as: PriLOSEC; TAKE ONE   CAPSULE BY MOUTH TWICE A DAY BEFORE MEALS; What changed: See the new   instructions.     CONTINUE taking these medications     albuterol 90 mcg/actuation inhaler; Inhale 2 puffs every 4 hours if   needed for wheezing.   aspirin 81 mg EC tablet   benzonatate 100 mg capsule; Commonly known as: Tessalon   calcium carbonate-vitamin D3 500 mg-5 mcg (200 unit) tablet; Take 2   tablets by mouth 2 times a day.   cholecalciferol 50 MCG (2000 UT) tablet; Commonly known as: Vitamin D-3   cyanocobalamin 500 mcg tablet; Commonly known as: Vitamin B-12   flaxseed oiL 1,000 mg capsule   folic acid 1 mg tablet; Commonly known as: Folvite   glucosamine sulfate 500 mg tablet   guaiFENesin 600 mg 12 hr tablet; Commonly known as: Mucinex   levothyroxine 88 mcg tablet; Commonly known as: Synthroid, Levoxyl; Take   1 tablet (88 mcg) by mouth once daily.   multivitamin tablet   polyethylene glycol 17  gram/dose powder; Commonly known as: Glycolax,   Miralax   PRESERVISION AREDS-2 ORAL   rosuvastatin 20 mg tablet; Commonly known as: Crestor; Take 1 tablet (20   mg) by mouth once daily.   turmeric-turmeric root extract 450-50 mg capsule     STOP taking these medications     amoxicillin 500 mg capsule; Commonly known as: Amoxil   budesonide-formoteroL 160-4.5 mcg/actuation inhaler; Commonly known as:   Symbicort; Replaced by: fluticasone propion-salmeteroL 250-50 mcg/dose   diskus inhaler   naproxen 375 mg tablet; Commonly known as: Naprosyn       Test Results Pending At Discharge  Pending Labs       Order Current Status    Blood Culture Preliminary result    Blood Culture Preliminary result        79 y.o. female presenting with fever. Pt states that on Monday, she developed a fever and fatigue. She says that she also developed some shortness of breath on Tuesday. She has a chronic dry cough that she says has not changed. She had a virtual appt with her PCP today who advised her to come to the ED. ED workup revealed pneumonia and hypoxia at 82% on RA. She was admitted to med surg for further treatment. Upon examination, pt is sitting up in bed with no distress. She was placed on 2L acutely. Discussed plan of care, pt is agreeable.      ED VS: T36.7, HR 76, RR 18, /78, Sp02 93%RA     Imaging: CXR- Diffuse interstitial infiltrates bilaterally, as above. Clinical  correlation and continued follow-up until clearing is recommended.     Labs: Glu 107, Na 137, K 3.9, Bun/creat 16/0.83, Lactate 1.8, WBC 14.4, H/H 14.1/42.3, Plt 291, covid/flu neg, UA neg        Hospital Course    Community acquired pneumonia  COPD exacerbation  Acute hypoxic respiratory failure   Leukocytosis, resolved   -CXR: Diffuse interstitial infiltrates bilaterally, as above. Clinical  correlation and continued follow-up until clearing is recommended.  -WBC 14.4 > 10.1   -Lactate 1.8  -Procal pending  -Sputum cx if able   -Supplemental oxygen to  maintain an sp02 greater than 92%  -Currently on 2L   -Baseline RA  -IV azithro, ceftriaxone (Day2)  -Bronchodilators  -Solumedrol q8 > transitioned to q12   -Mucinex BID   -Overnight sleep study ordered for tonight     Graves Disease  -Continue levothyroxine      Atrial fibrillation s/p ablation   HLD  -Continue ASA, rosuvastatin      GERD  -Continue pantoprazole sub for omeprazole      Vit B12 Deficiency  Vit D Deficiency   -Continue cholecalciferol, cyanocobalamin      DVT ppx  -lovenox     PUD ppx  -pantoprazole     Code Status: Full Code      Disposition: Patient was stable to be discharged on azithromycin, cefuroxime, medrol dose pack and Advair. She will follow up with her PCP.    Total cumulative time spent in preparation of this discharge including documentation review, coordination of care with the medical team including PT/SW/care coordinators and treating consultants, discussion with patient and pertinent family members and finalization of prescriptions, follow-up appointments, and this discharge summary was approximately 45 minutes.     Seen and discussed with Dr. Abelino MD       Pertinent Physical Exam At Time of Discharge  Physical Exam  Vitals reviewed.   HENT:      Head: Normocephalic and atraumatic.      Right Ear: External ear normal.      Left Ear: External ear normal.      Nose: Nose normal.      Mouth/Throat:      Pharynx: Oropharynx is clear.   Eyes:      Conjunctiva/sclera: Conjunctivae normal.   Cardiovascular:      Rate and Rhythm: Normal rate and regular rhythm.      Pulses: Normal pulses.      Heart sounds: Normal heart sounds.   Pulmonary:      Comments: Crackles in the bases   Abdominal:      General: Bowel sounds are normal.      Palpations: Abdomen is soft.   Musculoskeletal:         General: Normal range of motion.      Cervical back: Normal range of motion and neck supple.   Skin:     General: Skin is dry.   Neurological:      General: No focal deficit present.      Mental  Status: She is alert and oriented to person, place, and time.   Psychiatric:         Mood and Affect: Mood normal.         Behavior: Behavior normal.   Outpatient Follow-Up  Future Appointments   Date Time Provider Department Center   2/6/2025 11:30 AM RENARD Melo [id number]   2/17/2025  3:15 PM Helena French MD [id number] [facility]         KAMLESH Kauffman-CNP

## 2025-01-18 NOTE — CARE PLAN
The patient's goals for the shift include unsure    The clinical goals for the shift include spo2 to maintain >92% on room air    Goal met. Vss. Patient denies pain. Patient discharging to home. Discharge instructions reviewed with patient that includes 4 new medications with printed education on meds., follow up appointments and education on PNE.     no

## 2025-01-19 LAB
BACTERIA BLD CULT: NORMAL
BACTERIA BLD CULT: NORMAL

## 2025-01-21 LAB
BACTERIA BLD CULT: NORMAL
BACTERIA BLD CULT: NORMAL

## 2025-01-28 ENCOUNTER — PATIENT OUTREACH (OUTPATIENT)
Dept: CARE COORDINATION | Facility: CLINIC | Age: 80
End: 2025-01-28
Payer: MEDICARE

## 2025-01-28 SDOH — ECONOMIC STABILITY: FOOD INSECURITY
ARE ANY OF YOUR NEEDS URGENT? FOR EXAMPLE, UNCERTAINTY OF WHERE YOU WILL GET YOUR NEXT MEAL OR NOT HAVING THE MEDICATIONS YOU NEED TO TAKE TOMORROW.: NO

## 2025-01-28 SDOH — ECONOMIC STABILITY: GENERAL: WOULD YOU LIKE HELP WITH ANY OF THE FOLLOWING NEEDS?: I DONT NEED HELP WITH ANY OF THESE

## 2025-01-28 NOTE — SIGNIFICANT EVENT
01/28/25 1529   Social Determinants of Health- Help Requested   Would you like help with any of the following needs? I dont need help with any of these   Are any of your needs urgent? For example, uncertainty of where you will get your next meal or not having the medications you need to take tomorrow. N

## 2025-01-28 NOTE — PROGRESS NOTES
Inpatient Facility: [facility]  Admission: 1/16/2025  Discharge:  1/18/2025  Discharge Diagnosis: Pneumonia    Outreach call completed.  This CM spoke with patient who states she is doing okay, continues to have fatigue , occasional cough.  Patient states she has completed all her discharge medications.  Reviewed upcoming appointments.  Patient had no questions or concerns. CM will continue follow up calls.    Engagement  Call Start Time: 1520 (1/28/2025  3:24 PM)    Medications  Medications reviewed with patient/caregiver?: Yes (1/28/2025  3:24 PM)  Is the patient having any side effects they believe may be caused by any medication additions or changes?: No (1/28/2025  3:24 PM)  Does the patient have all medications ordered at discharge?: Yes (1/28/2025  3:24 PM)  Care Management Interventions: No intervention needed (1/28/2025  3:24 PM)  Is the patient taking all medications as directed (includes completed medication regime)?: Yes (1/28/2025  3:24 PM)    Appointments  Does the patient have a primary care provider?: Yes (1/28/2025  3:24 PM)  Care Management Interventions: Verified appointment date/time/provider (1/28/2025  3:24 PM)  Has the patient kept scheduled appointments due by today?: Yes (1/28/2025  3:24 PM)  Care Management Interventions: Advised patient to keep appointment (1/28/2025  3:24 PM)    Self Management  What is the home health agency?: None (1/28/2025  3:24 PM)  What Durable Medical Equipment (DME) was ordered?: None (1/28/2025  3:24 PM)    Patient Teaching  Does the patient have access to their discharge instructions?: Yes (1/28/2025  3:24 PM)  Care Management Interventions: Reviewed instructions with patient (1/28/2025  3:24 PM)  What is the patient's perception of their health status since discharge?: Improving (1/28/2025  3:24 PM)  Is the patient/caregiver able to teach back the hierarchy of who to call/visit for symptoms/problems? PCP, Specialist, Home Health nurse, Urgent  Care, ED, 911: Yes (1/28/2025  3:24 PM)    Wrap Up  Wrap Up Additional Comments: CM will continue to follow for AYDE. (1/28/2025  3:24 PM)  Call End Time: 1525 (1/28/2025  3:24 PM)      STEVEN Lopez, RN   [phone]   ACO Department

## 2025-01-29 ENCOUNTER — APPOINTMENT (OUTPATIENT)
Dept: CARDIOLOGY | Facility: HOSPITAL | Age: 80
End: 2025-01-29
Payer: MEDICARE

## 2025-01-29 ENCOUNTER — HOSPITAL ENCOUNTER (EMERGENCY)
Facility: HOSPITAL | Age: 80
Discharge: HOME | End: 2025-01-29
Attending: EMERGENCY MEDICINE
Payer: MEDICARE

## 2025-01-29 ENCOUNTER — APPOINTMENT (OUTPATIENT)
Dept: RADIOLOGY | Facility: HOSPITAL | Age: 80
End: 2025-01-29
Payer: MEDICARE

## 2025-01-29 VITALS
TEMPERATURE: 98.1 F | HEART RATE: 72 BPM | BODY MASS INDEX: 20.49 KG/M2 | DIASTOLIC BLOOD PRESSURE: 57 MMHG | OXYGEN SATURATION: 94 % | WEIGHT: 120 LBS | HEIGHT: 64 IN | RESPIRATION RATE: 16 BRPM | SYSTOLIC BLOOD PRESSURE: 153 MMHG

## 2025-01-29 DIAGNOSIS — R53.1 GENERALIZED WEAKNESS: Primary | ICD-10-CM

## 2025-01-29 LAB
ALBUMIN SERPL BCP-MCNC: 3.9 G/DL (ref 3.4–5)
ALP SERPL-CCNC: 64 U/L (ref 33–136)
ALT SERPL W P-5'-P-CCNC: 35 U/L (ref 7–45)
ANION GAP SERPL CALC-SCNC: 13 MMOL/L (ref 10–20)
APPEARANCE UR: CLEAR
AST SERPL W P-5'-P-CCNC: 24 U/L (ref 9–39)
BASOPHILS # BLD AUTO: 0.04 X10*3/UL (ref 0–0.1)
BASOPHILS NFR BLD AUTO: 0.3 %
BILIRUB SERPL-MCNC: 0.5 MG/DL (ref 0–1.2)
BILIRUB UR STRIP.AUTO-MCNC: NEGATIVE MG/DL
BUN SERPL-MCNC: 19 MG/DL (ref 6–23)
CALCIUM SERPL-MCNC: 9.5 MG/DL (ref 8.6–10.3)
CARDIAC TROPONIN I PNL SERPL HS: 5 NG/L (ref 0–13)
CARDIAC TROPONIN I PNL SERPL HS: 6 NG/L (ref 0–13)
CHLORIDE SERPL-SCNC: 105 MMOL/L (ref 98–107)
CO2 SERPL-SCNC: 25 MMOL/L (ref 21–32)
COLOR UR: YELLOW
CREAT SERPL-MCNC: 0.8 MG/DL (ref 0.5–1.05)
EGFRCR SERPLBLD CKD-EPI 2021: 75 ML/MIN/1.73M*2
EOSINOPHIL # BLD AUTO: 0.38 X10*3/UL (ref 0–0.4)
EOSINOPHIL NFR BLD AUTO: 2.6 %
ERYTHROCYTE [DISTWIDTH] IN BLOOD BY AUTOMATED COUNT: 14.2 % (ref 11.5–14.5)
FLUAV RNA RESP QL NAA+PROBE: NOT DETECTED
FLUBV RNA RESP QL NAA+PROBE: NOT DETECTED
GLUCOSE SERPL-MCNC: 95 MG/DL (ref 74–99)
GLUCOSE UR STRIP.AUTO-MCNC: NORMAL MG/DL
HCT VFR BLD AUTO: 42.9 % (ref 36–46)
HGB BLD-MCNC: 14.1 G/DL (ref 12–16)
IMM GRANULOCYTES # BLD AUTO: 0.04 X10*3/UL (ref 0–0.5)
IMM GRANULOCYTES NFR BLD AUTO: 0.3 % (ref 0–0.9)
KETONES UR STRIP.AUTO-MCNC: NEGATIVE MG/DL
LACTATE SERPL-SCNC: 0.9 MMOL/L (ref 0.4–2)
LEUKOCYTE ESTERASE UR QL STRIP.AUTO: NEGATIVE
LYMPHOCYTES # BLD AUTO: 2.23 X10*3/UL (ref 0.8–3)
LYMPHOCYTES NFR BLD AUTO: 15.4 %
MAGNESIUM SERPL-MCNC: 2.06 MG/DL (ref 1.6–2.4)
MCH RBC QN AUTO: 31.1 PG (ref 26–34)
MCHC RBC AUTO-ENTMCNC: 32.9 G/DL (ref 32–36)
MCV RBC AUTO: 95 FL (ref 80–100)
MONOCYTES # BLD AUTO: 0.87 X10*3/UL (ref 0.05–0.8)
MONOCYTES NFR BLD AUTO: 6 %
NEUTROPHILS # BLD AUTO: 10.92 X10*3/UL (ref 1.6–5.5)
NEUTROPHILS NFR BLD AUTO: 75.4 %
NITRITE UR QL STRIP.AUTO: NEGATIVE
NRBC BLD-RTO: 0 /100 WBCS (ref 0–0)
PH UR STRIP.AUTO: 5.5 [PH]
PLATELET # BLD AUTO: 276 X10*3/UL (ref 150–450)
POTASSIUM SERPL-SCNC: 4 MMOL/L (ref 3.5–5.3)
PROT SERPL-MCNC: 8 G/DL (ref 6.4–8.2)
PROT UR STRIP.AUTO-MCNC: NORMAL MG/DL
RBC # BLD AUTO: 4.54 X10*6/UL (ref 4–5.2)
RBC # UR STRIP.AUTO: NEGATIVE /UL
RBC #/AREA URNS AUTO: NORMAL /HPF
SARS-COV-2 RNA RESP QL NAA+PROBE: NOT DETECTED
SODIUM SERPL-SCNC: 139 MMOL/L (ref 136–145)
SP GR UR STRIP.AUTO: 1.02
UROBILINOGEN UR STRIP.AUTO-MCNC: NORMAL MG/DL
WBC # BLD AUTO: 14.5 X10*3/UL (ref 4.4–11.3)
WBC #/AREA URNS AUTO: NORMAL /HPF

## 2025-01-29 PROCEDURE — 99285 EMERGENCY DEPT VISIT HI MDM: CPT | Mod: 25 | Performed by: EMERGENCY MEDICINE

## 2025-01-29 PROCEDURE — 83735 ASSAY OF MAGNESIUM: CPT | Performed by: EMERGENCY MEDICINE

## 2025-01-29 PROCEDURE — 84484 ASSAY OF TROPONIN QUANT: CPT | Performed by: EMERGENCY MEDICINE

## 2025-01-29 PROCEDURE — 71045 X-RAY EXAM CHEST 1 VIEW: CPT

## 2025-01-29 PROCEDURE — 71045 X-RAY EXAM CHEST 1 VIEW: CPT | Performed by: RADIOLOGY

## 2025-01-29 PROCEDURE — 83605 ASSAY OF LACTIC ACID: CPT | Performed by: EMERGENCY MEDICINE

## 2025-01-29 PROCEDURE — 96360 HYDRATION IV INFUSION INIT: CPT

## 2025-01-29 PROCEDURE — 85025 COMPLETE CBC W/AUTO DIFF WBC: CPT | Performed by: EMERGENCY MEDICINE

## 2025-01-29 PROCEDURE — 81001 URINALYSIS AUTO W/SCOPE: CPT | Performed by: EMERGENCY MEDICINE

## 2025-01-29 PROCEDURE — 87636 SARSCOV2 & INF A&B AMP PRB: CPT | Performed by: EMERGENCY MEDICINE

## 2025-01-29 PROCEDURE — 36415 COLL VENOUS BLD VENIPUNCTURE: CPT | Performed by: EMERGENCY MEDICINE

## 2025-01-29 PROCEDURE — 93005 ELECTROCARDIOGRAM TRACING: CPT

## 2025-01-29 PROCEDURE — 80053 COMPREHEN METABOLIC PANEL: CPT | Performed by: EMERGENCY MEDICINE

## 2025-01-29 PROCEDURE — 2500000004 HC RX 250 GENERAL PHARMACY W/ HCPCS (ALT 636 FOR OP/ED): Performed by: EMERGENCY MEDICINE

## 2025-01-29 RX ADMIN — SODIUM CHLORIDE 1000 ML: 9 INJECTION, SOLUTION INTRAVENOUS at 14:58

## 2025-01-29 ASSESSMENT — COLUMBIA-SUICIDE SEVERITY RATING SCALE - C-SSRS
1. IN THE PAST MONTH, HAVE YOU WISHED YOU WERE DEAD OR WISHED YOU COULD GO TO SLEEP AND NOT WAKE UP?: NO
2. HAVE YOU ACTUALLY HAD ANY THOUGHTS OF KILLING YOURSELF?: NO
6. HAVE YOU EVER DONE ANYTHING, STARTED TO DO ANYTHING, OR PREPARED TO DO ANYTHING TO END YOUR LIFE?: NO

## 2025-01-29 ASSESSMENT — PAIN SCALES - GENERAL: PAINLEVEL_OUTOF10: 0 - NO PAIN

## 2025-01-29 ASSESSMENT — PAIN - FUNCTIONAL ASSESSMENT: PAIN_FUNCTIONAL_ASSESSMENT: 0-10

## 2025-01-29 NOTE — ED TRIAGE NOTES
Pt to ED after recovering from pneumonia for a few weeks with continued and worsening weakness. She is very fatigued. Pt stated her breathing seems baseline, she coughs up some mucus on occasion but it is minimal. No headache, ear/sinus/throat/chest/abdominal pain, nausea, vomiting, diarrhea. Pt with decreased oral intake as her appetite has decreased. No urinary symptoms. Pt with chills. No fevers, body aches.

## 2025-01-29 NOTE — ED PROVIDER NOTES
HPI   Chief Complaint   Patient presents with    Weakness, Gen     Pt to ED after recovering from pneumonia for a few weeks with continued and worsening weakness. She is very fatigued. Pt stated her breathing seems baseline, she coughs up some mucus on occasion but it is minimal. No headache, ear/sinus/throat/chest/abdominal pain, nausea, vomiting, diarrhea. Pt with decreased oral intake as her appetite has decreased. No urinary symptoms. Pt with chills. No fevers, body aches.        79-year-old female here for chief complaint of generally not feeling well.  Patient was diagnosed with pneumonia couple weeks ago and states that she is just feels blah and weak.  It is hard to walk she gets sob. She completed all of her antibiotics. No fevers or chills, no sob, chest pain, nausea, fevers.                   Patient History   Past Medical History:   Diagnosis Date    Hypothyroidism due to medicaments and other exogenous substances 11/17/2015    Iatrogenic hypothyroidism    Long term (current) use of anticoagulants 12/14/2016    Anticoagulated on warfarin    Nonscarring hair loss, unspecified 11/23/2015    Hair loss    Other specified postprocedural states 12/14/2016    Status post circumferential ablation of pulmonary vein    Other specified postprocedural states 12/16/2016    S/P ablation of atrial fibrillation    Personal history of other diseases of the circulatory system 11/17/2015    History of atrial fibrillation    Personal history of other endocrine, nutritional and metabolic disease 11/17/2015    History of Graves' disease    Personal history of other infectious and parasitic diseases 02/16/2018    History of herpes zoster    Personal history of other specified conditions 12/07/2017    History of headache    Vascular disorder of intestine, unspecified 07/05/2017    Ischemic colitis     Past Surgical History:   Procedure Laterality Date    ABLATION OF DYSRHYTHMIC FOCUS  2015    APPENDECTOMY  11/23/2015     Appendectomy    COLONOSCOPY  08/2023    Complete Colonoscopy    REVISION TOTAL HIP ARTHROPLASTY Left      Family History   Problem Relation Name Age of Onset    Leukemia Mother      Breast cancer Mother  67    Other (cardiac disorder) Father       Social History     Tobacco Use    Smoking status: Former     Types: Cigarettes    Smokeless tobacco: Never   Vaping Use    Vaping status: Never Used   Substance Use Topics    Alcohol use: Yes     Comment: occasional    Drug use: Never       Physical Exam   ED Triage Vitals   Temperature Heart Rate Respirations BP   01/29/25 1348 01/29/25 1348 01/29/25 1348 01/29/25 1348   36.7 °C (98.1 °F) 78 18 118/68      Pulse Ox Temp Source Heart Rate Source Patient Position   01/29/25 1348 01/29/25 1348 01/29/25 1426 01/29/25 1348   (!) 92 % Temporal Monitor Sitting      BP Location FiO2 (%)     01/29/25 1348 --     Left arm        Physical Exam  Vitals and nursing note reviewed.   Constitutional:       Appearance: Normal appearance.   HENT:      Head: Normocephalic and atraumatic.      Nose: Nose normal.      Mouth/Throat:      Mouth: Mucous membranes are moist.   Eyes:      Extraocular Movements: Extraocular movements intact.      Pupils: Pupils are equal, round, and reactive to light.   Cardiovascular:      Rate and Rhythm: Normal rate and regular rhythm.   Pulmonary:      Effort: Pulmonary effort is normal.      Breath sounds: Normal breath sounds.   Abdominal:      General: Abdomen is flat.      Palpations: Abdomen is soft.   Musculoskeletal:         General: Normal range of motion.      Cervical back: Normal range of motion.   Skin:     General: Skin is warm and dry.      Capillary Refill: Capillary refill takes less than 2 seconds.   Neurological:      General: No focal deficit present.      Mental Status: She is alert.   Psychiatric:         Mood and Affect: Mood normal.           ED Course & MDM   Diagnoses as of 01/29/25 1642   Generalized weakness                 No data  recorded     [place] Coma Scale Score: 15 (01/29/25 1351 : Jody Mock RN)                           Medical Decision Making  Medical Decision Making:   [unfilled]     Madhavi Sam D.O.  Emergency Medicine          Procedure  Procedures

## 2025-01-30 LAB — HOLD SPECIMEN: NORMAL

## 2025-02-06 ENCOUNTER — OFFICE VISIT (OUTPATIENT)
Dept: CARDIOLOGY | Facility: CLINIC | Age: 80
End: 2025-02-06
Payer: MEDICARE

## 2025-02-06 VITALS
SYSTOLIC BLOOD PRESSURE: 156 MMHG | BODY MASS INDEX: 20.49 KG/M2 | DIASTOLIC BLOOD PRESSURE: 79 MMHG | HEIGHT: 64 IN | HEART RATE: 80 BPM | WEIGHT: 120 LBS | OXYGEN SATURATION: 93 %

## 2025-02-06 DIAGNOSIS — R06.02 SHORTNESS OF BREATH: Primary | ICD-10-CM

## 2025-02-06 DIAGNOSIS — E78.5 HYPERLIPIDEMIA, UNSPECIFIED HYPERLIPIDEMIA TYPE: ICD-10-CM

## 2025-02-06 PROCEDURE — 1159F MED LIST DOCD IN RCRD: CPT | Performed by: NURSE PRACTITIONER

## 2025-02-06 PROCEDURE — 99214 OFFICE O/P EST MOD 30 MIN: CPT | Performed by: NURSE PRACTITIONER

## 2025-02-06 PROCEDURE — G2211 COMPLEX E/M VISIT ADD ON: HCPCS | Performed by: NURSE PRACTITIONER

## 2025-02-06 PROCEDURE — 1160F RVW MEDS BY RX/DR IN RCRD: CPT | Performed by: NURSE PRACTITIONER

## 2025-02-06 PROCEDURE — 1036F TOBACCO NON-USER: CPT | Performed by: NURSE PRACTITIONER

## 2025-02-06 RX ORDER — ROSUVASTATIN CALCIUM 20 MG/1
20 TABLET, COATED ORAL DAILY
Qty: 90 TABLET | Refills: 3 | Status: SHIPPED | OUTPATIENT
Start: 2025-02-06 | End: 2026-02-06

## 2025-02-06 ASSESSMENT — ENCOUNTER SYMPTOMS
MUSCULOSKELETAL NEGATIVE: 1
COUGH: 1
GASTROINTESTINAL NEGATIVE: 1
NEUROLOGICAL NEGATIVE: 1
DEPRESSION: 0
CARDIOVASCULAR NEGATIVE: 1

## 2025-02-06 NOTE — PROGRESS NOTES
"Chief Complaint:   Follow-up    History Of Present Illness:    .Ms Banuelos returns in follow up.  Denies chest pain, sob, palpitations or pedal edema.  At home bp 110/60.  She will bring her cuff for calibration to next office visit.   She wishes to discuss with pcp at appointment in a few weeks, initiating losartan and or metoprolol.   Was in the hospital with pneumonia and would like to see pulmonary.  Labs per pcp.                 Last Recorded Vitals:  Blood pressure 156/79, pulse 80, height 1.626 m (5' 4\"), weight 54.4 kg (120 lb), SpO2 93%.     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 11/17/2015    Iatrogenic hypothyroidism    Long term (current) use of anticoagulants 12/14/2016    Anticoagulated on warfarin    Nonscarring hair loss, unspecified 11/23/2015    Hair loss    Other specified postprocedural states 12/14/2016    Status post circumferential ablation of pulmonary vein    Other specified postprocedural states 12/16/2016    S/P ablation of atrial fibrillation    Personal history of other diseases of the circulatory system 11/17/2015    History of atrial fibrillation    Personal history of other endocrine, nutritional and metabolic disease 11/17/2015    History of Graves' disease    Personal history of other infectious and parasitic diseases 02/16/2018    History of herpes zoster    Personal history of other specified conditions 12/07/2017    History of headache    Vascular disorder of intestine, unspecified 07/05/2017    Ischemic colitis        Past Surgical History:  Past Surgical History:   Procedure Laterality Date    ABLATION OF DYSRHYTHMIC FOCUS  2015    APPENDECTOMY  11/23/2015    Appendectomy    COLONOSCOPY  08/2023    Complete Colonoscopy    REVISION TOTAL HIP ARTHROPLASTY Left        Social History:  Social History     Socioeconomic History    Marital status:    Tobacco Use    Smoking status: Former    "  Types: Cigarettes    Smokeless tobacco: Never   Vaping Use    Vaping status: Never Used   Substance and Sexual Activity    Alcohol use: Yes     Comment: occasional    Drug use: Never    Sexual activity: Defer     Social Drivers of Health     Financial Resource Strain: Low Risk  (1/17/2025)    Overall Financial Resource Strain (CARDIA)     Difficulty of Paying Living Expenses: Not hard at all   Food Insecurity: No Food Insecurity (1/16/2025)    Hunger Vital Sign     Worried About Running Out of Food in the Last Year: Never true     Ran Out of Food in the Last Year: Never true   Transportation Needs: No Transportation Needs (1/17/2025)    PRAPARE - Transportation     Lack of Transportation (Medical): No     Lack of Transportation (Non-Medical): No   Social Connections: Feeling Socially Integrated (11/8/2023)    OASIS : Social Isolation     Frequency of experiencing loneliness or isolation: Never   Intimate Partner Violence: Not At Risk (1/16/2025)    Humiliation, Afraid, Rape, and Kick questionnaire     Fear of Current or Ex-Partner: No     Emotionally Abused: No     Physically Abused: No     Sexually Abused: No   Housing Stability: Low Risk  (1/17/2025)    Housing Stability Vital Sign     Unable to Pay for Housing in the Last Year: No     Number of Times Moved in the Last Year: 0     Homeless in the Last Year: No       Family History:  Family History   Problem Relation Name Age of Onset    Leukemia Mother      Breast cancer Mother  67    Other (cardiac disorder) Father           Allergies:  Patient has no known allergies.    Outpatient Medications:  Current Outpatient Medications   Medication Sig Dispense Refill    aspirin 81 mg EC tablet Take 1 tablet (81 mg) by mouth once daily.      benzonatate (Tessalon) 100 mg capsule Take by mouth 3 times a day as needed for cough. Do not crush or chew.      calcium carbonate-vitamin D3 500 mg-5 mcg (200 unit) tablet Take 2 tablets by mouth 2 times a day.       cholecalciferol (Vitamin D-3) 50 MCG (2000 UT) tablet Take 1 tablet (2,000 Units) by mouth once daily.      cyanocobalamin (Vitamin B-12) 500 mcg tablet Take 1 tablet (500 mcg) by mouth once daily.      flaxseed oiL 1,000 mg capsule Take 1 capsule (1,000 mg) by mouth twice a day.      fluticasone propion-salmeteroL (Advair Diskus) 250-50 mcg/dose diskus inhaler Inhale 1 puff 2 times a day. Rinse mouth with water after use to reduce aftertaste and incidence of candidiasis. Do not swallow. 180 each 0    folic acid (Folvite) 1 mg tablet Take by mouth twice a day.      glucosamine sulfate 500 mg tablet Take 1,000 mg by mouth once daily.      guaiFENesin (Mucinex) 600 mg 12 hr tablet Take 1 tablet (600 mg) by mouth early in the morning.. Do not crush, chew, or split.      levothyroxine (Synthroid, Levoxyl) 88 mcg tablet Take 1 tablet (88 mcg) by mouth once daily. 90 tablet 0    multivitamin tablet Take by mouth.      omeprazole (PriLOSEC) 20 mg DR capsule TAKE ONE CAPSULE BY MOUTH TWICE A DAY BEFORE MEALS (Patient taking differently: Take 1 capsule (20 mg) by mouth 2 times a day. Patient takes once a day, but prescribed twice a day) 180 capsule 0    polyethylene glycol (Glycolax, Miralax) 17 gram/dose powder Mix 17 g of powder and drink once daily. Takes 1 TBSP daily      rosuvastatin (Crestor) 20 mg tablet Take 1 tablet (20 mg) by mouth once daily. 90 tablet 1    turmeric-turmeric root extract 450-50 mg capsule Take by mouth.      vit C/E/Zn/coppr/lutein/zeaxan (PRESERVISION AREDS-2 ORAL) Take 1 capsule by mouth 2 times a day. Indications: macular edema associated with non-infectious uveitis       No current facility-administered medications for this visit.        Physical Exam:  Cardiovascular:      PMI at left midclavicular line. Normal rate. Regular rhythm. Normal S1. Normal S2.       Murmurs: There is no murmur.      No gallop.  No click. No rub.   Pulses:     Intact distal pulses.   Edema:     Peripheral edema  "absent.         ROS:  Review of Systems   Constitutional: Positive for malaise/fatigue.   Cardiovascular: Negative.    Respiratory:  Positive for cough.    Skin: Negative.    Musculoskeletal: Negative.    Gastrointestinal: Negative.    Genitourinary: Negative.    Neurological: Negative.           Last Labs:  CBC -  Lab Results   Component Value Date    WBC 14.5 (H) 01/29/2025    HGB 14.1 01/29/2025    HCT 42.9 01/29/2025    MCV 95 01/29/2025     01/29/2025       CMP -  Lab Results   Component Value Date    CALCIUM 9.5 01/29/2025    PHOS 4.3 11/15/2018    PROT 8.0 01/29/2025    ALBUMIN 3.9 01/29/2025    AST 24 01/29/2025    ALT 35 01/29/2025    ALKPHOS 64 01/29/2025    BILITOT 0.5 01/29/2025       LIPID PANEL -   Lab Results   Component Value Date    CHOL 156 04/12/2024    TRIG 117 04/12/2024    HDL 72.6 04/12/2024    CHHDL 2.1 04/12/2024    LDLF 72 04/17/2023    VLDL 23 04/12/2024    NHDL 83 04/12/2024       RENAL FUNCTION PANEL -   Lab Results   Component Value Date    GLUCOSE 95 01/29/2025     01/29/2025    K 4.0 01/29/2025     01/29/2025    CO2 25 01/29/2025    ANIONGAP 13 01/29/2025    BUN 19 01/29/2025    CREATININE 0.80 01/29/2025    CALCIUM 9.5 01/29/2025    PHOS 4.3 11/15/2018    ALBUMIN 3.9 01/29/2025        No results found for: \"BNP\", \"HGBA1C\"      Assessment/Plan   Problem List Items Addressed This Visit    None    Afib with hx of RFA in 2016.  Ecg done 10/2023 showed NSR.   2.  CAD.  Coronary artery calcium score of 246.53 when done 07/2022.  She had a lexiscan done 02/2023 that was negative for ischemia.   Echo 12/2022  CONCLUSIONS:   1. Left ventricular systolic function is normal with a 60-65% estimated ejection fraction.   2. Mild mitral valve regurgitation.   3. Mild tricuspid regurgitation visualized.   4. RVSP within normal limits.   5. The estimated PASP is 27 mmHg.  3.  Hyperlipidemia.    4.  Hypothyroidism.          KAMLESH Melo-CNP  "

## 2025-02-09 LAB
ATRIAL RATE: 74 BPM
P AXIS: 47 DEGREES
P OFFSET: 199 MS
P ONSET: 137 MS
PR INTERVAL: 174 MS
Q ONSET: 224 MS
QRS COUNT: 12 BEATS
QRS DURATION: 72 MS
QT INTERVAL: 398 MS
QTC CALCULATION(BAZETT): 441 MS
QTC FREDERICIA: 427 MS
R AXIS: 22 DEGREES
T AXIS: 59 DEGREES
T OFFSET: 423 MS
VENTRICULAR RATE: 74 BPM

## 2025-02-10 ENCOUNTER — PATIENT OUTREACH (OUTPATIENT)
Dept: CARE COORDINATION | Facility: CLINIC | Age: 80
End: 2025-02-10
Payer: MEDICARE

## 2025-02-10 ENCOUNTER — TELEPHONE (OUTPATIENT)
Dept: CARDIOLOGY | Facility: CLINIC | Age: 80
End: 2025-02-10
Payer: MEDICARE

## 2025-02-10 DIAGNOSIS — E78.5 HYPERLIPIDEMIA, UNSPECIFIED HYPERLIPIDEMIA TYPE: ICD-10-CM

## 2025-02-10 RX ORDER — ROSUVASTATIN CALCIUM 20 MG/1
20 TABLET, COATED ORAL DAILY
Qty: 90 TABLET | Refills: 3 | Status: SHIPPED | OUTPATIENT
Start: 2025-02-10 | End: 2025-02-11 | Stop reason: SDUPTHER

## 2025-02-10 NOTE — PROGRESS NOTES
Outreach call to patient following up on appointment with primary care provider.  Discussed appointment, reviewed medications, and discussed plan of care.  Patient with no additional questions at this time.  Will continue to follow.      JACEK LopezN, RN   [phone]   [facility] Department

## 2025-02-11 DIAGNOSIS — E78.5 HYPERLIPIDEMIA, UNSPECIFIED HYPERLIPIDEMIA TYPE: ICD-10-CM

## 2025-02-11 RX ORDER — ROSUVASTATIN CALCIUM 20 MG/1
20 TABLET, COATED ORAL DAILY
Qty: 90 TABLET | Refills: 3 | Status: SHIPPED | OUTPATIENT
Start: 2025-02-11 | End: 2026-02-11

## 2025-02-17 ENCOUNTER — APPOINTMENT (OUTPATIENT)
Dept: PRIMARY CARE | Facility: CLINIC | Age: 80
End: 2025-02-17
Payer: MEDICARE

## 2025-02-17 ENCOUNTER — HOSPITAL ENCOUNTER (OUTPATIENT)
Dept: RADIOLOGY | Facility: HOSPITAL | Age: 80
Discharge: HOME | End: 2025-02-17
Payer: MEDICARE

## 2025-02-17 VITALS
DIASTOLIC BLOOD PRESSURE: 74 MMHG | SYSTOLIC BLOOD PRESSURE: 168 MMHG | TEMPERATURE: 97.4 F | BODY MASS INDEX: 21.18 KG/M2 | WEIGHT: 123.4 LBS | HEART RATE: 71 BPM | OXYGEN SATURATION: 94 %

## 2025-02-17 DIAGNOSIS — K21.9 GASTROESOPHAGEAL REFLUX DISEASE WITHOUT ESOPHAGITIS: ICD-10-CM

## 2025-02-17 DIAGNOSIS — J18.9 PNEUMONIA OF BOTH LUNGS DUE TO INFECTIOUS ORGANISM, UNSPECIFIED PART OF LUNG: ICD-10-CM

## 2025-02-17 DIAGNOSIS — J18.9 PNEUMONIA OF BOTH LUNGS DUE TO INFECTIOUS ORGANISM, UNSPECIFIED PART OF LUNG: Primary | ICD-10-CM

## 2025-02-17 DIAGNOSIS — E78.2 MIXED HYPERLIPIDEMIA: ICD-10-CM

## 2025-02-17 DIAGNOSIS — E89.0 HYPOTHYROIDISM, POSTABLATIVE: ICD-10-CM

## 2025-02-17 DIAGNOSIS — J44.9 CHRONIC OBSTRUCTIVE PULMONARY DISEASE, UNSPECIFIED COPD TYPE (MULTI): ICD-10-CM

## 2025-02-17 PROCEDURE — 1036F TOBACCO NON-USER: CPT | Performed by: INTERNAL MEDICINE

## 2025-02-17 PROCEDURE — 71046 X-RAY EXAM CHEST 2 VIEWS: CPT | Performed by: RADIOLOGY

## 2025-02-17 PROCEDURE — 71046 X-RAY EXAM CHEST 2 VIEWS: CPT

## 2025-02-17 PROCEDURE — G2211 COMPLEX E/M VISIT ADD ON: HCPCS | Performed by: INTERNAL MEDICINE

## 2025-02-17 PROCEDURE — 1159F MED LIST DOCD IN RCRD: CPT | Performed by: INTERNAL MEDICINE

## 2025-02-17 PROCEDURE — 1160F RVW MEDS BY RX/DR IN RCRD: CPT | Performed by: INTERNAL MEDICINE

## 2025-02-17 PROCEDURE — 99214 OFFICE O/P EST MOD 30 MIN: CPT | Performed by: INTERNAL MEDICINE

## 2025-02-17 RX ORDER — LEVOTHYROXINE SODIUM 88 UG/1
88 TABLET ORAL DAILY
Qty: 90 TABLET | Refills: 0 | Status: SHIPPED | OUTPATIENT
Start: 2025-02-17 | End: 2025-05-18

## 2025-02-17 ASSESSMENT — ENCOUNTER SYMPTOMS: DIZZINESS: 1

## 2025-02-17 NOTE — PROGRESS NOTES
Subjective   Patient ID: Laura Banuelos is a 79 y.o. female who presents for Hypothyroidism, GERD, and Dizziness (Lightheaded and fatigue since having pneumonia 4 weeks ago).  GERD    Dizziness      Post hospital / ED follow up    Rec's rev'd    Pneumonia  Clinically resolved  Check chest x ray     COPD / bronchiectasis  Dyspnea at times on exertion  on albuterol prn  PFT's ordered     H/o ischemic colitis     Grave's dse   s/p radioactive iodine  On synthroid      Hypercholesterolemia on rx no side effects  H/o A Fib s/p ablation  Cardio following     H/o hip surgery  Prophylactic antibiotic if dentist requests per pt     GERD stable on rx no side effects  Poor appetite  Supplements discussed     Diet / exercise rev'd    Review of Systems   Neurological:  Positive for dizziness.   All other systems reviewed and are negative.      Objective   /74   Pulse 71   Temp 36.3 °C (97.4 °F)   Wt 56 kg (123 lb 6.4 oz)   LMP  (LMP Unknown)   SpO2 94%   BMI 21.18 kg/m²   Lab Results   Component Value Date    WBC 14.5 (H) 01/29/2025    HGB 14.1 01/29/2025    HCT 42.9 01/29/2025     01/29/2025    CHOL 156 04/12/2024    TRIG 117 04/12/2024    HDL 72.6 04/12/2024    ALT 35 01/29/2025    AST 24 01/29/2025     01/29/2025    K 4.0 01/29/2025     01/29/2025    CREATININE 0.80 01/29/2025    BUN 19 01/29/2025    CO2 25 01/29/2025    TSH 2.99 04/12/2024           Physical Exam  Vitals reviewed.   Constitutional:       Appearance: Normal appearance. She is normal weight.   HENT:      Head: Normocephalic and atraumatic.      Mouth/Throat:      Pharynx: No posterior oropharyngeal erythema.   Eyes:      General: No scleral icterus.     Conjunctiva/sclera: Conjunctivae normal.      Pupils: Pupils are equal, round, and reactive to light.   Cardiovascular:      Rate and Rhythm: Normal rate and regular rhythm.      Heart sounds: Normal heart sounds.   Pulmonary:      Effort: No respiratory distress.      Breath  sounds: No wheezing.   Abdominal:      General: Abdomen is flat. Bowel sounds are normal. There is no distension.      Palpations: Abdomen is soft. There is no mass.      Tenderness: There is no abdominal tenderness. There is no rebound.   Musculoskeletal:         General: Normal range of motion.      Cervical back: Normal range of motion and neck supple.   Skin:     General: Skin is warm and dry.   Neurological:      General: No focal deficit present.      Mental Status: She is alert and oriented to person, place, and time. Mental status is at baseline.   Psychiatric:         Mood and Affect: Mood normal.         Behavior: Behavior normal.         Thought Content: Thought content normal.         Judgment: Judgment normal.         Problem List Items Addressed This Visit             ICD-10-CM    Hyperlipidemia E78.5    Hypothyroidism, postablative E89.0    Relevant Medications    levothyroxine (Synthroid, Levoxyl) 88 mcg tablet     Other Visit Diagnoses         Codes    Pneumonia of both lungs due to infectious organism, unspecified part of lung    -  Primary J18.9    Relevant Orders    XR chest 2 views    Chronic obstructive pulmonary disease, unspecified COPD type (Multi)     J44.9    Gastroesophageal reflux disease without esophagitis     K21.9    BMI 21.0-21.9, adult     Z68.21          Assessment/Plan       Post hospital / ED follow up    Rec's rev'd    Pneumonia  Clinically resolved  Check chest x ray     COPD / bronchiectasis  Dyspnea at times on exertion  on albuterol prn  PFT's ordered     H/o ischemic colitis     Grave's dse   s/p radioactive iodine  On synthroid      Hypercholesterolemia on rx no side effects  H/o A Fib s/p ablation  Cardio following     H/o hip surgery  Prophylactic antibiotic if dentist requests per pt     GERD stable on rx no side effects  Poor appetite  Supplements discussed     Diet / exercise rev'd    Mammogram 5-24  Dexa 5-24  Colonoscopy 8-23  polyps  recheck 26  CT chest lung  cancer screening n/a  GYN n/a  immunizations rev'd flu  BMI 21.1    Follow up 3 months

## 2025-02-19 ENCOUNTER — APPOINTMENT (OUTPATIENT)
Dept: PRIMARY CARE | Facility: CLINIC | Age: 80
End: 2025-02-19
Payer: MEDICARE

## 2025-02-19 DIAGNOSIS — J44.9 CHRONIC OBSTRUCTIVE PULMONARY DISEASE, UNSPECIFIED COPD TYPE (MULTI): ICD-10-CM

## 2025-02-19 DIAGNOSIS — R93.89 ABNORMAL CHEST X-RAY: ICD-10-CM

## 2025-02-19 DIAGNOSIS — J18.9 PNEUMONIA OF BOTH LUNGS DUE TO INFECTIOUS ORGANISM, UNSPECIFIED PART OF LUNG: ICD-10-CM

## 2025-02-19 NOTE — RESULT ENCOUNTER NOTE
Please call the patient regarding her abnormal result.  Needs CT chest / pls order  Pulm consult asap / pls order

## 2025-02-26 ENCOUNTER — HOSPITAL ENCOUNTER (OUTPATIENT)
Dept: RADIOLOGY | Facility: HOSPITAL | Age: 80
Discharge: HOME | End: 2025-02-26
Payer: MEDICARE

## 2025-02-26 DIAGNOSIS — R93.89 ABNORMAL CHEST X-RAY: ICD-10-CM

## 2025-02-26 DIAGNOSIS — J18.9 PNEUMONIA OF BOTH LUNGS DUE TO INFECTIOUS ORGANISM, UNSPECIFIED PART OF LUNG: ICD-10-CM

## 2025-02-26 PROCEDURE — 71250 CT THORAX DX C-: CPT

## 2025-02-27 DIAGNOSIS — N13.30 HYDRONEPHROSIS, UNSPECIFIED HYDRONEPHROSIS TYPE: ICD-10-CM

## 2025-02-27 DIAGNOSIS — R06.02 SHORTNESS OF BREATH: ICD-10-CM

## 2025-02-27 DIAGNOSIS — J81.0 ACUTE PULMONARY EDEMA: ICD-10-CM

## 2025-02-27 DIAGNOSIS — R93.89 ABNORMAL CT OF THE CHEST: Primary | ICD-10-CM

## 2025-02-28 NOTE — RESULT ENCOUNTER NOTE
Please call the patient regarding her abnormal result.  Needs lab work and kidney ultrasound / have been ordered  Need to move up pulmonary appt asa instead of April Fusiform Excision Additional Text (Leave Blank If You Do Not Want): The margin was drawn around the clinically apparent lesion.  A fusiform shape was then drawn on the skin incorporating the lesion and margins.  Incisions were then made along these lines to the appropriate tissue plane and the lesion was extirpated.

## 2025-03-03 ENCOUNTER — TELEPHONE (OUTPATIENT)
Dept: PRIMARY CARE | Facility: CLINIC | Age: 80
End: 2025-03-03
Payer: MEDICARE

## 2025-03-03 NOTE — TELEPHONE ENCOUNTER
Spoke to patient, she has appointment for US in [place] this week, will get the bloodwork done that day also.    The first opening for pulmonology was 4/3 unless she wanted to travel to [facility].  That appointment was a little sooner but she did not want to travel so far.

## 2025-03-05 ENCOUNTER — APPOINTMENT (OUTPATIENT)
Dept: RADIOLOGY | Facility: HOSPITAL | Age: 80
End: 2025-03-05
Payer: MEDICARE

## 2025-03-05 ENCOUNTER — PATIENT OUTREACH (OUTPATIENT)
Dept: CARE COORDINATION | Facility: CLINIC | Age: 80
End: 2025-03-05
Payer: MEDICARE

## 2025-03-05 ENCOUNTER — HOSPITAL ENCOUNTER (OUTPATIENT)
Dept: RADIOLOGY | Facility: CLINIC | Age: 80
Discharge: HOME | End: 2025-03-05
Payer: MEDICARE

## 2025-03-05 DIAGNOSIS — N13.30 HYDRONEPHROSIS, UNSPECIFIED HYDRONEPHROSIS TYPE: ICD-10-CM

## 2025-03-05 PROCEDURE — 76770 US EXAM ABDO BACK WALL COMP: CPT

## 2025-03-05 PROCEDURE — 76770 US EXAM ABDO BACK WALL COMP: CPT | Performed by: STUDENT IN AN ORGANIZED HEALTH CARE EDUCATION/TRAINING PROGRAM

## 2025-03-05 NOTE — PROGRESS NOTES
Outreach call to patient to check in 30 days after hospital discharge to support smooth transition of care.  Patient with no additional needs noted. No additional outreach needed at this time.  Will dis enroll from [facility] program.     JACEK LopezN, RN   [phone]   [facility] Department

## 2025-03-06 LAB — BNP SERPL-MCNC: 110 PG/ML

## 2025-03-13 ENCOUNTER — APPOINTMENT (OUTPATIENT)
Dept: PULMONOLOGY | Facility: CLINIC | Age: 80
End: 2025-03-13
Payer: MEDICARE

## 2025-03-13 VITALS
HEART RATE: 72 BPM | WEIGHT: 124.2 LBS | SYSTOLIC BLOOD PRESSURE: 169 MMHG | BODY MASS INDEX: 21.32 KG/M2 | DIASTOLIC BLOOD PRESSURE: 77 MMHG | OXYGEN SATURATION: 94 %

## 2025-03-13 DIAGNOSIS — R93.89 ABNORMAL CHEST X-RAY: ICD-10-CM

## 2025-03-13 DIAGNOSIS — J18.9 PNEUMONIA OF BOTH LUNGS DUE TO INFECTIOUS ORGANISM, UNSPECIFIED PART OF LUNG: ICD-10-CM

## 2025-03-13 DIAGNOSIS — R06.02 SHORTNESS OF BREATH: ICD-10-CM

## 2025-03-13 PROCEDURE — 99215 OFFICE O/P EST HI 40 MIN: CPT | Performed by: PEDIATRICS

## 2025-03-13 PROCEDURE — 1159F MED LIST DOCD IN RCRD: CPT | Performed by: PEDIATRICS

## 2025-03-13 PROCEDURE — 1160F RVW MEDS BY RX/DR IN RCRD: CPT | Performed by: PEDIATRICS

## 2025-03-13 PROCEDURE — 1036F TOBACCO NON-USER: CPT | Performed by: PEDIATRICS

## 2025-03-13 NOTE — LETTER
March 13, 2025     Helena French MD  [address] [address]    Patient: Laura Banuelos   YOB: 1945   Date of Visit: 3/13/2025       Dear Dr. Helena French MD:    Thank you for referring Laura Banuelos to me for evaluation. Below are my notes for this consultation.  If you have questions, please do not hesitate to call me. I look forward to following your patient along with you.       Sincerely,     Lisandro Ferguson MD      CC: No Recipients  ______________________________________________________________________________________    Subjective  Patient ID: Laura Banuelos is a 79 y.o. female who presents for ?pneumonia, COPD/emphysema, lung nodules      HPI    Ms Banuelos is a 79yr old tht mariama pneumonia in January, this was treated and she has had serial CXR and CT that show slow resolution.  It took her a long time to get over the pneumonia but now she is feeling well.  She was started on advair and feels that has helped a lot.  The last CT she had shows some patchy infiltrates but these appear to be less prominent when compared to prior CXR.  Interestingly the radiologist did not mention the rather extensive emphysematous changes that are present.  She did a PFT in September that show mild obstruction, otherwise normal.    She is very active and feels she is getting back to be able to do this again.      She is a former smoker from age [age]      Review of Systems    See scanned documents attached to this note for review of systems, and appropriate scales/scores for this visit.    Objective  Physical Exam  Constitutional:       Appearance: Normal appearance.   HENT:      Head: Normocephalic and atraumatic.      Mouth/Throat:      Pharynx: Oropharynx is clear.   Cardiovascular:      Rate and Rhythm: Normal rate and regular rhythm.      Pulses: Normal pulses.      Heart sounds: Normal heart sounds.   Pulmonary:      Effort: Pulmonary effort is normal.      Breath sounds: Normal  breath sounds. No wheezing, rhonchi or rales.   Abdominal:      General: Bowel sounds are normal.      Palpations: Abdomen is soft.   Musculoskeletal:         General: Normal range of motion.   Skin:     General: Skin is warm and dry.   Neurological:      General: No focal deficit present.      Mental Status: She is alert and oriented to person, place, and time.   Psychiatric:         Mood and Affect: Mood normal.       Assessment/Plan    79yr old with COPD/emphysema and recent pneumonia that has taken a slow recovery time    COPD/emphysema:  feels back to baseline now except a residual dry cough   - continue advair    2. Pneumonia:  resolving radiologically.(It can take weeks to months for resolution)  - will get repeat CT in 3 months       Follow up 3 months after CT    Lisandro Ferguson MD 03/13/25 11:43 AM

## 2025-03-13 NOTE — PROGRESS NOTES
Subjective   Patient ID: Laura Banuelos is a 79 y.o. female who presents for ?pneumonia, COPD/emphysema, lung nodules      HPI    Ms Banuelos is a 79yr old tht mariama pneumonia in January, this was treated and she has had serial CXR and CT that show slow resolution.  It took her a long time to get over the pneumonia but now she is feeling well.  She was started on advair and feels that has helped a lot.  The last CT she had shows some patchy infiltrates but these appear to be less prominent when compared to prior CXR.  Interestingly the radiologist did not mention the rather extensive emphysematous changes that are present.  She did a PFT in September that show mild obstruction, otherwise normal.    She is very active and feels she is getting back to be able to do this again.      She is a former smoker from age [age]      Review of Systems    See scanned documents attached to this note for review of systems, and appropriate scales/scores for this visit.    Objective   Physical Exam  Constitutional:       Appearance: Normal appearance.   HENT:      Head: Normocephalic and atraumatic.      Mouth/Throat:      Pharynx: Oropharynx is clear.   Cardiovascular:      Rate and Rhythm: Normal rate and regular rhythm.      Pulses: Normal pulses.      Heart sounds: Normal heart sounds.   Pulmonary:      Effort: Pulmonary effort is normal.      Breath sounds: Normal breath sounds. No wheezing, rhonchi or rales.   Abdominal:      General: Bowel sounds are normal.      Palpations: Abdomen is soft.   Musculoskeletal:         General: Normal range of motion.   Skin:     General: Skin is warm and dry.   Neurological:      General: No focal deficit present.      Mental Status: She is alert and oriented to person, place, and time.   Psychiatric:         Mood and Affect: Mood normal.       Assessment/Plan     79yr old with COPD/emphysema and recent pneumonia that has taken a slow recovery time    COPD/emphysema:  feels back to baseline now  except a residual dry cough   - continue advair    2. Pneumonia:  resolving radiologically.(It can take weeks to months for resolution)  - will get repeat CT in 3 months       Follow up 3 months after CT    Lisandro Ferguson MD 03/13/25 11:43 AM

## 2025-03-26 ENCOUNTER — APPOINTMENT (OUTPATIENT)
Dept: OPHTHALMOLOGY | Facility: CLINIC | Age: 80
End: 2025-03-26
Payer: MEDICARE

## 2025-03-27 DIAGNOSIS — J44.9 CHRONIC OBSTRUCTIVE PULMONARY DISEASE, UNSPECIFIED COPD TYPE (MULTI): ICD-10-CM

## 2025-03-27 RX ORDER — FLUTICASONE PROPIONATE AND SALMETEROL 250; 50 UG/1; UG/1
1 POWDER RESPIRATORY (INHALATION)
Qty: 180 EACH | Refills: 0 | Status: SHIPPED | OUTPATIENT
Start: 2025-03-27

## 2025-04-01 ENCOUNTER — HOSPITAL ENCOUNTER (OUTPATIENT)
Dept: RADIOLOGY | Facility: HOSPITAL | Age: 80
Discharge: HOME | End: 2025-04-01
Payer: MEDICARE

## 2025-04-01 ENCOUNTER — APPOINTMENT (OUTPATIENT)
Dept: ORTHOPEDIC SURGERY | Facility: CLINIC | Age: 80
End: 2025-04-01
Payer: MEDICARE

## 2025-04-01 DIAGNOSIS — M25.561 PAIN IN BOTH KNEES, UNSPECIFIED CHRONICITY: ICD-10-CM

## 2025-04-01 DIAGNOSIS — M17.12 ARTHRITIS OF LEFT KNEE: ICD-10-CM

## 2025-04-01 DIAGNOSIS — M25.562 PAIN IN BOTH KNEES, UNSPECIFIED CHRONICITY: ICD-10-CM

## 2025-04-01 DIAGNOSIS — M17.11 ARTHRITIS OF RIGHT KNEE: Primary | ICD-10-CM

## 2025-04-01 PROCEDURE — 1036F TOBACCO NON-USER: CPT

## 2025-04-01 PROCEDURE — 73564 X-RAY EXAM KNEE 4 OR MORE: CPT | Mod: 50

## 2025-04-01 PROCEDURE — 20610 DRAIN/INJ JOINT/BURSA W/O US: CPT

## 2025-04-01 PROCEDURE — 1159F MED LIST DOCD IN RCRD: CPT

## 2025-04-01 PROCEDURE — 1160F RVW MEDS BY RX/DR IN RCRD: CPT

## 2025-04-01 PROCEDURE — 99214 OFFICE O/P EST MOD 30 MIN: CPT

## 2025-04-01 RX ORDER — TRIAMCINOLONE ACETONIDE 40 MG/ML
2.5 INJECTION, SUSPENSION INTRA-ARTICULAR; INTRAMUSCULAR
Status: COMPLETED | OUTPATIENT
Start: 2025-04-01 | End: 2025-04-01

## 2025-04-01 RX ADMIN — TRIAMCINOLONE ACETONIDE 2.5 MG: 40 INJECTION, SUSPENSION INTRA-ARTICULAR; INTRAMUSCULAR at 14:25

## 2025-04-01 ASSESSMENT — PAIN - FUNCTIONAL ASSESSMENT: PAIN_FUNCTIONAL_ASSESSMENT: NO/DENIES PAIN

## 2025-04-01 NOTE — PROGRESS NOTES
This is a consultation from Dr. Helena French MD for   Chief Complaint   Patient presents with    Left Knee - Pain    Right Knee - Pain       This is a 79 y.o. female who presents for evaluation of bilateral knees that has been hurting for the past few weeks to months.  Patient states that she has been receiving cortisone injections about once a year usually around this time of the year.  She states that she likes to get an injection before she goes out and gardens very often during the warmer weather.  She has been happy with this treatment option and would like to do another steroid injection today.  Patient states that her right knee is worse than her left but is beneficial for both his they both bother her when it gets bad.  Patient denies numbness tingling his distal extremities.  Patient denies fevers chills.    Physical Exam    There has been no interval change in this patient's past medical, surgical, medications, allergies, family history or social history since the most recent visit to a provider within our department. 14 point review of systems was performed, reviewed, and negative except for pertinent positives documented in the history of present illness.     Constitutional: well developed, well nourished female in no acute distress  Psychiatric: normal mood, appropriate affect  Eyes: sclera anicteric  HENT: normocephalic/atraumatic  CV: regular rate and rhythm   Respiratory: non labored breathing  Integumentary: no rash  Neurological: moves all extremities    Bilateral knee exam: skin intact no lacerations or abrations. no effusion. nttp joint line. negative log roll negative patellar grind. ROM 0-120. stable to varus and valgus stress at 0 and 30 degrees. negative lachman negative posterior drawer negative gregorio. 5/5 ehl/fhl/gs/ta. silt s/s/sp/dp/t. 2+ dp/pt        Imaging:  Xrays were ordered by me, they were reviewed and independently interpreted by me today, they show right knee with  moderate to severe joint space degeneration especially in the lateral compartment.  Left knee shows moderate joint space degeneratio.  No presence of acute fracture or dislocation.    L Inj/Asp: bilateral knee on 4/1/2025 2:25 PM  Indications: pain and joint swelling  Details: 22 G needle, anterolateral approach  Medications (Right): 2.5 mg triamcinolone acetonide 40 mg/mL  Medications (Left): 2.5 mg triamcinolone acetonide 40 mg/mL    Discussion:  I discussed the conservative treatment options for knee osteoarthritis including but not limited to physical therapy, oral NSAIDS, activity and lifestyle modification, and corticosteroid injections. Pt has elected to undergo a cortisone injection today. I have explained the risk and benefits of an injection including the possibility of joint infection, bleeding, damage to cartilage, allergic reaction. Patient verbalized understanding and gave verbal consent wishes to proceed with a intra-articular cortisone injection for their knee.    Procedure:  After discussing the risk and benefits of the procedure, we proceeded with an intra-articular bilateral knee injection. We discussed the risks and benefits and potential morbidity related to the treatment, and to the prescription medication administered in the injection    With the patient's informed verbal consent, the bilateral knees were prepped in standard sterile fashion with Chlorhexidine. The skin was then anesthetized with ethyl chloride spray and cleaned again with Chlorhexidine. The bilateral knees were then apirated/injected with a prefilled 20-gauge syringe of 40 mg Kenalog + 4 ml Lidocaine using the lateral approach without complications.  The patient tolerated this well and felt immediate initial relief of symptoms. A bandaid was applied and the patient ambulated out of the clinic on ther own accord without difficulty. Patient was instructed to avoid physical activity for 24-48 hours to prevent the knees from  swelling and may ice the knees as tolerated. Patient should contact the office if any signs of of infection appear: redness, fever, chills, drainage, swelling or warmth to the knees.  Pt understands that the injections can be repeated no sooner than 3 months.      Procedure, treatment alternatives, risks and benefits explained, specific risks discussed. Consent was given by the patient. Immediately prior to procedure a time out was called to verify the correct patient, procedure, equipment, support staff and site/side marked as required. Patient was prepped and draped in the usual sterile fashion.             Impression/Plan: This is a 79 y.o. female with exacerbations of pain to bilateral knees.  I had an in depth discussion with the patient regarding treatment options for arthritis and their relative risks and benefits. We reviewed surgical and nonsurgical option for treatment. Treatments include anti inflammatory medications, physical therapy, weight loss, activity modification, use of assistive devices, injection therapies. We discussed current prescriptions and risks and benefits of continuation of prescription medication as apporpriate. We discussed that arthritis is often progressive over time, an in end stage arthritis surgical interventions can be considered, including arthroplasty. All questions were answered and the patient voiced their understanding.  We did bilateral steroid injections today and she is welcome back in 3 months or more for another round.    BMI Readings from Last 1 Encounters:   03/13/25 21.32 kg/m²      Lab Results   Component Value Date    CREATININE 0.80 01/29/2025     Tobacco Use: Medium Risk (4/1/2025)    Patient History     Smoking Tobacco Use: Former     Smokeless Tobacco Use: Never     Passive Exposure: Not on file      Computed MELD 3.0 unavailable. One or more values for this score either were not found within the given timeframe or did not fit some other criterion.  Computed  "MELD-Na unavailable. One or more values for this score either were not found within the given timeframe or did not fit some other criterion.       No results found for: \"HGBA1C\"  Lab Results   Component Value Date    STAPHMRSASCR No Staphylococcus aureus isolated 10/03/2023   Evaluation of bilateral knee pain.  Patient states that both of her knees have been bothering her for the past few weeks to months.  He states that she gets 1 cortisone injection a year especially around this time when the weather starts to get nice.  She is able to go out and garden.    "

## 2025-04-03 ENCOUNTER — APPOINTMENT (OUTPATIENT)
Dept: PULMONOLOGY | Facility: CLINIC | Age: 80
End: 2025-04-03
Payer: MEDICARE

## 2025-05-08 ENCOUNTER — APPOINTMENT (OUTPATIENT)
Dept: CARDIOLOGY | Facility: CLINIC | Age: 80
End: 2025-05-08
Payer: MEDICARE

## 2025-05-19 ENCOUNTER — APPOINTMENT (OUTPATIENT)
Dept: PRIMARY CARE | Facility: CLINIC | Age: 80
End: 2025-05-19
Payer: MEDICARE

## 2025-05-19 VITALS
TEMPERATURE: 97.4 F | SYSTOLIC BLOOD PRESSURE: 148 MMHG | BODY MASS INDEX: 20.94 KG/M2 | WEIGHT: 122 LBS | DIASTOLIC BLOOD PRESSURE: 66 MMHG | HEART RATE: 71 BPM | OXYGEN SATURATION: 97 %

## 2025-05-19 DIAGNOSIS — Z86.39 H/O GRAVES' DISEASE: ICD-10-CM

## 2025-05-19 DIAGNOSIS — K21.9 GASTROESOPHAGEAL REFLUX DISEASE WITHOUT ESOPHAGITIS: ICD-10-CM

## 2025-05-19 DIAGNOSIS — Z12.31 ENCOUNTER FOR SCREENING MAMMOGRAM FOR MALIGNANT NEOPLASM OF BREAST: ICD-10-CM

## 2025-05-19 DIAGNOSIS — E78.00 HYPERCHOLESTEREMIA: ICD-10-CM

## 2025-05-19 DIAGNOSIS — R93.89 ABNORMAL CT OF THE CHEST: ICD-10-CM

## 2025-05-19 DIAGNOSIS — J44.9 CHRONIC OBSTRUCTIVE PULMONARY DISEASE, UNSPECIFIED COPD TYPE (MULTI): ICD-10-CM

## 2025-05-19 DIAGNOSIS — E55.9 VITAMIN D INSUFFICIENCY: ICD-10-CM

## 2025-05-19 DIAGNOSIS — Z00.00 ANNUAL PHYSICAL EXAM: Primary | ICD-10-CM

## 2025-05-19 PROBLEM — M31.6 TEMPORAL ARTERITIS (MULTI): Status: RESOLVED | Noted: 2023-05-01 | Resolved: 2025-05-19

## 2025-05-19 PROCEDURE — 1159F MED LIST DOCD IN RCRD: CPT | Performed by: INTERNAL MEDICINE

## 2025-05-19 PROCEDURE — G2211 COMPLEX E/M VISIT ADD ON: HCPCS | Performed by: INTERNAL MEDICINE

## 2025-05-19 PROCEDURE — 99214 OFFICE O/P EST MOD 30 MIN: CPT | Performed by: INTERNAL MEDICINE

## 2025-05-19 PROCEDURE — 1160F RVW MEDS BY RX/DR IN RCRD: CPT | Performed by: INTERNAL MEDICINE

## 2025-05-19 PROCEDURE — 1036F TOBACCO NON-USER: CPT | Performed by: INTERNAL MEDICINE

## 2025-05-19 PROCEDURE — 99397 PER PM REEVAL EST PAT 65+ YR: CPT | Performed by: INTERNAL MEDICINE

## 2025-05-27 ENCOUNTER — HOSPITAL ENCOUNTER (OUTPATIENT)
Dept: RADIOLOGY | Facility: HOSPITAL | Age: 80
Discharge: HOME | End: 2025-05-27
Payer: MEDICARE

## 2025-05-27 DIAGNOSIS — R93.89 ABNORMAL CHEST X-RAY: ICD-10-CM

## 2025-05-27 DIAGNOSIS — J18.9 PNEUMONIA OF BOTH LUNGS DUE TO INFECTIOUS ORGANISM, UNSPECIFIED PART OF LUNG: ICD-10-CM

## 2025-05-27 PROCEDURE — 71250 CT THORAX DX C-: CPT

## 2025-05-27 PROCEDURE — 71250 CT THORAX DX C-: CPT | Performed by: RADIOLOGY

## 2025-06-05 ENCOUNTER — OFFICE VISIT (OUTPATIENT)
Dept: CARDIOLOGY | Facility: CLINIC | Age: 80
End: 2025-06-05
Payer: MEDICARE

## 2025-06-05 VITALS
HEART RATE: 68 BPM | BODY MASS INDEX: 20.77 KG/M2 | DIASTOLIC BLOOD PRESSURE: 74 MMHG | WEIGHT: 121 LBS | OXYGEN SATURATION: 98 % | SYSTOLIC BLOOD PRESSURE: 150 MMHG

## 2025-06-05 DIAGNOSIS — I47.10 PSVT (PAROXYSMAL SUPRAVENTRICULAR TACHYCARDIA): ICD-10-CM

## 2025-06-05 DIAGNOSIS — I48.0 PAROXYSMAL ATRIAL FIBRILLATION (MULTI): ICD-10-CM

## 2025-06-05 DIAGNOSIS — E78.5 HYPERLIPIDEMIA, UNSPECIFIED HYPERLIPIDEMIA TYPE: Primary | ICD-10-CM

## 2025-06-05 PROCEDURE — 99212 OFFICE O/P EST SF 10 MIN: CPT | Performed by: INTERNAL MEDICINE

## 2025-06-05 PROCEDURE — 99214 OFFICE O/P EST MOD 30 MIN: CPT | Performed by: INTERNAL MEDICINE

## 2025-06-05 PROCEDURE — G2211 COMPLEX E/M VISIT ADD ON: HCPCS | Performed by: INTERNAL MEDICINE

## 2025-06-05 PROCEDURE — 1159F MED LIST DOCD IN RCRD: CPT | Performed by: INTERNAL MEDICINE

## 2025-06-05 PROCEDURE — 1160F RVW MEDS BY RX/DR IN RCRD: CPT | Performed by: INTERNAL MEDICINE

## 2025-06-05 ASSESSMENT — ENCOUNTER SYMPTOMS
GASTROINTESTINAL NEGATIVE: 1
COUGH: 1
NEUROLOGICAL NEGATIVE: 1
CARDIOVASCULAR NEGATIVE: 1
MUSCULOSKELETAL NEGATIVE: 1

## 2025-06-05 NOTE — PROGRESS NOTES
Chief Complaint:   Follow-up    History Of Present Illness:    .Ms Banuelos returns in follow up.  Denies chest pain, sob, palpitations or pedal edema.  At home bp 110/60.  She will bring her cuff for calibration to next office visit.   She wishes to discuss with pcp at appointment in a few weeks, initiating losartan and or metoprolol.   Was in the hospital with pneumonia and would like to see pulmonary.  Labs per pcp.               Last Recorded Vitals:  Weight 54.9 kg (121 lb).     Past Medical History:  Past Medical History:   Diagnosis Date    Anxiety     Arthritis     Disease of thyroid gland     Hearing aid worn     Hypothyroidism due to medicaments and other exogenous substances 11/17/2015    Iatrogenic hypothyroidism    Long term (current) use of anticoagulants 12/14/2016    Anticoagulated on warfarin    Nonscarring hair loss, unspecified 11/23/2015    Hair loss    Other specified postprocedural states 12/14/2016    Status post circumferential ablation of pulmonary vein    Other specified postprocedural states 12/16/2016    S/P ablation of atrial fibrillation    Personal history of other diseases of the circulatory system 11/17/2015    History of atrial fibrillation    Personal history of other endocrine, nutritional and metabolic disease 11/17/2015    History of Graves' disease    Personal history of other infectious and parasitic diseases 02/16/2018    History of herpes zoster    Personal history of other specified conditions 12/07/2017    History of headache    Vascular disorder of intestine, unspecified 07/05/2017    Ischemic colitis        Past Surgical History:  Past Surgical History:   Procedure Laterality Date    ABLATION OF DYSRHYTHMIC FOCUS  2015    APPENDECTOMY  11/23/2015    Appendectomy    COLONOSCOPY  08/2023    Complete Colonoscopy    REVISION TOTAL HIP ARTHROPLASTY Left        Social History:  Social History     Socioeconomic History    Marital status:    Tobacco Use    Smoking status:  Former     Current packs/day: 0.00     Average packs/day: 2.0 packs/day for 50.0 years (100.0 ttl pk-yrs)     Types: Cigarettes     Start date: 1955     Quit date: 2005     Years since quittin.4    Smokeless tobacco: Never   Vaping Use    Vaping status: Never Used   Substance and Sexual Activity    Alcohol use: Yes     Alcohol/week: 2.0 standard drinks of alcohol     Types: 2 Shots of liquor per week     Comment: Only 2 shots. Could not correct the error    Drug use: Never    Sexual activity: Not Currently     Birth control/protection: None     Social Drivers of Health     Financial Resource Strain: Low Risk  (2025)    Overall Financial Resource Strain (CARDIA)     Difficulty of Paying Living Expenses: Not hard at all   Food Insecurity: No Food Insecurity (2025)    Hunger Vital Sign     Worried About Running Out of Food in the Last Year: Never true     Ran Out of Food in the Last Year: Never true   Transportation Needs: No Transportation Needs (2025)    PRAPARE - Transportation     Lack of Transportation (Medical): No     Lack of Transportation (Non-Medical): No   Social Connections: Feeling Socially Integrated (2023)    OASIS : Social Isolation     Frequency of experiencing loneliness or isolation: Never   Intimate Partner Violence: Not At Risk (2025)    Humiliation, Afraid, Rape, and Kick questionnaire     Fear of Current or Ex-Partner: No     Emotionally Abused: No     Physically Abused: No     Sexually Abused: No   Housing Stability: Low Risk  (2025)    Housing Stability Vital Sign     Unable to Pay for Housing in the Last Year: No     Number of Times Moved in the Last Year: 0     Homeless in the Last Year: No       Family History:  Family History   Problem Relation Name Age of Onset    Leukemia Mother E liu     Breast cancer Mother E liu 67    Heart disease Mother E liu     Cancer Mother E liu     Other (cardiac disorder) Father J f liu     Heart disease  Father MERYL hatfield          Allergies:  Patient has no known allergies.    Outpatient Medications:  Current Outpatient Medications   Medication Sig Dispense Refill    aspirin 81 mg EC tablet Take 1 tablet (81 mg) by mouth once daily.      calcium carbonate-vitamin D3 500 mg-5 mcg (200 unit) tablet Take 2 tablets by mouth 2 times a day.      cholecalciferol (Vitamin D-3) 50 MCG (2000 UT) tablet Take 1 tablet (50 mcg) by mouth once daily.      cyanocobalamin (Vitamin B-12) 500 mcg tablet Take 1 tablet (500 mcg) by mouth once daily.      flaxseed oiL 1,000 mg capsule Take 1 capsule (1,000 mg) by mouth twice a day.      fluticasone propion-salmeteroL (Advair Diskus) 250-50 mcg/dose diskus inhaler Inhale 1 puff 2 times a day. Rinse mouth with water after use to reduce aftertaste and incidence of candidiasis. Do not swallow. 180 each 0    folic acid (Folvite) 1 mg tablet Take by mouth twice a day.      glucosamine sulfate 500 mg tablet Take 1,000 mg by mouth once daily.      guaiFENesin (Mucinex) 600 mg 12 hr tablet Take 1 tablet (600 mg) by mouth early in the morning.. Do not crush, chew, or split.      multivitamin tablet Take by mouth.      omeprazole (PriLOSEC) 20 mg DR capsule TAKE ONE CAPSULE BY MOUTH TWICE A DAY BEFORE MEALS (Patient taking differently: Take 1 capsule (20 mg) by mouth 1 time.) 180 capsule 0    polyethylene glycol (Glycolax, Miralax) 17 gram/dose powder Mix 17 g of powder and drink once daily. Takes 1 TBSP daily      rosuvastatin (Crestor) 20 mg tablet Take 1 tablet (20 mg) by mouth once daily. 90 tablet 3    turmeric-turmeric root extract 450-50 mg capsule Take by mouth.      vit C/E/Zn/coppr/lutein/zeaxan (PRESERVISION AREDS-2 ORAL) Take 1 capsule by mouth 2 times a day. Indications: macular edema associated with non-infectious uveitis      levothyroxine (Synthroid, Levoxyl) 88 mcg tablet Take 1 tablet (88 mcg) by mouth once daily. 90 tablet 0     No current facility-administered medications for  this visit.        Physical Exam:  Cardiovascular:      PMI at left midclavicular line. Normal rate. Regular rhythm. Normal S1. Normal S2.       Murmurs: There is no murmur.      No gallop.  No click. No rub.   Pulses:     Intact distal pulses.   Edema:     Peripheral edema absent.       ROS:  Review of Systems   Constitutional: Positive for malaise/fatigue.   Cardiovascular: Negative.    Respiratory:  Positive for cough.    Skin: Negative.    Musculoskeletal: Negative.    Gastrointestinal: Negative.    Genitourinary: Negative.    Neurological: Negative.           Last Labs:  CBC -  Lab Results   Component Value Date    WBC 14.5 (H) 01/29/2025    HGB 14.1 01/29/2025    HCT 42.9 01/29/2025    MCV 95 01/29/2025     01/29/2025       CMP -  Lab Results   Component Value Date    CALCIUM 9.5 01/29/2025    PHOS 4.3 11/15/2018    PROT 8.0 01/29/2025    ALBUMIN 3.9 01/29/2025    AST 24 01/29/2025    ALT 35 01/29/2025    ALKPHOS 64 01/29/2025    BILITOT 0.5 01/29/2025       LIPID PANEL -   Lab Results   Component Value Date    CHOL 156 04/12/2024    TRIG 117 04/12/2024    HDL 72.6 04/12/2024    CHHDL 2.1 04/12/2024    LDLF 72 04/17/2023    VLDL 23 04/12/2024    NHDL 83 04/12/2024       RENAL FUNCTION PANEL -   Lab Results   Component Value Date    GLUCOSE 95 01/29/2025     01/29/2025    K 4.0 01/29/2025     01/29/2025    CO2 25 01/29/2025    ANIONGAP 13 01/29/2025    BUN 19 01/29/2025    CREATININE 0.80 01/29/2025    CALCIUM 9.5 01/29/2025    PHOS 4.3 11/15/2018    ALBUMIN 3.9 01/29/2025        Lab Results   Component Value Date     (H) 03/05/2025         Assessment/Plan   Problem List Items Addressed This Visit    None    Paroxysmal atrial fibrillation with hx of RFA in 2016.  Ecg done 10/2023 showed NSR.  Clinically the patient does remain in sinus rhythm.  Clinically she denies any palpitations.  2.  CAD.  Coronary artery calcium score of 246.53 when done 07/2022.  Echocardiogram performed on  12/21/2022 demonstrated a preserved LV ejection fraction of 60-65% with mild 1+ mitral and tricuspid valve regurgitation and normal estimated PA systolic pressure.  She had a lexiscan pharmacological nuclear stress test done 02/16/2023 that was negative for ischemia.   3.  Hyperlipidemia.  Lipid panel 4/12/2024 includes cholesterol 156 LDL 60 HDL 72 triglyceride 117.  Continue the rosuvastatin 20 mg daily.  4.  Hypothyroidism.  5.?  Hypertension.  Patient's home blood pressure readings are excellent ranging between /52-58 with heart rates of 75 to 80/min.  Patient claims to have whitecoat syndrome.  Blood pressure office visit 6/5/2025 150/74.  Will defer treatment for now but patient will bring in her home blood pressure readings and cuff next visit for correlation.  6.  COPD.  Patient is being followed by pulmonology and clinically doing better on Advair.  7.  Status post right sided cataract extraction 6/2/2025.

## 2025-06-09 ENCOUNTER — TELEMEDICINE (OUTPATIENT)
Dept: PRIMARY CARE | Facility: CLINIC | Age: 80
End: 2025-06-09
Payer: MEDICARE

## 2025-06-09 DIAGNOSIS — J44.9 CHRONIC OBSTRUCTIVE PULMONARY DISEASE, UNSPECIFIED COPD TYPE (MULTI): ICD-10-CM

## 2025-06-09 DIAGNOSIS — J20.9 ACUTE BRONCHITIS, UNSPECIFIED ORGANISM: Primary | ICD-10-CM

## 2025-06-09 DIAGNOSIS — R93.89 ABNORMAL CT OF THE CHEST: ICD-10-CM

## 2025-06-09 PROCEDURE — 99214 OFFICE O/P EST MOD 30 MIN: CPT | Performed by: INTERNAL MEDICINE

## 2025-06-09 PROCEDURE — 1036F TOBACCO NON-USER: CPT | Performed by: INTERNAL MEDICINE

## 2025-06-09 PROCEDURE — 1160F RVW MEDS BY RX/DR IN RCRD: CPT | Performed by: INTERNAL MEDICINE

## 2025-06-09 PROCEDURE — 1159F MED LIST DOCD IN RCRD: CPT | Performed by: INTERNAL MEDICINE

## 2025-06-09 PROCEDURE — G2211 COMPLEX E/M VISIT ADD ON: HCPCS | Performed by: INTERNAL MEDICINE

## 2025-06-09 RX ORDER — AMOXICILLIN AND CLAVULANATE POTASSIUM 875; 125 MG/1; MG/1
875 TABLET, FILM COATED ORAL 2 TIMES DAILY
Qty: 20 TABLET | Refills: 0 | Status: SHIPPED | OUTPATIENT
Start: 2025-06-09 | End: 2025-06-19

## 2025-06-09 NOTE — PROGRESS NOTES
Subjective   Patient ID: Laura Banuelos is a 80 y.o. female who presents for sick visit    Virtual or Telephone Consent    An interactive audio and video telecommunication system which permits real time communications between the patient (at the originating site) and provider (at the distant site) was utilized to provide this telehealth service.   Verbal consent was requested and obtained from Laura Banuelos on this date, 06/09/25 for a telehealth visit and the patient's location was confirmed at the time of the visit.   [facility]     Tele health    Same day sick    Cough discolored x 1 week  Mostly in am but color darkening  No fever or tiredness  Feels ok  Going for walks without difficulty  Acute bronchitis  Augmentin 875 mg bid #20 no refills  Risk / benefits rev'd  Rest increase fluids  Follow up pulm    H/o Pneumonia  Clinically resolved  Pulm following  chest CT pending 6-25     COPD / bronchiectasis  Dyspnea at times on exertion better  on albuterol prn  PFT's 9-24  Pulm following     H/o ischemic colitis     Grave's dse   s/p radioactive iodine  On synthroid      Hypercholesterolemia on rx no side effects  H/o A Fib s/p ablation  Cardio following     Arthritis  Tylenol dosing reviewed     H/o hip surgery  Prophylactic antibiotic if dentist requests per pt     GERD stable on rx no side effects  Supplements discussed     Diet / exercise rev'd     Check mammogram and labs  Follow up CT chest     Review of Systems   All other systems reviewed and are negative.      Objective   LMP  (LMP Unknown)     Physical Exam  Constitutional:       Appearance: Normal appearance. She is normal weight.   Pulmonary:      Effort: Pulmonary effort is normal.   Neurological:      Mental Status: She is alert.   Psychiatric:         Mood and Affect: Mood normal.         Assessment/Plan   Problem List Items Addressed This Visit    None  Visit Diagnoses         Codes      Acute bronchitis, unspecified organism    -  Primary J20.9    Relevant  Medications    amoxicillin-clavulanate (Augmentin) 875-125 mg tablet      Abnormal CT of the chest     R93.89      Chronic obstructive pulmonary disease, unspecified COPD type (Multi)     J44.9      BMI 20.0-20.9, adult     Z68.20          [facility]    Same day sick    Cough discolored x 1 week  Mostly in am but color darkening  No fever or tiredness  Feels ok  Going for walks without difficulty  Acute bronchitis  Augmentin 875 mg bid #20 no refills  Risk / benefits rev'd  Rest increase fluids  Follow up pulm    H/o Pneumonia  Clinically resolved  Pulm following  chest CT pending 6-25     COPD / bronchiectasis  Dyspnea at times on exertion better  on albuterol prn  PFT's 9-24  Pulm following     H/o ischemic colitis     Grave's dse   s/p radioactive iodine  On synthroid      Hypercholesterolemia on rx no side effects  H/o A Fib s/p ablation  Cardio following     Arthritis  Tylenol dosing reviewed     H/o hip surgery  Prophylactic antibiotic if dentist requests per pt     GERD stable on rx no side effects  Supplements discussed     Diet / exercise rev'd     Check mammogram and labs  Follow up CT chest    Follow up as scheduled

## 2025-06-12 ENCOUNTER — APPOINTMENT (OUTPATIENT)
Dept: RADIOLOGY | Facility: CLINIC | Age: 80
End: 2025-06-12
Payer: MEDICARE

## 2025-06-12 VITALS — BODY MASS INDEX: 20.14 KG/M2 | HEIGHT: 64 IN | WEIGHT: 118 LBS

## 2025-06-12 DIAGNOSIS — Z12.31 ENCOUNTER FOR SCREENING MAMMOGRAM FOR MALIGNANT NEOPLASM OF BREAST: ICD-10-CM

## 2025-06-12 PROCEDURE — 77067 SCR MAMMO BI INCL CAD: CPT | Performed by: RADIOLOGY

## 2025-06-12 PROCEDURE — 77067 SCR MAMMO BI INCL CAD: CPT

## 2025-06-12 PROCEDURE — 77063 BREAST TOMOSYNTHESIS BI: CPT | Performed by: RADIOLOGY

## 2025-06-16 DIAGNOSIS — E89.0 HYPOTHYROIDISM, POSTABLATIVE: ICD-10-CM

## 2025-06-16 RX ORDER — LEVOTHYROXINE SODIUM 88 UG/1
88 TABLET ORAL DAILY
Qty: 90 TABLET | Refills: 0 | Status: SHIPPED | OUTPATIENT
Start: 2025-06-16 | End: 2025-09-14

## 2025-06-23 ENCOUNTER — APPOINTMENT (OUTPATIENT)
Dept: PULMONOLOGY | Facility: CLINIC | Age: 80
End: 2025-06-23
Payer: MEDICARE

## 2025-06-23 VITALS
WEIGHT: 122.4 LBS | BODY MASS INDEX: 21.01 KG/M2 | DIASTOLIC BLOOD PRESSURE: 76 MMHG | HEART RATE: 72 BPM | SYSTOLIC BLOOD PRESSURE: 149 MMHG | OXYGEN SATURATION: 97 %

## 2025-06-23 DIAGNOSIS — R91.1 LUNG NODULE: Primary | ICD-10-CM

## 2025-06-23 DIAGNOSIS — J40 BRONCHITIS: ICD-10-CM

## 2025-06-23 PROCEDURE — 1159F MED LIST DOCD IN RCRD: CPT | Performed by: PEDIATRICS

## 2025-06-23 PROCEDURE — 1160F RVW MEDS BY RX/DR IN RCRD: CPT | Performed by: PEDIATRICS

## 2025-06-23 PROCEDURE — 1036F TOBACCO NON-USER: CPT | Performed by: PEDIATRICS

## 2025-06-23 PROCEDURE — 99215 OFFICE O/P EST HI 40 MIN: CPT | Performed by: PEDIATRICS

## 2025-06-23 NOTE — PROGRESS NOTES
Subjective   Patient ID: Laura Banuelos is a 80 y.o. female who presents for COPD/emphysema, lung nodule      HPI    6/23/2025:  Ms Banuelos is doing well.  She had a CT 5/27/2025 that shows some new nodularity in the RML, she then developed a cough with some yellow phlegm, she was given prednisone and antibiotics by her PCP.      Initial visit 3/13/2025:  Ms Banuelos is a 79yr old tht mariama pneumonia in January, this was treated and she has had serial CXR and CT that show slow resolution.  It took her a long time to get over the pneumonia but now she is feeling well.  She was started on advair and feels that has helped a lot.  The last CT she had shows some patchy infiltrates but these appear to be less prominent when compared to prior CXR.  Interestingly the radiologist did not mention the rather extensive emphysematous changes that are present.  She did a PFT in September that show mild obstruction, otherwise normal.     She is very active and feels she is getting back to be able to do this again.       She is a former smoker from age [age]      Review of Systems    See scanned documents attached to this note for review of systems, and appropriate scales/scores for this visit.     Objective   Physical Exam  Constitutional:       Appearance: Normal appearance.   HENT:      Head: Normocephalic and atraumatic.      Mouth/Throat:      Pharynx: Oropharynx is clear.   Cardiovascular:      Rate and Rhythm: Normal rate and regular rhythm.      Pulses: Normal pulses.      Heart sounds: Normal heart sounds.   Pulmonary:      Effort: Pulmonary effort is normal.      Breath sounds: Normal breath sounds. No wheezing, rhonchi or rales.   Abdominal:      General: Bowel sounds are normal.      Palpations: Abdomen is soft.   Musculoskeletal:         General: Normal range of motion.   Skin:     General: Skin is warm and dry.   Neurological:      General: No focal deficit present.      Mental Status: She is alert and oriented to person,  place, and time.   Psychiatric:         Mood and Affect: Mood normal.       Assessment/Plan     80yr old with COPD/emphysema and recent pneumonia that has taken a slow recovery time     COPD/emphysema:  feels back to baseline now except a residual dry cough   - continue advair  6/23/2025:  doing well continue advair     2. Pneumonia:  resolving radiologically.(It can take weeks to months for resolution)  - will get repeat CT in 3 months   6/23/2025: previous pneumonia resolved but new area in RML, had bronchitis at that time which has been treated.  Will get repeat CT in 3 months        Follow up 3 months after CT       Lisandro Ferguson MD 06/23/25 10:18 AM

## 2025-06-30 DIAGNOSIS — J44.9 CHRONIC OBSTRUCTIVE PULMONARY DISEASE, UNSPECIFIED COPD TYPE (MULTI): ICD-10-CM

## 2025-07-02 RX ORDER — FLUTICASONE PROPIONATE AND SALMETEROL 250; 50 UG/1; UG/1
1 POWDER RESPIRATORY (INHALATION)
Qty: 60 EACH | Refills: 0 | Status: SHIPPED | OUTPATIENT
Start: 2025-07-02

## 2025-07-28 DIAGNOSIS — J44.9 CHRONIC OBSTRUCTIVE PULMONARY DISEASE, UNSPECIFIED COPD TYPE (MULTI): ICD-10-CM

## 2025-07-28 RX ORDER — FLUTICASONE PROPIONATE AND SALMETEROL 250; 50 UG/1; UG/1
1 POWDER RESPIRATORY (INHALATION)
Qty: 30 EACH | Refills: 0 | Status: SHIPPED | OUTPATIENT
Start: 2025-07-28

## 2025-08-08 LAB
25(OH)D3+25(OH)D2 SERPL-MCNC: 72 NG/ML (ref 30–100)
ALBUMIN SERPL-MCNC: 4.7 G/DL (ref 3.6–5.1)
ALP SERPL-CCNC: 55 U/L (ref 37–153)
ALT SERPL-CCNC: 40 U/L (ref 6–29)
ANION GAP SERPL CALCULATED.4IONS-SCNC: 11 MMOL/L (CALC) (ref 7–17)
AST SERPL-CCNC: 50 U/L (ref 10–35)
BASOPHILS # BLD AUTO: 32 CELLS/UL (ref 0–200)
BASOPHILS NFR BLD AUTO: 0.5 %
BILIRUB SERPL-MCNC: 0.6 MG/DL (ref 0.2–1.2)
BUN SERPL-MCNC: 20 MG/DL (ref 7–25)
CALCIUM SERPL-MCNC: 10.7 MG/DL (ref 8.6–10.4)
CHLORIDE SERPL-SCNC: 104 MMOL/L (ref 98–110)
CHOLEST SERPL-MCNC: 168 MG/DL
CHOLEST/HDLC SERPL: 1.6 (CALC)
CO2 SERPL-SCNC: 26 MMOL/L (ref 20–32)
CREAT SERPL-MCNC: 0.87 MG/DL (ref 0.6–0.95)
EGFRCR SERPLBLD CKD-EPI 2021: 67 ML/MIN/1.73M2
EOSINOPHIL # BLD AUTO: 189 CELLS/UL (ref 15–500)
EOSINOPHIL NFR BLD AUTO: 3 %
ERYTHROCYTE [DISTWIDTH] IN BLOOD BY AUTOMATED COUNT: 12.2 % (ref 11–15)
GLUCOSE SERPL-MCNC: 88 MG/DL (ref 65–99)
HCT VFR BLD AUTO: 44.9 % (ref 35–45)
HDLC SERPL-MCNC: 105 MG/DL
HGB BLD-MCNC: 14.8 G/DL (ref 11.7–15.5)
LDLC SERPL CALC-MCNC: 49 MG/DL (CALC)
LYMPHOCYTES # BLD AUTO: 2293 CELLS/UL (ref 850–3900)
LYMPHOCYTES NFR BLD AUTO: 36.4 %
MCH RBC QN AUTO: 33.1 PG (ref 27–33)
MCHC RBC AUTO-ENTMCNC: 33 G/DL (ref 32–36)
MCV RBC AUTO: 100.4 FL (ref 80–100)
MONOCYTES # BLD AUTO: 523 CELLS/UL (ref 200–950)
MONOCYTES NFR BLD AUTO: 8.3 %
NEUTROPHILS # BLD AUTO: 3263 CELLS/UL (ref 1500–7800)
NEUTROPHILS NFR BLD AUTO: 51.8 %
NONHDLC SERPL-MCNC: 63 MG/DL (CALC)
PLATELET # BLD AUTO: 183 THOUSAND/UL (ref 140–400)
PMV BLD REES-ECKER: 10.4 FL (ref 7.5–12.5)
POTASSIUM SERPL-SCNC: 4.4 MMOL/L (ref 3.5–5.3)
PROT SERPL-MCNC: 7.3 G/DL (ref 6.1–8.1)
RBC # BLD AUTO: 4.47 MILLION/UL (ref 3.8–5.1)
SODIUM SERPL-SCNC: 141 MMOL/L (ref 135–146)
TRIGL SERPL-MCNC: 65 MG/DL
TSH SERPL-ACNC: 1.57 MIU/L (ref 0.4–4.5)
WBC # BLD AUTO: 6.3 THOUSAND/UL (ref 3.8–10.8)

## 2025-08-19 ENCOUNTER — APPOINTMENT (OUTPATIENT)
Dept: PRIMARY CARE | Facility: CLINIC | Age: 80
End: 2025-08-19
Payer: MEDICARE

## 2025-08-19 VITALS
BODY MASS INDEX: 21.01 KG/M2 | OXYGEN SATURATION: 96 % | WEIGHT: 122.4 LBS | HEART RATE: 71 BPM | DIASTOLIC BLOOD PRESSURE: 60 MMHG | SYSTOLIC BLOOD PRESSURE: 140 MMHG | TEMPERATURE: 96.5 F

## 2025-08-19 DIAGNOSIS — E53.8 VITAMIN B12 DEFICIENCY: ICD-10-CM

## 2025-08-19 DIAGNOSIS — Z71.2 ENCOUNTER TO DISCUSS TEST RESULTS: Primary | ICD-10-CM

## 2025-08-19 DIAGNOSIS — K21.9 GASTROESOPHAGEAL REFLUX DISEASE WITHOUT ESOPHAGITIS: ICD-10-CM

## 2025-08-19 DIAGNOSIS — Z86.39 H/O GRAVES' DISEASE: ICD-10-CM

## 2025-08-19 DIAGNOSIS — R79.89 ABNORMAL LFTS: ICD-10-CM

## 2025-08-19 DIAGNOSIS — E89.0 HYPOTHYROIDISM, POSTABLATIVE: ICD-10-CM

## 2025-08-19 DIAGNOSIS — E55.9 VITAMIN D INSUFFICIENCY: ICD-10-CM

## 2025-08-19 DIAGNOSIS — E78.5 HYPERLIPIDEMIA, UNSPECIFIED HYPERLIPIDEMIA TYPE: ICD-10-CM

## 2025-08-19 DIAGNOSIS — E83.52 HYPERCALCEMIA: ICD-10-CM

## 2025-08-19 DIAGNOSIS — J44.9 CHRONIC OBSTRUCTIVE PULMONARY DISEASE, UNSPECIFIED COPD TYPE (MULTI): ICD-10-CM

## 2025-08-19 PROCEDURE — 1159F MED LIST DOCD IN RCRD: CPT | Performed by: INTERNAL MEDICINE

## 2025-08-19 PROCEDURE — 99214 OFFICE O/P EST MOD 30 MIN: CPT | Performed by: INTERNAL MEDICINE

## 2025-08-19 PROCEDURE — 1160F RVW MEDS BY RX/DR IN RCRD: CPT | Performed by: INTERNAL MEDICINE

## 2025-08-19 PROCEDURE — G2211 COMPLEX E/M VISIT ADD ON: HCPCS | Performed by: INTERNAL MEDICINE

## 2025-08-19 RX ORDER — LEVOTHYROXINE SODIUM 88 UG/1
88 TABLET ORAL DAILY
Qty: 90 TABLET | Refills: 0 | Status: SHIPPED | OUTPATIENT
Start: 2025-08-19 | End: 2025-11-17

## 2025-08-19 RX ORDER — FLUTICASONE PROPIONATE AND SALMETEROL 250; 50 UG/1; UG/1
1 POWDER RESPIRATORY (INHALATION)
Qty: 180 EACH | Refills: 0 | Status: SHIPPED | OUTPATIENT
Start: 2025-08-19 | End: 2025-11-17

## 2025-08-19 RX ORDER — OMEPRAZOLE 20 MG/1
CAPSULE, DELAYED RELEASE ORAL
Qty: 180 CAPSULE | Refills: 0 | Status: SHIPPED | OUTPATIENT
Start: 2025-08-19

## 2025-08-19 RX ORDER — FLUTICASONE PROPIONATE AND SALMETEROL 250; 50 UG/1; UG/1
1 POWDER RESPIRATORY (INHALATION)
Qty: 30 EACH | Refills: 0 | Status: SHIPPED | OUTPATIENT
Start: 2025-08-19 | End: 2025-08-19 | Stop reason: SDUPTHER

## 2025-09-02 ENCOUNTER — HOSPITAL ENCOUNTER (OUTPATIENT)
Dept: RADIOLOGY | Facility: HOSPITAL | Age: 80
Discharge: HOME | End: 2025-09-02
Payer: MEDICARE

## 2025-09-02 DIAGNOSIS — R91.1 LUNG NODULE: ICD-10-CM

## 2025-09-02 PROCEDURE — 71250 CT THORAX DX C-: CPT | Performed by: RADIOLOGY

## 2025-09-02 PROCEDURE — 71250 CT THORAX DX C-: CPT

## 2025-09-11 ENCOUNTER — APPOINTMENT (OUTPATIENT)
Dept: PULMONOLOGY | Facility: CLINIC | Age: 80
End: 2025-09-11
Payer: MEDICARE

## 2025-09-12 ENCOUNTER — APPOINTMENT (OUTPATIENT)
Dept: ORTHOPEDIC SURGERY | Facility: CLINIC | Age: 80
End: 2025-09-12
Payer: MEDICARE

## 2025-09-29 ENCOUNTER — APPOINTMENT (OUTPATIENT)
Dept: PRIMARY CARE | Facility: CLINIC | Age: 80
End: 2025-09-29
Payer: MEDICARE

## (undated) DEVICE — SYRINGE, 35 CC, LUER LOCK, MONOJECT, W/O CAP, LF

## (undated) DEVICE — SUTURE, V-LOC, 3-0, 23IN, P-12, 90 ABS

## (undated) DEVICE — SKIN CLOSURE SYS, PREMIERPRO EXOFIN, 1-4CM X 22CM, 1.75G TUBE

## (undated) DEVICE — SYRINGE, 30 CC, LUER LOCK

## (undated) DEVICE — SUTURE, VICRYL, 1, 24 IN, CTD, UNDYED

## (undated) DEVICE — TIP, SUCTION, YANKAUER, FLEXIBLE

## (undated) DEVICE — IRRIGATION SYSTEM, WOUND, PULSAVAC PLUS

## (undated) DEVICE — BLADE, OSCILLATOR 19.5 X 86 X 1.27MM

## (undated) DEVICE — HOOD, SURGICAL, FLYTE, T7 PLUS, PEEL AWAY SHIELD

## (undated) DEVICE — DRAPE PACK, TOTAL HIP, CUSTOM, GEAUGA

## (undated) DEVICE — SEALER, BIPOLAR, AQUA MANTYS 6.0

## (undated) DEVICE — KIT, MINOR, DOUBLE BASIN

## (undated) DEVICE — DRESSING, MEPILEX BORDER, POST-OP AG, 4 X 10 IN

## (undated) DEVICE — NEEDLE, SPINAL, QUINCKE, 18 G X 3.5 IN, PINK HUB

## (undated) DEVICE — SUTURE, QUILL, BARBED, PDO, 2, 24 X 24CM, T8 36MM TAPER POINT, 1/2 CIRCLE

## (undated) DEVICE — DRAPE, ISOLATION, ANTIMICROBIAL, IOBAN, W/FILM & POUCH, LARGE, 32 X 70 CM

## (undated) DEVICE — APPLICATOR, CHLORAPREP, W/ORANGE TINT, 26ML

## (undated) DEVICE — CATHETER TRAY, SURESTEP, 14FR, PRECONNECTED DRAIN BAG

## (undated) DEVICE — COVER, TABLE, 44 X 75 IN, DISPOSABLE, LF, STERILE

## (undated) DEVICE — HOOD, SURGICAL, FLYTE HYBRID

## (undated) DEVICE — GLOVE, SURGICAL, PROTEXIS PI ORTHO, 8.0, PF, LF

## (undated) DEVICE — STAPLER, PROXIMATE SKIN, REGULAR 35, STERILE

## (undated) DEVICE — SUTURE, CTD, VICRYL, 2-0, UND, BR, CT-2